# Patient Record
Sex: FEMALE | Race: WHITE | NOT HISPANIC OR LATINO | Employment: OTHER | URBAN - METROPOLITAN AREA
[De-identification: names, ages, dates, MRNs, and addresses within clinical notes are randomized per-mention and may not be internally consistent; named-entity substitution may affect disease eponyms.]

---

## 2017-12-07 PROBLEM — R53.1 GENERALIZED WEAKNESS: Status: ACTIVE | Noted: 2017-12-07

## 2017-12-08 PROBLEM — E11.9 DIABETES: Status: ACTIVE | Noted: 2017-12-08

## 2017-12-08 PROBLEM — R78.81 BACTEREMIA: Status: ACTIVE | Noted: 2017-12-08

## 2017-12-08 PROBLEM — I10 HTN (HYPERTENSION): Status: ACTIVE | Noted: 2017-12-08

## 2017-12-09 PROBLEM — N39.0 UTI (URINARY TRACT INFECTION): Status: ACTIVE | Noted: 2017-12-09

## 2017-12-10 PROBLEM — E87.6 HYPOKALEMIA: Status: ACTIVE | Noted: 2017-12-10

## 2017-12-11 PROBLEM — E87.6 HYPOKALEMIA: Status: RESOLVED | Noted: 2017-12-10 | Resolved: 2017-12-11

## 2018-01-02 PROBLEM — R52 PAIN: Status: ACTIVE | Noted: 2018-01-02

## 2018-01-02 PROBLEM — K80.00 CHOLELITHIASIS WITH ACUTE CHOLECYSTITIS: Status: ACTIVE | Noted: 2018-01-02

## 2018-01-04 ENCOUNTER — TELEPHONE (OUTPATIENT)
Dept: GASTROENTEROLOGY | Facility: CLINIC | Age: 78
End: 2018-01-04

## 2018-01-04 ENCOUNTER — TELEPHONE (OUTPATIENT)
Dept: ENDOSCOPY | Facility: HOSPITAL | Age: 78
End: 2018-01-04

## 2018-01-04 DIAGNOSIS — K86.89 PANCREATIC MASS: Primary | ICD-10-CM

## 2018-01-04 NOTE — TELEPHONE ENCOUNTER
Spoke with patient, daughter and  about instructions for UEUS scheduled 1/8/18 at 1100.  Instructions emailed.

## 2018-01-04 NOTE — TELEPHONE ENCOUNTER
Spoke with patient and daughter. EUS scheduled for 1/8 at 11a. Reviewed prep instructions. Ms Aiken verbalized understanding.

## 2018-01-06 ENCOUNTER — ANESTHESIA EVENT (OUTPATIENT)
Dept: ENDOSCOPY | Facility: HOSPITAL | Age: 78
End: 2018-01-06
Payer: MEDICARE

## 2018-01-08 ENCOUNTER — SURGERY (OUTPATIENT)
Age: 78
End: 2018-01-08

## 2018-01-08 ENCOUNTER — ANESTHESIA (OUTPATIENT)
Dept: ENDOSCOPY | Facility: HOSPITAL | Age: 78
End: 2018-01-08
Payer: MEDICARE

## 2018-01-08 PROCEDURE — D9220A PRA ANESTHESIA: Mod: CRNA,,, | Performed by: NURSE ANESTHETIST, CERTIFIED REGISTERED

## 2018-01-08 PROCEDURE — 25000003 PHARM REV CODE 250: Performed by: INTERNAL MEDICINE

## 2018-01-08 PROCEDURE — D9220A PRA ANESTHESIA: Mod: ANES,,, | Performed by: ANESTHESIOLOGY

## 2018-01-08 PROCEDURE — 63600175 PHARM REV CODE 636 W HCPCS: Performed by: NURSE ANESTHETIST, CERTIFIED REGISTERED

## 2018-01-08 RX ORDER — ONDANSETRON 2 MG/ML
INJECTION INTRAMUSCULAR; INTRAVENOUS
Status: DISCONTINUED | OUTPATIENT
Start: 2018-01-08 | End: 2018-01-08

## 2018-01-08 RX ORDER — PROPOFOL 10 MG/ML
VIAL (ML) INTRAVENOUS
Status: DISCONTINUED | OUTPATIENT
Start: 2018-01-08 | End: 2018-01-08

## 2018-01-08 RX ORDER — PROPOFOL 10 MG/ML
VIAL (ML) INTRAVENOUS CONTINUOUS PRN
Status: DISCONTINUED | OUTPATIENT
Start: 2018-01-08 | End: 2018-01-08

## 2018-01-08 RX ORDER — LIDOCAINE HCL/PF 100 MG/5ML
SYRINGE (ML) INTRAVENOUS
Status: DISCONTINUED | OUTPATIENT
Start: 2018-01-08 | End: 2018-01-08

## 2018-01-08 RX ORDER — FENTANYL CITRATE 50 UG/ML
INJECTION, SOLUTION INTRAMUSCULAR; INTRAVENOUS
Status: DISCONTINUED | OUTPATIENT
Start: 2018-01-08 | End: 2018-01-08

## 2018-01-08 RX ADMIN — FENTANYL CITRATE 25 MCG: 50 INJECTION, SOLUTION INTRAMUSCULAR; INTRAVENOUS at 11:01

## 2018-01-08 RX ADMIN — LIDOCAINE HYDROCHLORIDE 50 MG: 20 INJECTION, SOLUTION INTRAVENOUS at 11:01

## 2018-01-08 RX ADMIN — PROPOFOL 20 MG: 10 INJECTION, EMULSION INTRAVENOUS at 11:01

## 2018-01-08 RX ADMIN — PROPOFOL 30 MG: 10 INJECTION, EMULSION INTRAVENOUS at 11:01

## 2018-01-08 RX ADMIN — FENTANYL CITRATE 25 MCG: 50 INJECTION, SOLUTION INTRAMUSCULAR; INTRAVENOUS at 12:01

## 2018-01-08 RX ADMIN — SODIUM CHLORIDE: 0.9 INJECTION, SOLUTION INTRAVENOUS at 11:01

## 2018-01-08 RX ADMIN — PROPOFOL 100 MCG/KG/MIN: 10 INJECTION, EMULSION INTRAVENOUS at 11:01

## 2018-01-08 RX ADMIN — ONDANSETRON 4 MG: 2 INJECTION INTRAMUSCULAR; INTRAVENOUS at 11:01

## 2018-01-08 NOTE — ANESTHESIA PREPROCEDURE EVALUATION
01/08/2018  Rosamaria Aiken is a 77 y.o., female.  Past Medical History:   Diagnosis Date    Diabetes     HTN (hypertension)        Anesthesia Evaluation    I have reviewed the Patient Summary Reports.    I have reviewed the Nursing Notes.   I have reviewed the Medications.     Review of Systems  Anesthesia Hx:  No problems with previous Anesthesia  History of prior surgery of interest to airway management or planning: Previous anesthesia: General Denies Family Hx of Anesthesia complications.   Denies Personal Hx of Anesthesia complications.   Social:  Non-Smoker    Cardiovascular:   Hypertension Denies MI.  Denies CAD.           Pulmonary:  Pulmonary Normal    Renal/:  Renal/ Normal     Hepatic/GI:   GERD, well controlled Pancreatic mass   Neurological:  Neurology Normal    Endocrine:   Diabetes, type 2      Lab Results   Component Value Date    HGBA1C 6.9 (H) 01/02/2018       Physical Exam  General:  Well nourished    Airway/Jaw/Neck:  Airway Findings: Mouth Opening: Normal Tongue: Normal  General Airway Assessment: Adult  Mallampati: II  TM Distance: Normal, at least 6 cm  Jaw/Neck Findings:  Neck ROM: Extension Decreased, Mod.      Dental:  Dental Findings: In tact        Mental Status:  Mental Status Findings:  Cooperative, Alert and Oriented         Anesthesia Plan  Type of Anesthesia, risks & benefits discussed:  Anesthesia Type:  general  Patient's Preference:   Intra-op Monitoring Plan:   Intra-op Monitoring Plan Comments:   Post Op Pain Control Plan:   Post Op Pain Control Plan Comments:   Induction:   IV  Beta Blocker:  Patient is not currently on a Beta-Blocker (No further documentation required).       Informed Consent: Patient understands risks and agrees with Anesthesia plan.  Questions answered. Anesthesia consent signed with patient.  ASA Score: 2     Day of Surgery Review of History &  Physical:    H&P update referred to the surgeon.         Ready For Surgery From Anesthesia Perspective.

## 2018-01-08 NOTE — ANESTHESIA RELEASE NOTE
"Anesthesia Release from PACU Note    Patient: oRsamaria Aiken    Procedure(s) Performed: Procedure(s) (LRB):  ULTRASOUND-ENDOSCOPIC-UPPER (N/A)    Anesthesia type: general    Post pain: Adequate analgesia    Post assessment: no apparent anesthetic complications and tolerated procedure well    Last Vitals:   Visit Vitals  /65   Pulse 67   Temp 36.8 °C (98.2 °F) (Skin)   Resp 18   Ht 5' 3" (1.6 m)   Wt 58.1 kg (128 lb)   SpO2 98%   Breastfeeding? No   BMI 22.67 kg/m²       Post vital signs: stable    Level of consciousness: awake and alert     Nausea/Vomiting: no nausea/no vomiting    Complications: none    Airway Patency: patent    Respiratory: unassisted, spontaneous ventilation, room air    Cardiovascular: stable and blood pressure at baseline    Hydration: euvolemic  "

## 2018-01-08 NOTE — ANESTHESIA POSTPROCEDURE EVALUATION
"Anesthesia Post Evaluation    Patient: Rosamaria Aiken    Procedure(s) Performed: Procedure(s) (LRB):  ULTRASOUND-ENDOSCOPIC-UPPER (N/A)    Final Anesthesia Type: general  Patient location during evaluation: PACU  Patient participation: Yes- Able to Participate  Level of consciousness: awake and alert  Post-procedure vital signs: reviewed and stable  Pain management: adequate  Airway patency: patent  PONV status at discharge: No PONV  Anesthetic complications: no      Cardiovascular status: hemodynamically stable  Respiratory status: unassisted, spontaneous ventilation and room air  Hydration status: euvolemic  Follow-up not needed.        Visit Vitals  /65   Pulse 67   Temp 36.8 °C (98.2 °F) (Skin)   Resp 18   Ht 5' 3" (1.6 m)   Wt 58.1 kg (128 lb)   SpO2 98%   Breastfeeding? No   BMI 22.67 kg/m²       Pain/Rocco Score: Pain Assessment Performed: Yes (1/8/2018  1:44 PM)  Presence of Pain: denies (1/8/2018  1:44 PM)  Rocco Score: 10 (1/8/2018  1:00 PM)      "

## 2018-01-08 NOTE — TRANSFER OF CARE
"Anesthesia Transfer of Care Note    Patient: Rosamaria Aiken    Procedure(s) Performed: Procedure(s) (LRB):  ULTRASOUND-ENDOSCOPIC-UPPER (N/A)    Patient location: PACU    Anesthesia Type: general    Transport from OR: Transported from OR on 2-3 L/min O2 by NC with adequate spontaneous ventilation    Post pain: adequate analgesia    Post assessment: no apparent anesthetic complications and tolerated procedure well    Post vital signs: stable    Level of consciousness: awake    Nausea/Vomiting: no nausea/vomiting    Complications: none    Transfer of care protocol was followed      Last vitals:   Visit Vitals  /49   Pulse 66   Temp 36.6 °C (97.9 °F)   Resp 20   Ht 5' 3" (1.6 m)   Wt 58.1 kg (128 lb)   SpO2 100% on 2 L NC   Breastfeeding? No   BMI 22.67 kg/m²     "

## 2018-01-13 PROBLEM — K51.00 PANCOLITIS: Status: ACTIVE | Noted: 2018-01-13

## 2018-01-14 PROBLEM — D64.9 ANEMIA: Status: ACTIVE | Noted: 2018-01-14

## 2018-01-15 ENCOUNTER — PATIENT MESSAGE (OUTPATIENT)
Dept: GASTROENTEROLOGY | Facility: CLINIC | Age: 78
End: 2018-01-15

## 2018-01-16 ENCOUNTER — TELEPHONE (OUTPATIENT)
Dept: SURGERY | Facility: CLINIC | Age: 78
End: 2018-01-16

## 2018-01-16 NOTE — TELEPHONE ENCOUNTER
----- Message from Sakina Monzon MA sent at 1/15/2018  4:33 PM CST -----  Contact: Pt daughter Julieta:[phone]  Luana,   Patient wants to see Dr Arriaga.  Margarita  ----- Message -----  From: Mariposa Jacobs  Sent: 1/15/2018  12:17 PM  To: [facility]lo [facility]ONES Staff    Pt daughter called and states she would like to speak with the nurse in regards to the next step for her mother the pt. Pt daughter states she would like to go a head and make the appointment with the oncologist. Pt daughter Julieta states she would like to get the results for the pt from her biopsy.

## 2018-01-18 ENCOUNTER — TELEPHONE (OUTPATIENT)
Dept: SURGERY | Facility: CLINIC | Age: 78
End: 2018-01-18

## 2018-01-18 NOTE — TELEPHONE ENCOUNTER
----- Message from Dora Swann sent at 1/18/2018 10:22 AM CST -----  Contact: / Efren Schwartz called and wanted to reschedule a appointment with Joy schneider for 11am instead of 10am   And also had general questions. Please call Efren @ [phone] . Thanks :)

## 2018-01-22 ENCOUNTER — INITIAL CONSULT (OUTPATIENT)
Dept: SURGERY | Facility: CLINIC | Age: 78
End: 2018-01-22
Payer: MEDICARE

## 2018-01-22 ENCOUNTER — LAB VISIT (OUTPATIENT)
Dept: LAB | Facility: HOSPITAL | Age: 78
End: 2018-01-22
Attending: SURGERY
Payer: MEDICARE

## 2018-01-22 VITALS
DIASTOLIC BLOOD PRESSURE: 75 MMHG | BODY MASS INDEX: 22.7 KG/M2 | HEIGHT: 64 IN | SYSTOLIC BLOOD PRESSURE: 130 MMHG | WEIGHT: 132.94 LBS | HEART RATE: 77 BPM | TEMPERATURE: 97 F

## 2018-01-22 DIAGNOSIS — R53.1 WEAKNESS: ICD-10-CM

## 2018-01-22 DIAGNOSIS — C25.9 MALIGNANT NEOPLASM OF PANCREAS, UNSPECIFIED LOCATION OF MALIGNANCY: ICD-10-CM

## 2018-01-22 DIAGNOSIS — C25.9 MALIGNANT NEOPLASM OF PANCREAS, UNSPECIFIED LOCATION OF MALIGNANCY: Primary | ICD-10-CM

## 2018-01-22 DIAGNOSIS — E55.9 VITAMIN D DEFICIENCY: ICD-10-CM

## 2018-01-22 DIAGNOSIS — R53.1 GENERALIZED WEAKNESS: Primary | ICD-10-CM

## 2018-01-22 DIAGNOSIS — E56.9 VITAMIN DEFICIENCY: ICD-10-CM

## 2018-01-22 LAB
25(OH)D3+25(OH)D2 SERPL-MCNC: 22 NG/ML
ALBUMIN SERPL BCP-MCNC: 3 G/DL
ALP SERPL-CCNC: 57 U/L
ALT SERPL W/O P-5'-P-CCNC: 8 U/L
ANION GAP SERPL CALC-SCNC: 10 MMOL/L
AST SERPL-CCNC: 15 U/L
BASOPHILS # BLD AUTO: 0.03 K/UL
BASOPHILS NFR BLD: 0.3 %
BILIRUB SERPL-MCNC: 0.3 MG/DL
BUN SERPL-MCNC: 21 MG/DL
CALCIUM SERPL-MCNC: 9.4 MG/DL
CHLORIDE SERPL-SCNC: 109 MMOL/L
CO2 SERPL-SCNC: 23 MMOL/L
CREAT SERPL-MCNC: 1.2 MG/DL
DIFFERENTIAL METHOD: ABNORMAL
EOSINOPHIL # BLD AUTO: 0.9 K/UL
EOSINOPHIL NFR BLD: 9.4 %
ERYTHROCYTE [DISTWIDTH] IN BLOOD BY AUTOMATED COUNT: 14.1 %
EST. GFR  (AFRICAN AMERICAN): 50.4 ML/MIN/1.73 M^2
EST. GFR  (NON AFRICAN AMERICAN): 43.7 ML/MIN/1.73 M^2
GLUCOSE SERPL-MCNC: 233 MG/DL
HCT VFR BLD AUTO: 32.3 %
HGB BLD-MCNC: 10.9 G/DL
IMM GRANULOCYTES # BLD AUTO: 0.15 K/UL
IMM GRANULOCYTES NFR BLD AUTO: 1.6 %
LYMPHOCYTES # BLD AUTO: 1.7 K/UL
LYMPHOCYTES NFR BLD: 17.6 %
MCH RBC QN AUTO: 31 PG
MCHC RBC AUTO-ENTMCNC: 33.7 G/DL
MCV RBC AUTO: 92 FL
MONOCYTES # BLD AUTO: 0.7 K/UL
MONOCYTES NFR BLD: 7.8 %
NEUTROPHILS # BLD AUTO: 5.9 K/UL
NEUTROPHILS NFR BLD: 63.3 %
NRBC BLD-RTO: 0 /100 WBC
PLATELET # BLD AUTO: 368 K/UL
PMV BLD AUTO: 10 FL
POTASSIUM SERPL-SCNC: 3.3 MMOL/L
PREALB SERPL-MCNC: 21 MG/DL
PROT SERPL-MCNC: 6.5 G/DL
RBC # BLD AUTO: 3.52 M/UL
SODIUM SERPL-SCNC: 142 MMOL/L
WBC # BLD AUTO: 9.35 K/UL

## 2018-01-22 PROCEDURE — 99204 OFFICE O/P NEW MOD 45 MIN: CPT | Mod: S$GLB,,, | Performed by: SURGERY

## 2018-01-22 PROCEDURE — 99999 PR PBB SHADOW E&M-EST. PATIENT-LVL III: CPT | Mod: PBBFAC,,, | Performed by: SURGERY

## 2018-01-22 PROCEDURE — 85025 COMPLETE CBC W/AUTO DIFF WBC: CPT

## 2018-01-22 PROCEDURE — 84590 ASSAY OF VITAMIN A: CPT

## 2018-01-22 PROCEDURE — 84446 ASSAY OF VITAMIN E: CPT

## 2018-01-22 PROCEDURE — 84597 ASSAY OF VITAMIN K: CPT

## 2018-01-22 PROCEDURE — 80053 COMPREHEN METABOLIC PANEL: CPT

## 2018-01-22 PROCEDURE — 36415 COLL VENOUS BLD VENIPUNCTURE: CPT

## 2018-01-22 PROCEDURE — 82306 VITAMIN D 25 HYDROXY: CPT

## 2018-01-22 PROCEDURE — 84134 ASSAY OF PREALBUMIN: CPT

## 2018-01-22 RX ORDER — ONDANSETRON 8 MG/1
8 TABLET, ORALLY DISINTEGRATING ORAL EVERY 12 HOURS PRN
Refills: 0 | COMMUNITY
Start: 2018-01-05 | End: 2021-12-17

## 2018-01-22 RX ORDER — LEVOFLOXACIN 750 MG/1
TABLET ORAL
Refills: 0 | COMMUNITY
Start: 2017-12-21 | End: 2018-02-15

## 2018-01-22 RX ORDER — DRONABINOL 2.5 MG/1
CAPSULE ORAL
Refills: 0 | COMMUNITY
Start: 2018-01-11 | End: 2018-03-11

## 2018-01-22 RX ORDER — ONDANSETRON 4 MG/1
TABLET, ORALLY DISINTEGRATING ORAL
Refills: 0 | COMMUNITY
Start: 2017-12-05 | End: 2018-02-15

## 2018-01-22 RX ORDER — ESCITALOPRAM OXALATE 10 MG/1
10 TABLET ORAL DAILY
Refills: 5 | COMMUNITY
Start: 2017-12-02 | End: 2018-06-06

## 2018-01-22 NOTE — LETTER
January 22, 2018        Savana Luna MD  [address]  [facility] [address]             [facility]  [address]  [facility] [address]  Phone: [phone]   Patient: Rosamaria Aiken   MR Number: [id number]   YOB: 1940   Date of Visit: 1/22/2018       Dear Dr. Marlo Luna:    Thank you for referring Rosamaria Aiken to me for evaluation. Attached you will find relevant portions of my assessment and plan of care.    If you have questions, please do not hesitate to call me. I look forward to following Rosamaria Aiken along with you.    Sincerely,      Lamonte Arriaga MD            CC  No Recipients    Enclosure

## 2018-01-22 NOTE — LETTER
January 22, 2018      Savana Luna MD  [address]  [facility] [address]           [facility]  [address]  [facility] [address]  Phone: [phone]          Patient: Rosamaria Aiken   MR Number: [id number]   YOB: 1940   Date of Visit: 1/22/2018       Dear Dr. Savana Luna:    Thank you for referring Rosamaria Aiken to me for evaluation. Attached you will find relevant portions of my assessment and plan of care.    If you have questions, please do not hesitate to call me. I look forward to following Rosamaria Aiken along with you.    Sincerely,    Lamonte Arriaga MD    Enclosure  CC:  No Recipients    If you would like to receive this communication electronically, please contact [email or web address] or [phone] to request more information on [facility] access.    For providers and/or their staff who would like to refer a patient to [facility], please contact us through our one-stop-shop provider referral line, [facility], at [phone].    If you feel you have received this communication in error or would no longer like to receive these types of communications, please e-mail [email or web address]

## 2018-01-22 NOTE — PROGRESS NOTES
78 yo referred by Dr Lovett for newly diagnosed pancreatic tumor  She was admitted to the hospital in early December for urosepsis and not long after this, had the flu. Prior to this, however, she had been losing weight over the past several years and was diagnosed with diabetes.   While in the hospital in early December, an ultrasound was done to evaluate the pancreas (presumably because of the weight loss and diabetes) and this showed an abnormality which was further evaluated with CT and EUS.  The patient's functional status has greatly declined over the past two months and it is not clear whether this is because of the intercurrent UTI and flu or her underlying pancreatic process.   No pain or jaundice.   At present, she does nothing more than to get out of bed and walk for short distances in the house    Past Medical History:   Diagnosis Date    Diabetes     HTN (hypertension)     Pancreatic cancer      No history of cardiac or pulmonary disease    PSH: hysterectomy 1999    Medication List with Changes/Refills   Current Medications    AMLODIPINE (NORVASC) 10 MG TABLET    Take 10 mg by mouth once daily.    CIPROFLOXACIN HCL (CIPRO) 500 MG TABLET    Take 1 tablet (500 mg total) by mouth every 12 (twelve) hours.    DIAZEPAM (VALIUM) 5 MG TABLET    Take 5 mg by mouth 2 (two) times daily as needed.     DRONABINOL (MARINOL) 2.5 MG CAPSULE        ESCITALOPRAM OXALATE (LEXAPRO) 10 MG TABLET        LEVOFLOXACIN (LEVAQUIN) 750 MG TABLET        METRONIDAZOLE (FLAGYL) 500 MG TABLET    Take 1 tablet (500 mg total) by mouth every 12 (twelve) hours.    OMEPRAZOLE (PRILOSEC) 20 MG CAPSULE    Take 20 mg by mouth once daily.    ONDANSETRON (ZOFRAN-ODT) 4 MG TBDL        ONDANSETRON (ZOFRAN-ODT) 8 MG TBDL        OXYBUTYNIN (DITROPAN-XL) 10 MG 24 HR TABLET    Take 10 mg by mouth once daily.   Discontinued Medications    ALBUTEROL 90 MCG/ACTUATION INHALER    Inhale 2 puffs into the lungs every 6 (six) hours as needed for  Wheezing (COUGH).     Review of patient's allergies indicates:  No Known Allergies      Vitals:    01/22/18 1016   BP: 130/75   Pulse: 77   Temp: 97.4 °F (36.3 °C)     Frail elderly woman who requires moderate assistance to geto n exam table and is wearing a Depends  Anicteric sclera  Chest clear to ausc  Heart: RR&R with no m, r, g  Abd: soft, non-distended, with no mass. Well-healed lower midline incision  Ext: decreased muscle mass and tone generally with no focal deficits. No edema    EUS: 24 mm round, hypoechoic mass in body of pancreas with massive upstream PD dilation. FNAC is positive for neoplasm, with either PNET or acinic cell neoplasm being in the differential  CT 2.5 cm hypervascular mass in the body of the pancreas with massive upstream dilation of the PD. No vessel involvement    Imp:  Resectable neoplasm of the body of the pancreas, appears to be a more favorable histology and more indolent that the usual PAT  Marked debility and malnutrition-----she is not an acceptable candidate for surgery now    Rec:  Long talk with patient and her large family including her grandson, Jones, who is an internist in [place]  If she is motivated to proceed, she will need prehab prior to considering surgical resection.

## 2018-01-22 NOTE — LETTER
January 22, 2018        Savana Luna MD  [address]  [facility] [address]             [facility]  [address]  [facility] [address]  Phone: [phone]   Patient: Rosamaria Aiken   MR Number: [id number]   YOB: 1940   Date of Visit: 1/22/2018       Dear Dr. Marlo Luna:    Thank you for referring Rosamaria Aiken to me for evaluation. Attached you will find relevant portions of my assessment and plan of care.    If you have questions, please do not hesitate to call me. I look forward to following Rosamaria Aiken along with you.    Sincerely,      Lamonte Arriaga MD            CC  No Recipients    Enclosure

## 2018-01-24 ENCOUNTER — LAB VISIT (OUTPATIENT)
Dept: LAB | Facility: HOSPITAL | Age: 78
End: 2018-01-24
Payer: MEDICARE

## 2018-01-24 ENCOUNTER — OFFICE VISIT (OUTPATIENT)
Dept: SURGERY | Facility: CLINIC | Age: 78
End: 2018-01-24
Payer: MEDICARE

## 2018-01-24 VITALS
BODY MASS INDEX: 22.7 KG/M2 | DIASTOLIC BLOOD PRESSURE: 77 MMHG | WEIGHT: 132.94 LBS | HEART RATE: 79 BPM | SYSTOLIC BLOOD PRESSURE: 163 MMHG | TEMPERATURE: 97 F | HEIGHT: 64 IN

## 2018-01-24 DIAGNOSIS — R97.8 ELEVATED CA 19-9 LEVEL: ICD-10-CM

## 2018-01-24 DIAGNOSIS — R73.9 HYPERGLYCEMIA: ICD-10-CM

## 2018-01-24 DIAGNOSIS — R63.4 WEIGHT LOSS, UNINTENTIONAL: ICD-10-CM

## 2018-01-24 DIAGNOSIS — E55.9 VITAMIN D INSUFFICIENCY: ICD-10-CM

## 2018-01-24 DIAGNOSIS — K86.89 PANCREATIC MASS: Primary | ICD-10-CM

## 2018-01-24 DIAGNOSIS — Z74.09 OTHER REDUCED MOBILITY: ICD-10-CM

## 2018-01-24 DIAGNOSIS — C25.1 MALIGNANT NEOPLASM OF BODY OF PANCREAS: ICD-10-CM

## 2018-01-24 DIAGNOSIS — M79.89 INFLAMMATION OF SOFT TISSUE: ICD-10-CM

## 2018-01-24 LAB
25(OH)D3+25(OH)D2 SERPL-MCNC: 24 NG/ML
ALBUMIN SERPL BCP-MCNC: 2.9 G/DL
ALP SERPL-CCNC: 55 U/L
ALT SERPL W/O P-5'-P-CCNC: 8 U/L
ANION GAP SERPL CALC-SCNC: 7 MMOL/L
AST SERPL-CCNC: 13 U/L
BASOPHILS # BLD AUTO: 0.05 K/UL
BASOPHILS NFR BLD: 0.5 %
BILIRUB SERPL-MCNC: 0.5 MG/DL
BUN SERPL-MCNC: 26 MG/DL
CALCIUM SERPL-MCNC: 9.4 MG/DL
CANCER AG19-9 SERPL-ACNC: 41 U/ML
CHLORIDE SERPL-SCNC: 111 MMOL/L
CO2 SERPL-SCNC: 25 MMOL/L
CREAT SERPL-MCNC: 1.1 MG/DL
CRP SERPL-MCNC: 4.45 MG/L
DIFFERENTIAL METHOD: ABNORMAL
EOSINOPHIL # BLD AUTO: 1.4 K/UL
EOSINOPHIL NFR BLD: 13.1 %
ERYTHROCYTE [DISTWIDTH] IN BLOOD BY AUTOMATED COUNT: 14.3 %
EST. GFR  (AFRICAN AMERICAN): 56 ML/MIN/1.73 M^2
EST. GFR  (NON AFRICAN AMERICAN): 48.5 ML/MIN/1.73 M^2
ESTIMATED AVG GLUCOSE: 143 MG/DL
GLUCOSE SERPL-MCNC: 174 MG/DL
HBA1C MFR BLD HPLC: 6.6 %
HCT VFR BLD AUTO: 34 %
HGB BLD-MCNC: 11.1 G/DL
IMM GRANULOCYTES # BLD AUTO: 0.09 K/UL
IMM GRANULOCYTES NFR BLD AUTO: 0.9 %
LYMPHOCYTES # BLD AUTO: 1.9 K/UL
LYMPHOCYTES NFR BLD: 18.3 %
MCH RBC QN AUTO: 30.9 PG
MCHC RBC AUTO-ENTMCNC: 32.6 G/DL
MCV RBC AUTO: 95 FL
MONOCYTES # BLD AUTO: 0.8 K/UL
MONOCYTES NFR BLD: 7.1 %
NEUTROPHILS # BLD AUTO: 6.3 K/UL
NEUTROPHILS NFR BLD: 60.1 %
NRBC BLD-RTO: 0 /100 WBC
PLATELET # BLD AUTO: 368 K/UL
PMV BLD AUTO: 9.7 FL
POTASSIUM SERPL-SCNC: 3.3 MMOL/L
PREALB SERPL-MCNC: 23 MG/DL
PROT SERPL-MCNC: 6.5 G/DL
RBC # BLD AUTO: 3.59 M/UL
SODIUM SERPL-SCNC: 143 MMOL/L
WBC # BLD AUTO: 10.52 K/UL

## 2018-01-24 PROCEDURE — 84134 ASSAY OF PREALBUMIN: CPT

## 2018-01-24 PROCEDURE — 80053 COMPREHEN METABOLIC PANEL: CPT

## 2018-01-24 PROCEDURE — 86141 C-REACTIVE PROTEIN HS: CPT

## 2018-01-24 PROCEDURE — 85025 COMPLETE CBC W/AUTO DIFF WBC: CPT

## 2018-01-24 PROCEDURE — 84590 ASSAY OF VITAMIN A: CPT

## 2018-01-24 PROCEDURE — 99214 OFFICE O/P EST MOD 30 MIN: CPT | Mod: S$GLB,,, | Performed by: NURSE PRACTITIONER

## 2018-01-24 PROCEDURE — 83036 HEMOGLOBIN GLYCOSYLATED A1C: CPT

## 2018-01-24 PROCEDURE — 36415 COLL VENOUS BLD VENIPUNCTURE: CPT

## 2018-01-24 PROCEDURE — 86301 IMMUNOASSAY TUMOR CA 19-9: CPT

## 2018-01-24 PROCEDURE — 84446 ASSAY OF VITAMIN E: CPT

## 2018-01-24 PROCEDURE — 82306 VITAMIN D 25 HYDROXY: CPT

## 2018-01-24 PROCEDURE — 99999 PR PBB SHADOW E&M-EST. PATIENT-LVL III: CPT | Mod: PBBFAC,,, | Performed by: NURSE PRACTITIONER

## 2018-01-24 PROCEDURE — 84597 ASSAY OF VITAMIN K: CPT

## 2018-01-25 ENCOUNTER — TELEPHONE (OUTPATIENT)
Dept: SURGERY | Facility: CLINIC | Age: 78
End: 2018-01-25

## 2018-01-25 NOTE — TELEPHONE ENCOUNTER
----- Message from Capri Mandel sent at 1/25/2018 10:49 AM CST -----  Contact: Pt.'s     Caller states he Would like to speak to nurse  reference to Scheduling Appt. For his wife  ASAP Please call Pt. back @ [phone] Thanks :)

## 2018-01-27 LAB — PHYTONADIONE SERPL-MCNC: 1591 PG/ML (ref 80–1160)

## 2018-01-28 LAB
A-TOCOPHEROL VIT E SERPL-MCNC: 1030 UG/DL (ref 500–1800)
VIT A SERPL-MCNC: 47 UG/DL (ref 38–106)

## 2018-01-29 PROBLEM — E55.9 VITAMIN D INSUFFICIENCY: Status: ACTIVE | Noted: 2018-01-29

## 2018-01-29 PROBLEM — R63.4 WEIGHT LOSS, UNINTENTIONAL: Status: ACTIVE | Noted: 2018-01-29

## 2018-01-29 PROBLEM — Z74.09 OTHER REDUCED MOBILITY: Status: ACTIVE | Noted: 2018-01-29

## 2018-01-29 PROBLEM — C25.1 MALIGNANT NEOPLASM OF BODY OF PANCREAS: Status: ACTIVE | Noted: 2018-01-29

## 2018-01-29 PROBLEM — M79.89 INFLAMMATION OF SOFT TISSUE: Status: ACTIVE | Noted: 2018-01-29

## 2018-01-29 PROBLEM — R97.8 ELEVATED CA 19-9 LEVEL: Status: ACTIVE | Noted: 2018-01-29

## 2018-01-29 PROBLEM — R73.9 HYPERGLYCEMIA: Status: ACTIVE | Noted: 2018-01-29

## 2018-01-29 LAB
A-TOCOPHEROL VIT E SERPL-MCNC: 1036 UG/DL (ref 500–1800)
VIT A SERPL-MCNC: 48 UG/DL (ref 38–106)

## 2018-01-29 NOTE — PROGRESS NOTES
"History & Physical    SUBJECTIVE:     History of Present Illness:  Ms Aiken is a pleasant 78 yo who is accompanied by her , Efren, daughter, Jackie, and granddaughter, Jazmine. She has a very large family which includes her grandson, Jones, who is an internist in [place]. She is only accompanied by three family members. She is coming today as she has been referred to me by Dr. Arriaga for discussion of prehab and explanation of the goals/rationale of improving her physical function prior to resection. She had initially been referred to our service by Dr Lovett for newly diagnosed pancreatic tumor.      She says she had been losing weight unintentionally for about 2 years and had lost 58lbs total, eventually being diagnosed w diabetes. She says she has "lost her tastebuds" and has been in decline for a while. She was admitted to the hospital in early December for urosepsis and not long after this, had the flu.While in the hospital in early December, an ultrasound was done to evaluate the pancreas (presumably because of the weight loss and diabetes) and this showed an abnormality which was further evaluated with CT and EUS. She reports that she had been vomiting at night and often has nausea. She burps and farts quite a bit more than ever and has had episodes of constipation. She is very sedentary now- lays in bed, gets up from time to time, uses a walker for walking any longer distances. Prior to this episiode, she had been quite healthy and never really had any medical problems.     1/13/18 CT ABD: Wall thickening of the colon extending from the mid to distal transverse colon through the descending colon and involving the rectum. Findings are consistent with colitis/proctitis. Infectious or inflammatory etiology felt to be most likely. Stable appearance of soft tissue density mass at the pancreatic body with obstruction of the distal pancreatic duct which is markedly dilated. No normal residual " pancreatic parenchyma is present distal to the mass as previously discussed.    01/06/18 EUS w FNA: A round, hypoechoic 24 mm by 20 mm mass with well defined borders was identified in the pancreatic body.The endosonographic appearance of parenchyma and the upstream pancreatic duct indicated a maximum duct diameter of 20 mm. There was no sign of significant endosonographic abnormality  involving the celiac trunk. No lymphadenopathy seen.    01/06/18 PATHO: positive for neoplasia, differential includes neuroendocrine tumor or acinic cell neoplasm.      FUNCTIONAL STATUS:    Cardiac mets <4, able to get out of bed, uses walker.    :    19.6 (LH)  18.9 (RH)        Chief Complaint   Patient presents with    Follow-up     Prehab       Review of patient's allergies indicates:  No Known Allergies    Current Outpatient Prescriptions   Medication Sig Dispense Refill    amLODIPine (NORVASC) 10 MG tablet Take 10 mg by mouth once daily.      diazePAM (VALIUM) 5 MG tablet Take 5 mg by mouth 2 (two) times daily as needed.       dronabinol (MARINOL) 2.5 MG capsule   0    escitalopram oxalate (LEXAPRO) 10 MG tablet   5    levoFLOXacin (LEVAQUIN) 750 MG tablet   0    lipase-protease-amylase 24,000-76,000-120,000 units (CREON) 24,000-76,000 -120,000 unit capsule Take 2 capsules by mouth 3 (three) times daily with meals. Take 1 capsule w snacks 240 capsule 1    omeprazole (PRILOSEC) 20 MG capsule Take 20 mg by mouth once daily.      ondansetron (ZOFRAN-ODT) 4 MG TbDL   0    ondansetron (ZOFRAN-ODT) 8 MG TbDL   0    oxybutynin (DITROPAN-XL) 10 MG 24 hr tablet Take 10 mg by mouth once daily.       No current facility-administered medications for this visit.        Past Medical History:   Diagnosis Date    Diabetes     HTN (hypertension)     Pancreatic cancer      Past Surgical History:   Procedure Laterality Date    BLADDER SUSPENSION      HYSTERECTOMY       No family history on file.  Social History   Substance Use  "Topics    Smoking status: Never Smoker    Smokeless tobacco: Never Used    Alcohol use No        Review of Systems:  Review of Systems   Constitutional: Positive for activity change, appetite change, fatigue and unexpected weight change.   HENT: Negative.    Respiratory: Negative.    Cardiovascular: Negative.    Gastrointestinal: Positive for constipation, nausea and vomiting.        Gassy, flatulent, has had episodes of vomiting, anorexia, changes in taste   Genitourinary: Negative.    Musculoskeletal: Negative.    Skin: Positive for color change.   Neurological: Negative.    Psychiatric/Behavioral: Negative.        OBJECTIVE:     Vital Signs (Most Recent)  Temp: 97.4 °F (36.3 °C) (01/24/18 1200)  Pulse: 79 (01/24/18 1200)  BP: (!) 163/77 (01/24/18 1200)  5' 3.5" (1.613 m)  60.3 kg (132 lb 15 oz)     Physical Exam:  Physical Exam   Constitutional: She is oriented to person, place, and time. She is cooperative. She does not appear ill. No distress.   Mild pallor   HENT:   Head: Normocephalic and atraumatic.   Mouth/Throat: Uvula is midline and oropharynx is clear and moist.   Eyes: Conjunctivae and lids are normal. No scleral icterus.   Neck: Trachea normal, normal range of motion, full passive range of motion without pain and phonation normal. Neck supple. No thyroid mass and no thyromegaly present.   Cardiovascular: Normal rate, regular rhythm, S1 normal, S2 normal and normal heart sounds.    Pulses:       Radial pulses are 2+ on the right side, and 2+ on the left side.   no edema   Pulmonary/Chest: Effort normal. She has decreased breath sounds in the right lower field and the left lower field.   Abdominal: Soft. Bowel sounds are normal. There is no tenderness. No hernia.   Musculoskeletal:   Ambulates using walker, states has been "off balance" due to weakness   Lymphadenopathy:     She has no cervical adenopathy.        Right: No supraclavicular adenopathy present.        Left: No supraclavicular adenopathy " present.   Neurological: She is alert and oriented to person, place, and time. She has normal strength. Coordination and gait normal.   Skin: Skin is warm, dry and intact. Capillary refill takes less than 2 seconds.   Psychiatric: She has a normal mood and affect. Her speech is normal and behavior is normal. Judgment and thought content normal. Cognition and memory are normal.       Laboratory  ordered    Diagnostic Results:  reviewed    ASSESSMENT/PLAN:     IMPR: resectable pancreatic mass, physical deconditioning    PLAN:    1. Labs today, start prehab  2. RTC in 2 weeks for follow up

## 2018-01-31 LAB — PHYTONADIONE SERPL-MCNC: 499 PG/ML (ref 80–1160)

## 2018-02-07 ENCOUNTER — LAB VISIT (OUTPATIENT)
Dept: LAB | Facility: HOSPITAL | Age: 78
End: 2018-02-07
Payer: MEDICARE

## 2018-02-07 ENCOUNTER — OFFICE VISIT (OUTPATIENT)
Dept: SURGERY | Facility: CLINIC | Age: 78
End: 2018-02-07
Payer: MEDICARE

## 2018-02-07 VITALS
HEART RATE: 79 BPM | SYSTOLIC BLOOD PRESSURE: 139 MMHG | BODY MASS INDEX: 21.83 KG/M2 | DIASTOLIC BLOOD PRESSURE: 79 MMHG | HEIGHT: 64 IN | WEIGHT: 127.88 LBS | TEMPERATURE: 97 F

## 2018-02-07 DIAGNOSIS — Z74.09 OTHER REDUCED MOBILITY: ICD-10-CM

## 2018-02-07 DIAGNOSIS — C25.9 PANCREATIC CARCINOMA: ICD-10-CM

## 2018-02-07 DIAGNOSIS — E86.0 DEHYDRATION: ICD-10-CM

## 2018-02-07 DIAGNOSIS — E55.9 VITAMIN D DEFICIENCY: Primary | ICD-10-CM

## 2018-02-07 DIAGNOSIS — E55.9 VITAMIN D DEFICIENCY: ICD-10-CM

## 2018-02-07 DIAGNOSIS — R53.1 GENERALIZED WEAKNESS: ICD-10-CM

## 2018-02-07 DIAGNOSIS — K86.89 PANCREATIC MASS: Primary | ICD-10-CM

## 2018-02-07 LAB
25(OH)D3+25(OH)D2 SERPL-MCNC: 29 NG/ML
ALBUMIN SERPL BCP-MCNC: 3.2 G/DL
ALP SERPL-CCNC: 54 U/L
ALT SERPL W/O P-5'-P-CCNC: 11 U/L
ANION GAP SERPL CALC-SCNC: 8 MMOL/L
AST SERPL-CCNC: 15 U/L
BASOPHILS # BLD AUTO: 0.07 K/UL
BASOPHILS NFR BLD: 0.7 %
BILIRUB SERPL-MCNC: 0.5 MG/DL
BUN SERPL-MCNC: 36 MG/DL
CALCIUM SERPL-MCNC: 10 MG/DL
CANCER AG19-9 SERPL-ACNC: 26 U/ML
CHLORIDE SERPL-SCNC: 106 MMOL/L
CO2 SERPL-SCNC: 26 MMOL/L
CREAT SERPL-MCNC: 1.2 MG/DL
CRP SERPL-MCNC: 2.08 MG/L
DIFFERENTIAL METHOD: ABNORMAL
EOSINOPHIL # BLD AUTO: 0.5 K/UL
EOSINOPHIL NFR BLD: 5.3 %
ERYTHROCYTE [DISTWIDTH] IN BLOOD BY AUTOMATED COUNT: 14.5 %
EST. GFR  (AFRICAN AMERICAN): 50.4 ML/MIN/1.73 M^2
EST. GFR  (NON AFRICAN AMERICAN): 43.7 ML/MIN/1.73 M^2
GLUCOSE SERPL-MCNC: 198 MG/DL
HCT VFR BLD AUTO: 35.5 %
HGB BLD-MCNC: 11.5 G/DL
IMM GRANULOCYTES # BLD AUTO: 0.08 K/UL
IMM GRANULOCYTES NFR BLD AUTO: 0.8 %
LYMPHOCYTES # BLD AUTO: 1.4 K/UL
LYMPHOCYTES NFR BLD: 14.4 %
MCH RBC QN AUTO: 30.5 PG
MCHC RBC AUTO-ENTMCNC: 32.4 G/DL
MCV RBC AUTO: 94 FL
MONOCYTES # BLD AUTO: 0.7 K/UL
MONOCYTES NFR BLD: 6.5 %
NEUTROPHILS # BLD AUTO: 7.2 K/UL
NEUTROPHILS NFR BLD: 72.3 %
NRBC BLD-RTO: 0 /100 WBC
PLATELET # BLD AUTO: 391 K/UL
PMV BLD AUTO: 10.2 FL
POTASSIUM SERPL-SCNC: 4.3 MMOL/L
PROT SERPL-MCNC: 7 G/DL
RBC # BLD AUTO: 3.77 M/UL
SODIUM SERPL-SCNC: 140 MMOL/L
WBC # BLD AUTO: 10.01 K/UL

## 2018-02-07 PROCEDURE — 85025 COMPLETE CBC W/AUTO DIFF WBC: CPT

## 2018-02-07 PROCEDURE — 3008F BODY MASS INDEX DOCD: CPT | Mod: S$GLB,,, | Performed by: NURSE PRACTITIONER

## 2018-02-07 PROCEDURE — 99214 OFFICE O/P EST MOD 30 MIN: CPT | Mod: S$GLB,,, | Performed by: NURSE PRACTITIONER

## 2018-02-07 PROCEDURE — 1126F AMNT PAIN NOTED NONE PRSNT: CPT | Mod: S$GLB,,, | Performed by: NURSE PRACTITIONER

## 2018-02-07 PROCEDURE — 86141 C-REACTIVE PROTEIN HS: CPT

## 2018-02-07 PROCEDURE — 1159F MED LIST DOCD IN RCRD: CPT | Mod: S$GLB,,, | Performed by: NURSE PRACTITIONER

## 2018-02-07 PROCEDURE — 36415 COLL VENOUS BLD VENIPUNCTURE: CPT

## 2018-02-07 PROCEDURE — 99999 PR PBB SHADOW E&M-EST. PATIENT-LVL III: CPT | Mod: PBBFAC,,, | Performed by: NURSE PRACTITIONER

## 2018-02-07 PROCEDURE — 80053 COMPREHEN METABOLIC PANEL: CPT

## 2018-02-07 PROCEDURE — 82306 VITAMIN D 25 HYDROXY: CPT

## 2018-02-07 PROCEDURE — 86301 IMMUNOASSAY TUMOR CA 19-9: CPT

## 2018-02-08 NOTE — PROGRESS NOTES
"Ms Aiken is a pleasant 78 yo who is accompanied by her , Efren, daughter, Jackie, and granddaughter, Jazmine. We put her on the prehab regimen and she says she is following it, has reduced GI symptoms but still has fatigue, some nausea. She went back on her anti-depressants but is not feeling a mood lift yet.  She is eating regular food, although small amounts but is not taking in enough liquid daily. She states only drinks about 2 bottles of water/day. This may explain why she had a UTI a few months ago and has fatigue, nausea.  She had initially been referred to our service by Dr Lovett for newly diagnosed pancreatic tumor.       Her oncologic hx is as follows:    She says she had been losing weight unintentionally for about 2 years and had lost 58lbs total, eventually being diagnosed w diabetes. She says she has "lost her tastebuds" and has been in decline for a while. She was admitted to the hospital in early December for urosepsis and not long after this, had the flu. While in the hospital in early December, an ultrasound was done to evaluate the pancreas (presumably because of the weight loss and diabetes) and this showed an abnormality which was further evaluated with CT and EUS. She reports that she had been vomiting at night and often has nausea. She burps and farts quite a bit more than ever and has had episodes of constipation. She is very sedentary now- lays in bed, gets up from time to time, uses a walker for walking any longer distances. Prior to this episiode, she had been quite healthy and never really had any medical problems.      1/13/18 CT ABD: Wall thickening of the colon extending from the mid to distal transverse colon through the descending colon and involving the rectum. Findings are consistent with colitis/proctitis. Infectious or inflammatory etiology felt to be most likely. Stable appearance of soft tissue density mass at the pancreatic body with obstruction of the distal " pancreatic duct which is markedly dilated. No normal residual pancreatic parenchyma is present distal to the mass as previously discussed.     01/06/18 EUS w FNA: A round, hypoechoic 24 mm by 20 mm mass with well defined borders was identified in the pancreatic body.The endosonographic appearance of parenchyma and the upstream pancreatic duct indicated a maximum duct diameter of 20 mm. There was no sign of significant endosonographic abnormality  involving the celiac trunk. No lymphadenopathy seen.     01/06/18 PATHO: positive for neoplasia, differential includes neuroendocrine tumor or acinic cell neoplasm.        FUNCTIONAL STATUS:     Cardiac mets <4, able to get out of bed, uses walker.      (weaker today than last visit)     02/07/18  15.1 (LH)  14.9 (RH)     01/24/18  19.6 (LH)  18.9 (RH)    Review of Systems:  Review of Systems   Constitutional: More active, taking PT but gets tired, looking better, eating better  HENT: Negative.    Respiratory: Negative.    Cardiovascular: Negative.    Gastrointestinal: Improvement in GI function, taking PERT, symptoms mostly gone, except some nausea  Genitourinary: Negative.    Musculoskeletal: Negative.    Skin: Overall skin color improved, still appears dry.   Neurological: Negative.    Psychiatric/Behavioral: Negative    Physical Exam:  Physical Exam   Constitutional: She is oriented to person, place, and time. She is cooperative. She does not appear ill. No distress.   Color improved  HENT:   Head: Normocephalic and atraumatic.   Mouth/Throat: Uvula is midline and oropharynx is clear and moist.   Eyes: Conjunctivae and lids are normal. No scleral icterus.   Neck: Trachea normal, normal range of motion, full passive range of motion without pain and phonation normal. Neck supple. No thyroid mass and no thyromegaly present.   Cardiovascular: Normal rate, regular rhythm, S1 normal, S2 normal and normal heart sounds.    Pulses:       Radial pulses are 2+ on the right  "side, and 2+ on the left side.   no edema   Pulmonary/Chest: Effort normal. She has decreased breath sounds in the right lower field and the left lower field.   Abdominal: Soft. Bowel sounds are normal. There is no tenderness. No hernia.   Musculoskeletal:   Ambulates using walker, states has been "off balance" due to weakness   Lymphadenopathy:     She has no cervical adenopathy.        Right: No supraclavicular adenopathy present.        Left: No supraclavicular adenopathy present.   Neurological: She is alert and oriented to person, place, and time. She has normal strength. Coordination and gait normal.   Skin: Skin is warm, dry and intact. Capillary refill takes less than 2 seconds.   Psychiatric: She has a flat mood and affect. Her speech is normal and behavior is normal. Judgment and thought content normal. Cognition and memory are normal.     We discussed that although she looks better, seems to be eating better and is following the Prehab protocol, she is a little weaker today than our clinical visit a couple of weeks ago. She said PT came 2 days ago, she was worn out and then didn't eat yesterday. She doesn't seem to want to drink enough fluids and I suspect that her labs will reveal dehydration. She also has a long history of depression and was re-started on her antidepressant. It seems she is depressed and not pushing herself to get more hydrated and better. She is amenable to having some IV fluids weekly and I will call her PCP to see if he can arrange for her in [place].       ASSESSMENT: physical frailty, pancreatic mass, depression    PLAN:    1. Labs, then call with lab results  2. If dehydrated, weekly IVF in her home town, push fluids orally at home  3. Recheck physical function when hydrated properly      ADDENDUM: Pt contacted- message left regarding being dehydrated, called and spoke w PCp's office and asked for weekly normal saline IV bolus- pt not drinking enough.     "

## 2018-02-09 ENCOUNTER — TELEPHONE (OUTPATIENT)
Dept: SURGERY | Facility: CLINIC | Age: 78
End: 2018-02-09

## 2018-02-12 ENCOUNTER — TELEPHONE (OUTPATIENT)
Dept: SURGERY | Facility: CLINIC | Age: 78
End: 2018-02-12

## 2018-02-14 PROBLEM — E86.0 DEHYDRATION: Status: ACTIVE | Noted: 2018-02-14

## 2018-02-19 DIAGNOSIS — K86.89 PANCREATIC MASS: Primary | ICD-10-CM

## 2018-02-28 ENCOUNTER — CLINICAL SUPPORT (OUTPATIENT)
Dept: INFECTIOUS DISEASES | Facility: CLINIC | Age: 78
End: 2018-02-28
Payer: MEDICARE

## 2018-02-28 ENCOUNTER — LAB VISIT (OUTPATIENT)
Dept: LAB | Facility: HOSPITAL | Age: 78
End: 2018-02-28
Attending: NURSE PRACTITIONER
Payer: MEDICARE

## 2018-02-28 ENCOUNTER — OFFICE VISIT (OUTPATIENT)
Dept: SURGERY | Facility: CLINIC | Age: 78
End: 2018-02-28
Payer: MEDICARE

## 2018-02-28 VITALS
WEIGHT: 128.31 LBS | BODY MASS INDEX: 20.62 KG/M2 | HEART RATE: 66 BPM | DIASTOLIC BLOOD PRESSURE: 73 MMHG | TEMPERATURE: 99 F | SYSTOLIC BLOOD PRESSURE: 172 MMHG | HEIGHT: 66 IN

## 2018-02-28 DIAGNOSIS — C25.0 MALIGNANT NEOPLASM OF HEAD OF PANCREAS: ICD-10-CM

## 2018-02-28 DIAGNOSIS — R63.4 WEIGHT LOSS: Primary | ICD-10-CM

## 2018-02-28 DIAGNOSIS — Z74.09 OTHER REDUCED MOBILITY: ICD-10-CM

## 2018-02-28 DIAGNOSIS — R63.4 WEIGHT LOSS: ICD-10-CM

## 2018-02-28 DIAGNOSIS — R63.4 UNINTENTIONAL WEIGHT LOSS: ICD-10-CM

## 2018-02-28 DIAGNOSIS — E55.9 VITAMIN D INSUFFICIENCY: ICD-10-CM

## 2018-02-28 DIAGNOSIS — M79.89 INFLAMMATION OF SOFT TISSUE: ICD-10-CM

## 2018-02-28 DIAGNOSIS — K86.89 PANCREATIC MASS: ICD-10-CM

## 2018-02-28 DIAGNOSIS — C25.1 MALIGNANT NEOPLASM OF BODY OF PANCREAS: Primary | ICD-10-CM

## 2018-02-28 LAB
ALBUMIN SERPL BCP-MCNC: 3.4 G/DL
ALP SERPL-CCNC: 53 U/L
ALT SERPL W/O P-5'-P-CCNC: 10 U/L
ANION GAP SERPL CALC-SCNC: 7 MMOL/L
AST SERPL-CCNC: 10 U/L
BASOPHILS # BLD AUTO: 0.05 K/UL
BASOPHILS NFR BLD: 0.5 %
BILIRUB SERPL-MCNC: 0.7 MG/DL
BUN SERPL-MCNC: 34 MG/DL
CALCIUM SERPL-MCNC: 10.1 MG/DL
CANCER AG19-9 SERPL-ACNC: 27 U/ML
CHLORIDE SERPL-SCNC: 107 MMOL/L
CO2 SERPL-SCNC: 26 MMOL/L
CREAT SERPL-MCNC: 1.1 MG/DL
CRP SERPL-MCNC: 1.52 MG/L
DIFFERENTIAL METHOD: ABNORMAL
EOSINOPHIL # BLD AUTO: 0.5 K/UL
EOSINOPHIL NFR BLD: 5.1 %
ERYTHROCYTE [DISTWIDTH] IN BLOOD BY AUTOMATED COUNT: 13.6 %
EST. GFR  (AFRICAN AMERICAN): 56 ML/MIN/1.73 M^2
EST. GFR  (NON AFRICAN AMERICAN): 48.5 ML/MIN/1.73 M^2
GLUCOSE SERPL-MCNC: 190 MG/DL
HCT VFR BLD AUTO: 36 %
HGB BLD-MCNC: 12.1 G/DL
IMM GRANULOCYTES # BLD AUTO: 0.07 K/UL
IMM GRANULOCYTES NFR BLD AUTO: 0.8 %
LYMPHOCYTES # BLD AUTO: 2.3 K/UL
LYMPHOCYTES NFR BLD: 24.6 %
MCH RBC QN AUTO: 31.7 PG
MCHC RBC AUTO-ENTMCNC: 33.6 G/DL
MCV RBC AUTO: 94 FL
MONOCYTES # BLD AUTO: 0.7 K/UL
MONOCYTES NFR BLD: 7.4 %
NEUTROPHILS # BLD AUTO: 5.7 K/UL
NEUTROPHILS NFR BLD: 61.6 %
NRBC BLD-RTO: 0 /100 WBC
PLATELET # BLD AUTO: 296 K/UL
PMV BLD AUTO: 9.8 FL
POTASSIUM SERPL-SCNC: 3.9 MMOL/L
PREALB SERPL-MCNC: 30 MG/DL
PROT SERPL-MCNC: 6.9 G/DL
RBC # BLD AUTO: 3.82 M/UL
SODIUM SERPL-SCNC: 140 MMOL/L
WBC # BLD AUTO: 9.28 K/UL

## 2018-02-28 PROCEDURE — 90471 IMMUNIZATION ADMIN: CPT | Mod: S$GLB,,, | Performed by: INTERNAL MEDICINE

## 2018-02-28 PROCEDURE — 86301 IMMUNOASSAY TUMOR CA 19-9: CPT

## 2018-02-28 PROCEDURE — 90734 MENACWYD/MENACWYCRM VACC IM: CPT | Mod: S$GLB,,, | Performed by: INTERNAL MEDICINE

## 2018-02-28 PROCEDURE — 84134 ASSAY OF PREALBUMIN: CPT

## 2018-02-28 PROCEDURE — 99999 PR PBB SHADOW E&M-EST. PATIENT-LVL III: CPT | Mod: PBBFAC,,, | Performed by: NURSE PRACTITIONER

## 2018-02-28 PROCEDURE — 80053 COMPREHEN METABOLIC PANEL: CPT

## 2018-02-28 PROCEDURE — 90670 PCV13 VACCINE IM: CPT | Mod: S$GLB,,, | Performed by: INTERNAL MEDICINE

## 2018-02-28 PROCEDURE — G0009 ADMIN PNEUMOCOCCAL VACCINE: HCPCS | Mod: 59,S$GLB,, | Performed by: INTERNAL MEDICINE

## 2018-02-28 PROCEDURE — 86141 C-REACTIVE PROTEIN HS: CPT

## 2018-02-28 PROCEDURE — 85025 COMPLETE CBC W/AUTO DIFF WBC: CPT

## 2018-02-28 PROCEDURE — 99214 OFFICE O/P EST MOD 30 MIN: CPT | Mod: S$GLB,,, | Performed by: NURSE PRACTITIONER

## 2018-02-28 PROCEDURE — 90472 IMMUNIZATION ADMIN EACH ADD: CPT | Mod: S$GLB,,, | Performed by: INTERNAL MEDICINE

## 2018-02-28 PROCEDURE — 36415 COLL VENOUS BLD VENIPUNCTURE: CPT

## 2018-02-28 PROCEDURE — 90648 HIB PRP-T VACCINE 4 DOSE IM: CPT | Mod: S$GLB,,, | Performed by: INTERNAL MEDICINE

## 2018-02-28 NOTE — PROGRESS NOTES
"Ms Aiken is a pleasant 76 yo who is accompanied by her , Efren, and daughters. We put her on the prehab regimen and she says she is following it, is taking the protein, creon, Vit D and Omegas- finally is feeling much better! She is taking in more fluid, is now off the walker and walking unaided, is doing regular PT and is smiling and happy. She has had an infusion of IV fluids to catch her up and says that she is feeling better than she has in a very long time.      Her oncologic hx is as follows:     She says she had been losing weight unintentionally for about 2 years and had lost 58lbs total, eventually being diagnosed w diabetes. She says she has "lost her tastebuds" and has been in decline for a while. She was admitted to the hospital in early December for urosepsis and not long after this, had the flu. While in the hospital in early December, an ultrasound was done to evaluate the pancreas (presumably because of the weight loss and diabetes) and this showed an abnormality which was further evaluated with CT and EUS. She reports that she had been vomiting at night and often has nausea. She burps and farts quite a bit more than ever and has had episodes of constipation. She is very sedentary now- lays in bed, gets up from time to time, uses a walker for walking any longer distances. Prior to this episiode, she had been quite healthy and never really had any medical problems.      1/13/18 CT ABD: Wall thickening of the colon extending from the mid to distal transverse colon through the descending colon and involving the rectum. Findings are consistent with colitis/proctitis. Infectious or inflammatory etiology felt to be most likely. Stable appearance of soft tissue density mass at the pancreatic body with obstruction of the distal pancreatic duct which is markedly dilated. No normal residual pancreatic parenchyma is present distal to the mass as previously discussed.     01/06/18 EUS w FNA: A round, " hypoechoic 24 mm by 20 mm mass with well defined borders was identified in the pancreatic body.The endosonographic appearance of parenchyma and the upstream pancreatic duct indicated a maximum duct diameter of 20 mm. There was no sign of significant endosonographic abnormality  involving the celiac trunk. No lymphadenopathy seen.     01/06/18 PATHO: positive for neoplasia, differential includes neuroendocrine tumor or acinic cell neoplasm.    FUNCTIONAL STATUS:    CARDIAC METS= 4     (considerably improved and WNL for her age)    19.0 (LH)  25.8 (RH)      (not using walker- able to walk wo aid)      We discussed that she has made good progress and is now in better condition functionally to withstand the rigors of surgery. She is pleased as well and Dr. Arriaga had an opportunity to visit w her and note her progress. We will obtain a DSE and schedule resection, pending clearance.        ASSESSMENT: physical function improving, no indication of metastatic progression at this time.    PLAN:    1. DSE, proceed to resection if all well.

## 2018-02-28 NOTE — PROGRESS NOTES
Pt received her immunizations (Prevar 13, HIB, and Menactra Meningococcal). Pt did not want to make a return appt at this time. She would like to try to get the Pneumovax 23 vaccination at a facility closer to home (pt lives in [place]). Department phone number provided in case pt is not able to make that appt. Pt educated that the Pneumovax 23 vaccination is due in 2 months. Pt left the unit in NAD.

## 2018-02-28 NOTE — Clinical Note
Mrs. Aiken's DSE and recent NM cardiac stress indicated myocardial ischemia, in the anteroseptal region of the LV, with underlying injury and dysmotility.   Dr. MARCIAL, she will need to see cardiology and should we plan to get them to weigh in and possibly delay resection which is planned for Tuesday?  Lorene

## 2018-03-01 ENCOUNTER — TELEPHONE (OUTPATIENT)
Dept: SURGERY | Facility: CLINIC | Age: 78
End: 2018-03-01

## 2018-03-01 DIAGNOSIS — R94.31 ABNORMAL EKG: Primary | ICD-10-CM

## 2018-03-05 ENCOUNTER — TELEPHONE (OUTPATIENT)
Dept: SURGERY | Facility: CLINIC | Age: 78
End: 2018-03-05

## 2018-03-05 DIAGNOSIS — K86.89 PANCREATIC MASS: Primary | ICD-10-CM

## 2018-03-05 NOTE — TELEPHONE ENCOUNTER
----- Message from Dora Swann sent at 3/5/2018  1:40 PM CST -----  Contact: mei/ radiology  Mei States that she needs a call returned by a nurse in reference to Rosamaria, Rosamaria is there with them now ,   Please call Mei @ [phone]  . Thanks :)

## 2018-03-07 ENCOUNTER — HOSPITAL ENCOUNTER (OUTPATIENT)
Dept: CARDIOLOGY | Facility: CLINIC | Age: 78
Discharge: HOME | End: 2018-03-07
Attending: NURSE PRACTITIONER
Payer: MEDICARE

## 2018-03-07 DIAGNOSIS — R94.31 NONSPECIFIC ABNORMAL ELECTROCARDIOGRAM (ECG) (EKG): Primary | ICD-10-CM

## 2018-03-07 DIAGNOSIS — I44.7 BLOCK, BUNDLE BRANCH, LEFT: ICD-10-CM

## 2018-03-07 DIAGNOSIS — I44.7 BLOCK, BUNDLE BRANCH, LEFT: Primary | ICD-10-CM

## 2018-03-07 DIAGNOSIS — I44.7 COMPLETE LEFT BUNDLE BRANCH BLOCK: Primary | ICD-10-CM

## 2018-03-07 DIAGNOSIS — I44.7 LEFT BUNDLE BRANCH BLOCK: ICD-10-CM

## 2018-03-07 DIAGNOSIS — R94.31 ABNORMAL EKG: ICD-10-CM

## 2018-03-07 LAB
AORTIC VALVE REGURGITATION: ABNORMAL
DIASTOLIC DYSFUNCTION: YES
MITRAL VALVE MOBILITY: NORMAL
MITRAL VALVE REGURGITATION: ABNORMAL
RETIRED EF AND QEF - SEE NOTES: 45 (ref 55–65)

## 2018-03-07 PROCEDURE — 93000 ELECTROCARDIOGRAM COMPLETE: CPT | Mod: S$GLB,,, | Performed by: INTERNAL MEDICINE

## 2018-03-07 PROCEDURE — 93306 TTE W/DOPPLER COMPLETE: CPT | Mod: S$GLB,,, | Performed by: INTERNAL MEDICINE

## 2018-03-07 NOTE — PROGRESS NOTES
Patient here for DSE for surgery clearance.  Patient has a left bundle branch block.  Reviewed with Dr. Wilson who reviewed resting echo images and recommended stress test changed to nuclear regadenoson and do resting color flow echo/doppler today only.  12-lead EKG done as ordered as well.  DSE cancelled and patient rescheduled to Friday, 3/9 at 8:30am.  Explained to patient and family above and confirmed plan of care.  Call placed to Dr. Mercado who ordered the test to inform of above.

## 2018-03-08 ENCOUNTER — TELEPHONE (OUTPATIENT)
Dept: CARDIOLOGY | Facility: CLINIC | Age: 78
End: 2018-03-08

## 2018-03-09 ENCOUNTER — TELEPHONE (OUTPATIENT)
Dept: SURGERY | Facility: CLINIC | Age: 78
End: 2018-03-09

## 2018-03-09 ENCOUNTER — CLINICAL SUPPORT (OUTPATIENT)
Dept: CARDIOLOGY | Facility: CLINIC | Age: 78
End: 2018-03-09
Attending: INTERNAL MEDICINE
Payer: MEDICARE

## 2018-03-09 ENCOUNTER — PATIENT MESSAGE (OUTPATIENT)
Dept: SURGERY | Facility: CLINIC | Age: 78
End: 2018-03-09

## 2018-03-09 DIAGNOSIS — I44.7 LEFT BUNDLE BRANCH BLOCK: ICD-10-CM

## 2018-03-09 DIAGNOSIS — R94.31 NONSPECIFIC ABNORMAL ELECTROCARDIOGRAM (ECG) (EKG): ICD-10-CM

## 2018-03-09 LAB — DIASTOLIC DYSFUNCTION: NO

## 2018-03-09 PROCEDURE — A9502 TC99M TETROFOSMIN: HCPCS | Mod: S$GLB,,, | Performed by: INTERNAL MEDICINE

## 2018-03-09 PROCEDURE — 78452 HT MUSCLE IMAGE SPECT MULT: CPT | Mod: S$GLB,,, | Performed by: INTERNAL MEDICINE

## 2018-03-09 PROCEDURE — 93015 CV STRESS TEST SUPVJ I&R: CPT | Mod: S$GLB,,, | Performed by: INTERNAL MEDICINE

## 2018-03-09 NOTE — TELEPHONE ENCOUNTER
----- Message from Capri Mandel sent at 3/9/2018  4:30 PM CST -----  Contact: Pt.'s Daughter  [phone]  Caller states she would like to speak with nurse in reference to getting referral sent over to [facility] please call Pt.'s Daughter  back @  [phone] Thank You :)

## 2018-03-11 RX ORDER — LOSARTAN POTASSIUM 25 MG/1
25 TABLET ORAL DAILY
COMMUNITY
End: 2018-06-06

## 2018-03-11 NOTE — PRE-PROCEDURE INSTRUCTIONS
Preop instructions: NPO after midnight or 8 hours prior to procedure time, shower instructions, directions, leave all valuables at home, medication instructions for PM prior & am of procedure explained. Patient stated an understanding.  Patient denies any side effects or issues with anesthesia or sedation.  Verified patient received instructions to drink 12 ounces Gatorade 2 hours before surgery time.

## 2018-03-12 ENCOUNTER — TELEPHONE (OUTPATIENT)
Dept: SURGERY | Facility: CLINIC | Age: 78
End: 2018-03-12

## 2018-03-12 DIAGNOSIS — C25.9 PANCREATIC ADENOCARCINOMA: Primary | ICD-10-CM

## 2018-03-13 ENCOUNTER — OFFICE VISIT (OUTPATIENT)
Dept: CARDIOLOGY | Facility: CLINIC | Age: 78
End: 2018-03-13
Payer: MEDICARE

## 2018-03-13 VITALS
BODY MASS INDEX: 21.52 KG/M2 | WEIGHT: 129.19 LBS | DIASTOLIC BLOOD PRESSURE: 64 MMHG | HEIGHT: 65 IN | SYSTOLIC BLOOD PRESSURE: 144 MMHG | HEART RATE: 62 BPM

## 2018-03-13 DIAGNOSIS — R94.39 POSITIVE CARDIAC STRESS TEST: Primary | ICD-10-CM

## 2018-03-13 DIAGNOSIS — I44.7 LEFT BUNDLE BRANCH BLOCK: ICD-10-CM

## 2018-03-13 DIAGNOSIS — I11.9 HYPERTENSIVE HEART DISEASE WITHOUT HEART FAILURE: ICD-10-CM

## 2018-03-13 DIAGNOSIS — E11.9 TYPE 2 DIABETES MELLITUS WITHOUT COMPLICATION, WITHOUT LONG-TERM CURRENT USE OF INSULIN: ICD-10-CM

## 2018-03-13 DIAGNOSIS — I10 ESSENTIAL HYPERTENSION: ICD-10-CM

## 2018-03-13 PROCEDURE — 3078F DIAST BP <80 MM HG: CPT | Mod: CPTII,GC,S$GLB, | Performed by: STUDENT IN AN ORGANIZED HEALTH CARE EDUCATION/TRAINING PROGRAM

## 2018-03-13 PROCEDURE — 99204 OFFICE O/P NEW MOD 45 MIN: CPT | Mod: GC,S$GLB,, | Performed by: STUDENT IN AN ORGANIZED HEALTH CARE EDUCATION/TRAINING PROGRAM

## 2018-03-13 PROCEDURE — 99999 PR PBB SHADOW E&M-EST. PATIENT-LVL III: CPT | Mod: PBBFAC,GC,, | Performed by: STUDENT IN AN ORGANIZED HEALTH CARE EDUCATION/TRAINING PROGRAM

## 2018-03-13 PROCEDURE — 3077F SYST BP >= 140 MM HG: CPT | Mod: CPTII,GC,S$GLB, | Performed by: STUDENT IN AN ORGANIZED HEALTH CARE EDUCATION/TRAINING PROGRAM

## 2018-03-13 RX ORDER — ACETAMINOPHEN 160 MG/5ML
200 SUSPENSION, ORAL (FINAL DOSE FORM) ORAL 3 TIMES DAILY
COMMUNITY

## 2018-03-13 NOTE — ASSESSMENT & PLAN NOTE
Patient with a positive nuclear stress test although has no symptoms but is not very active. Additionally this may be false positive due to LBBB and very wide QRS but will need further evaluation with PET stress prior to surgery. If this is negative would proceed with surgery if positive will need to proceed with angiogram.

## 2018-03-13 NOTE — PROGRESS NOTES
"    Cardiology Clinic Note    HPI:   Pt is a 77 year old lady who we are consulted for a positive stress test. (moderate sized area of mild to moderate ischemia from mid to apical anteroseptal wall of of the Lv.     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 She has no symptoms but is not active at home. Does no walk up stairs and does not walk around the block.     Review of Systems   All other systems reviewed and are negative.      PMH:     Past Medical History:   Diagnosis Date    Diabetes     HTN (hypertension)     Pancreatic cancer      Past Surgical History:   Procedure Laterality Date    BLADDER SUSPENSION      HYSTERECTOMY       Allergies:   Review of patient's allergies indicates:  No Known Allergies  Medications:     Current Outpatient Prescriptions on File Prior to Visit   Medication Sig Dispense Refill    amLODIPine (NORVASC) 10 MG tablet Take 10 mg by mouth once daily.      diazePAM (VALIUM) 5 MG tablet Take 5 mg by mouth 2 (two) times daily as needed.       escitalopram oxalate (LEXAPRO) 10 MG tablet Take 10 mg by mouth once daily.   5    losartan (COZAAR) 25 MG tablet Take 25 mg by mouth once daily.      omeprazole (PRILOSEC) 20 MG capsule Take 20 mg by mouth once daily.      ondansetron (ZOFRAN-ODT) 8 MG TbDL 8 mg every 12 (twelve) hours as needed.   0    oxybutynin (DITROPAN-XL) 10 MG 24 hr tablet Take 10 mg by mouth once daily.       No current facility-administered medications on file prior to visit.      Social History:     Social History   Substance Use Topics    Smoking status: Never Smoker    Smokeless tobacco: Never Used    Alcohol use No     Family History:   History reviewed. No pertinent family history.    Physical Exam  BP (!) 144/64 (BP Location: Left arm, Patient Position: Sitting, BP Method: Medium (Automatic))   Pulse 62   Ht 5' 5" " (1.651 m)   Wt 58.6 kg (129 lb 3 oz)   BMI 21.50 kg/m²    GEN: Alert and oriented in NAD  NECK: JVD appreciated   CVS: RRR, s1/s2, no MRG  PULM: CTAB no rales  ABD: NT/ND BS +  Extremities: warm and dry, palpable pulses, no edema  NEURO: Alert and oriented x 3  PSYCH: appropriate affect.           Labs:     Lab Results   Component Value Date     02/28/2018    K 3.9 02/28/2018     02/28/2018    CO2 26 02/28/2018    BUN 34 (H) 02/28/2018    CREATININE 1.1 02/28/2018    ANIONGAP 7 (L) 02/28/2018     Lab Results   Component Value Date    HGBA1C 6.6 (H) 01/24/2018     No results found for: BNP, BNPTRIAGEBLO Lab Results   Component Value Date    WBC 9.28 02/28/2018    HGB 12.1 02/28/2018    HCT 36.0 (L) 02/28/2018     02/28/2018    GRAN 5.7 02/28/2018    GRAN 61.6 02/28/2018     No results found for: CHOL, HDL, LDLCALC, TRIG       EF   Date Value Ref Range Status   03/07/2018 45 55 - 65        EKG: NSR LBBB    Assessment and Plan  Rosamaria Aiken is a 77 y.o. lady here for positive stress test.     Positive cardiac stress test  Patient with a positive nuclear stress test although has no symptoms but is not very active. Additionally this may be false positive due to LBBB and very wide QRS but will need further evaluation with PET stress prior to surgery. If this is negative would proceed with surgery if positive will need to proceed with angiogram.     Diabetes  Management per PCP.     HTN (hypertension)  Continue home medications.       Signed:        Doug Myles MD  Cardiology Fellow  Pager [phone]

## 2018-03-14 ENCOUNTER — TELEPHONE (OUTPATIENT)
Dept: SURGERY | Facility: CLINIC | Age: 78
End: 2018-03-14

## 2018-03-14 DIAGNOSIS — E55.9 VITAMIN D DEFICIENCY: ICD-10-CM

## 2018-03-14 DIAGNOSIS — E56.9 VITAMIN DEFICIENCY: ICD-10-CM

## 2018-03-14 DIAGNOSIS — C25.1 MALIGNANT NEOPLASM OF BODY OF PANCREAS: Primary | ICD-10-CM

## 2018-03-14 NOTE — PROGRESS NOTES
I have seen and examined the patient, reviewed relevant diagnostic tests and agree with the assessment and plan below.     Patient with moderate ischemia in the anteroseptal apical region that may be due to underlying LBBB. Patient is an asymptomatic diabetic with very limited exercise. Before proceeding with an intermediate risk surgery would be advise to repeat another type of stress test to r/o CAD.

## 2018-03-15 ENCOUNTER — PATIENT MESSAGE (OUTPATIENT)
Dept: SURGERY | Facility: CLINIC | Age: 78
End: 2018-03-15

## 2018-03-19 ENCOUNTER — PATIENT MESSAGE (OUTPATIENT)
Dept: SURGERY | Facility: CLINIC | Age: 78
End: 2018-03-19

## 2018-03-21 ENCOUNTER — PATIENT MESSAGE (OUTPATIENT)
Dept: SURGERY | Facility: CLINIC | Age: 78
End: 2018-03-21

## 2018-03-29 ENCOUNTER — CLINICAL SUPPORT (OUTPATIENT)
Dept: CARDIOLOGY | Facility: CLINIC | Age: 78
End: 2018-03-29
Attending: STUDENT IN AN ORGANIZED HEALTH CARE EDUCATION/TRAINING PROGRAM
Payer: MEDICARE

## 2018-03-29 ENCOUNTER — LAB VISIT (OUTPATIENT)
Dept: LAB | Facility: HOSPITAL | Age: 78
End: 2018-03-29
Payer: MEDICARE

## 2018-03-29 DIAGNOSIS — C25.1 MALIGNANT NEOPLASM OF BODY OF PANCREAS: ICD-10-CM

## 2018-03-29 DIAGNOSIS — R94.39 POSITIVE CARDIAC STRESS TEST: ICD-10-CM

## 2018-03-29 DIAGNOSIS — I11.9 HYPERTENSIVE HEART DISEASE WITHOUT HEART FAILURE: ICD-10-CM

## 2018-03-29 LAB
ALBUMIN SERPL BCP-MCNC: 3.4 G/DL
ALP SERPL-CCNC: 64 U/L
ALT SERPL W/O P-5'-P-CCNC: 14 U/L
ANION GAP SERPL CALC-SCNC: 9 MMOL/L
AST SERPL-CCNC: 13 U/L
BASOPHILS # BLD AUTO: 0.08 K/UL
BASOPHILS NFR BLD: 1 %
BILIRUB SERPL-MCNC: 0.4 MG/DL
BUN SERPL-MCNC: 43 MG/DL
CALCIUM SERPL-MCNC: 9.8 MG/DL
CHLORIDE SERPL-SCNC: 106 MMOL/L
CO2 SERPL-SCNC: 26 MMOL/L
CREAT SERPL-MCNC: 1.1 MG/DL
DIFFERENTIAL METHOD: ABNORMAL
EOSINOPHIL # BLD AUTO: 0.8 K/UL
EOSINOPHIL NFR BLD: 9.4 %
ERYTHROCYTE [DISTWIDTH] IN BLOOD BY AUTOMATED COUNT: 12.9 %
EST. GFR  (AFRICAN AMERICAN): 56 ML/MIN/1.73 M^2
EST. GFR  (NON AFRICAN AMERICAN): 48.5 ML/MIN/1.73 M^2
GLUCOSE SERPL-MCNC: 169 MG/DL
HCT VFR BLD AUTO: 38.1 %
HGB BLD-MCNC: 12.5 G/DL
IMM GRANULOCYTES # BLD AUTO: 0.07 K/UL
IMM GRANULOCYTES NFR BLD AUTO: 0.9 %
LYMPHOCYTES # BLD AUTO: 2.7 K/UL
LYMPHOCYTES NFR BLD: 32.7 %
MCH RBC QN AUTO: 31.6 PG
MCHC RBC AUTO-ENTMCNC: 32.8 G/DL
MCV RBC AUTO: 97 FL
MONOCYTES # BLD AUTO: 0.6 K/UL
MONOCYTES NFR BLD: 7.2 %
NEUTROPHILS # BLD AUTO: 4 K/UL
NEUTROPHILS NFR BLD: 48.8 %
NRBC BLD-RTO: 0 /100 WBC
PLATELET # BLD AUTO: 319 K/UL
PMV BLD AUTO: 9.7 FL
POTASSIUM SERPL-SCNC: 4.3 MMOL/L
PREALB SERPL-MCNC: 31 MG/DL
PROT SERPL-MCNC: 6.7 G/DL
RBC # BLD AUTO: 3.95 M/UL
SODIUM SERPL-SCNC: 141 MMOL/L
WBC # BLD AUTO: 8.19 K/UL

## 2018-03-29 PROCEDURE — 36415 COLL VENOUS BLD VENIPUNCTURE: CPT

## 2018-03-29 PROCEDURE — 85025 COMPLETE CBC W/AUTO DIFF WBC: CPT

## 2018-03-29 PROCEDURE — 80053 COMPREHEN METABOLIC PANEL: CPT

## 2018-03-29 PROCEDURE — 84134 ASSAY OF PREALBUMIN: CPT

## 2018-03-29 PROCEDURE — A9555 RB82 RUBIDIUM: HCPCS | Mod: S$GLB,,, | Performed by: INTERNAL MEDICINE

## 2018-03-29 PROCEDURE — 78492 MYOCRD IMG PET MLT RST&STRS: CPT | Mod: S$GLB,,, | Performed by: INTERNAL MEDICINE

## 2018-03-29 PROCEDURE — 93015 CV STRESS TEST SUPVJ I&R: CPT | Mod: S$GLB,,, | Performed by: INTERNAL MEDICINE

## 2018-04-03 ENCOUNTER — PATIENT MESSAGE (OUTPATIENT)
Dept: SURGERY | Facility: CLINIC | Age: 78
End: 2018-04-03

## 2018-04-03 ENCOUNTER — TELEPHONE (OUTPATIENT)
Dept: CARDIOLOGY | Facility: CLINIC | Age: 78
End: 2018-04-03

## 2018-04-03 NOTE — TELEPHONE ENCOUNTER
plz let the patient know that her PET stress is negative and she is low risk and can proceed with non cardiac surgery

## 2018-04-05 ENCOUNTER — PATIENT MESSAGE (OUTPATIENT)
Dept: SURGERY | Facility: CLINIC | Age: 78
End: 2018-04-05

## 2018-04-05 ENCOUNTER — ANESTHESIA EVENT (OUTPATIENT)
Dept: SURGERY | Facility: HOSPITAL | Age: 78
DRG: 827 | End: 2018-04-05
Payer: MEDICARE

## 2018-04-05 ENCOUNTER — TELEPHONE (OUTPATIENT)
Dept: SURGERY | Facility: CLINIC | Age: 78
End: 2018-04-05

## 2018-04-05 DIAGNOSIS — C25.1 MALIGNANT NEOPLASM OF BODY OF PANCREAS: Primary | ICD-10-CM

## 2018-04-05 RX ORDER — METOPROLOL TARTRATE 25 MG/1
50 TABLET, FILM COATED ORAL ONCE
Status: CANCELLED | OUTPATIENT
Start: 2018-04-05

## 2018-04-05 RX ORDER — ZINC GLUCONATE 50 MG
1000 TABLET ORAL DAILY
COMMUNITY

## 2018-04-05 RX ORDER — DEXCHLORPHENIRAMINE MALEATE AND PSEUDOEPHEDRINE HYDROCHLORIDE 2; 60 MG/1; MG/1
TABLET, MULTILAYER ORAL
Refills: 0 | COMMUNITY
Start: 2018-03-26 | End: 2018-04-05

## 2018-04-05 RX ORDER — DOXYCYCLINE HYCLATE 100 MG
TABLET ORAL
Refills: 0 | COMMUNITY
Start: 2018-03-26 | End: 2018-04-05

## 2018-04-05 RX ORDER — ENOXAPARIN SODIUM 100 MG/ML
40 INJECTION SUBCUTANEOUS ONCE
Status: CANCELLED | OUTPATIENT
Start: 2018-04-05

## 2018-04-05 RX ORDER — PANCRELIPASE 24000; 76000; 120000 [USP'U]/1; [USP'U]/1; [USP'U]/1
2 CAPSULE, DELAYED RELEASE PELLETS ORAL
Refills: 1 | COMMUNITY
Start: 2018-03-14 | End: 2022-01-11

## 2018-04-05 RX ORDER — SODIUM CHLORIDE 9 MG/ML
INJECTION, SOLUTION INTRAVENOUS CONTINUOUS
Status: CANCELLED | OUTPATIENT
Start: 2018-04-05

## 2018-04-05 NOTE — TELEPHONE ENCOUNTER
Instructed to shower twice with hibiclens (pm and am), no food after midnight but okay to have clear liquids including 12 oz gatorade before 5am.

## 2018-04-05 NOTE — ANESTHESIA PREPROCEDURE EVALUATION
04/05/2018  Pre-operative evaluation for Procedure(s) (LRB):  PANCREATECTOMY-DISTAL (N/A)  SPLENECTOMY (N/A)    Rosamaria Aiken is a 77 y.o. female with PMH of HTN and DM with newly diagnosed pancreatic mass who is scheduled for above procedure. Patient has had loss of functional status and weight loss over the past few months. Patient went to prehab to improve physical function prior to surgery. Negative PET stress test in March.     Prev airway: None on file    Patient Active Problem List   Diagnosis    Generalized weakness    Bacteremia    Diabetes    HTN (hypertension)    UTI (urinary tract infection)    Pain    Pancreatic mass    Pancolitis    Anemia    Other reduced mobility     Elevated CA 19-9 level    Malignant neoplasm of body of pancreas    Hyperglycemia    Inflammation of soft tissue    Vitamin D insufficiency    Weight loss, unintentional    Dehydration    Positive cardiac stress test       Review of patient's allergies indicates:  No Known Allergies     No current facility-administered medications on file prior to encounter.      Current Outpatient Prescriptions on File Prior to Encounter   Medication Sig Dispense Refill    amLODIPine (NORVASC) 10 MG tablet Take 10 mg by mouth once daily.      diazePAM (VALIUM) 5 MG tablet Take 5 mg by mouth 2 (two) times daily as needed.       escitalopram oxalate (LEXAPRO) 10 MG tablet Take 10 mg by mouth once daily.   5    omeprazole (PRILOSEC) 20 MG capsule Take 20 mg by mouth once daily.      ondansetron (ZOFRAN-ODT) 8 MG TbDL 8 mg every 12 (twelve) hours as needed.   0    oxybutynin (DITROPAN-XL) 10 MG 24 hr tablet Take 10 mg by mouth once daily.         Past Surgical History:   Procedure Laterality Date    BLADDER SUSPENSION      HYSTERECTOMY         Social History     Social History    Marital status:      Spouse name:  N/A    Number of children: N/A    Years of education: N/A     Occupational History    Not on file.     Social History Main Topics    Smoking status: Never Smoker    Smokeless tobacco: Never Used    Alcohol use No    Drug use: No    Sexual activity: Not on file     Other Topics Concern    Not on file     Social History Narrative    No narrative on file         Vital Signs Range (Last 24H):         CBC: No results for input(s): WBC, RBC, HGB, HCT, PLT, MCV, MCH, MCHC in the last 72 hours.    CMP: No results for input(s): NA, K, CL, CO2, BUN, CREATININE, GLU, MG, PHOS, CALCIUM, ALBUMIN, PROT, ALKPHOS, ALT, AST, BILITOT in the last 72 hours.    INR  No results for input(s): PT, INR, PROTIME, APTT in the last 72 hours.      EKG:  Normal sinus rhythm  Left bundle branch block  Abnormal ECG  When compared with ECG of 14-DEC-2017 19:51,  T wave inversion more evident in Inferior leads  Confirmed by Omaira YBARRA, [facility] ([id number]) on 1/2/2018 5:38:31 PM    2D Echo:  CONCLUSIONS     1 - Mild left atrial enlargement.     2 - Eccentric hypertrophy.     3 - Mildly depressed left ventricular systolic function (EF 45-50%).     4 - Wall motion abnormalities.     5 - Impaired LV relaxation, elevated LAP (grade 2 diastolic dysfunction).     6 - Normal right ventricular systolic function .     7 - Mild mitral regurgitation.     8 - Recommend a myocardial perfusion stress test with a vasodilator.       This document has been electronically    SIGNED BY: Pk Wilson MD On: 03/07/2018 15:06    PET Stress 3/29/18:    EKG Conclusions:    1. The EKG portion of this study is uninterpretable for ischemia at a peak heart rate of 63 bpm (46% of predicted).   2. Blood pressure remained stable throughout the protocol  (Presenting BP: 158/74 Peak BP: 149/56).   3. No significant arrhythmias were present.   4. There were no symptoms of chest discomfort or significant dyspnea throughout the protocol.     CONCLUSIONS: NORMAL MYOCARDIAL  PERFUSION PET STRESS TEST  1. The perfusion scan is free of evidence for myocardial ischemia or injury.   2. There is a mild fixed defect in the septal wall consistent with patient's underlying LBBB.  This is a common finding in patients with LBBB and not indicative of obstructive CAD.   2. Resting wall motion is physiologic. Stress wall motion is physiologic. There was abnormal septal motion possibly due to a conduction delay or post surgical changes.   3. LV function is normal at rest and stress.  (normal is >= 51%)  4. The ventricular volumes are normal at rest and stress.   5. The extracardiac distribution of radioactivity is normal.   6. There was no previous study available to compare.      This document has been electronically    SIGNED BY: Jhony Cano MD On: 03/29/2018 17:23    Anesthesia Evaluation    I have reviewed the Patient Summary Reports.    I have reviewed the Nursing Notes.   I have reviewed the Medications.     Review of Systems  Anesthesia Hx:  No problems with previous Anesthesia  History of prior surgery of interest to airway management or planning: Previous anesthesia: General Denies Family Hx of Anesthesia complications.   Denies Personal Hx of Anesthesia complications.   Social:  Non-Smoker    Cardiovascular:   Hypertension Denies MI.  Denies CAD.           Pulmonary:  Pulmonary Normal    Renal/:  Renal/ Normal     Hepatic/GI:   GERD, well controlled Pancreatic mass   Neurological:  Neurology Normal    Endocrine:   Diabetes, type 2        Physical Exam  General:  Well nourished    Airway/Jaw/Neck:  Airway Findings: Mouth Opening: Normal Tongue: Normal  General Airway Assessment: Adult  Mallampati: II  TM Distance: Normal, at least 6 cm  Jaw/Neck Findings:  Neck ROM: Extension Decreased, Mod.      Dental:  Dental Findings: In tact        Mental Status:  Mental Status Findings:  Cooperative, Alert and Oriented         Anesthesia Plan  Type of Anesthesia, risks & benefits  discussed:  Anesthesia Type:  general  Patient's Preference:   Intra-op Monitoring Plan: standard ASA monitors and arterial line  Intra-op Monitoring Plan Comments:   Post Op Pain Control Plan:   Post Op Pain Control Plan Comments:   Induction:   IV  Beta Blocker:  Patient is not currently on a Beta-Blocker (No further documentation required).       Informed Consent: Patient understands risks and agrees with Anesthesia plan.  Questions answered. Anesthesia consent signed with patient.  ASA Score: 3     Day of Surgery Review of History & Physical:    H&P update referred to the surgeon.         Ready For Surgery From Anesthesia Perspective.

## 2018-04-06 ENCOUNTER — ANESTHESIA (OUTPATIENT)
Dept: SURGERY | Facility: HOSPITAL | Age: 78
DRG: 827 | End: 2018-04-06
Payer: MEDICARE

## 2018-04-06 ENCOUNTER — HOSPITAL ENCOUNTER (INPATIENT)
Facility: HOSPITAL | Age: 78
LOS: 5 days | Discharge: HOME HEALTH | DRG: 827 | End: 2018-04-11
Attending: SURGERY | Admitting: SURGERY
Payer: MEDICARE

## 2018-04-06 ENCOUNTER — SURGERY (OUTPATIENT)
Age: 78
End: 2018-04-06

## 2018-04-06 DIAGNOSIS — C25.1 MALIGNANT NEOPLASM OF BODY OF PANCREAS: Primary | ICD-10-CM

## 2018-04-06 LAB
ABO + RH BLD: NORMAL
BLD GP AB SCN CELLS X3 SERPL QL: NORMAL
GLUCOSE SERPL-MCNC: 137 MG/DL (ref 70–110)
HCO3 UR-SCNC: 19.3 MMOL/L (ref 24–28)
HCT VFR BLD CALC: 29 %PCV (ref 36–54)
PCO2 BLDA: 27.9 MMHG (ref 35–45)
PH SMN: 7.45 [PH] (ref 7.35–7.45)
PO2 BLDA: 168 MMHG (ref 80–100)
POC BE: -5 MMOL/L
POC IONIZED CALCIUM: 1.21 MMOL/L (ref 1.06–1.42)
POC SATURATED O2: 100 % (ref 95–100)
POC TCO2: 20 MMOL/L (ref 23–27)
POCT GLUCOSE: 173 MG/DL (ref 70–110)
POCT GLUCOSE: 173 MG/DL (ref 70–110)
POTASSIUM BLD-SCNC: 3.2 MMOL/L (ref 3.5–5.1)
SAMPLE: ABNORMAL
SODIUM BLD-SCNC: 142 MMOL/L (ref 136–145)

## 2018-04-06 PROCEDURE — 36000709 HC OR TIME LEV III EA ADD 15 MIN: Performed by: SURGERY

## 2018-04-06 PROCEDURE — 48140 PARTIAL REMOVAL OF PANCREAS: CPT | Mod: ,,, | Performed by: SURGERY

## 2018-04-06 PROCEDURE — 94761 N-INVAS EAR/PLS OXIMETRY MLT: CPT

## 2018-04-06 PROCEDURE — 37000008 HC ANESTHESIA 1ST 15 MINUTES: Performed by: SURGERY

## 2018-04-06 PROCEDURE — 88342 IMHCHEM/IMCYTCHM 1ST ANTB: CPT | Mod: 26,,, | Performed by: PATHOLOGY

## 2018-04-06 PROCEDURE — 63600175 PHARM REV CODE 636 W HCPCS: Performed by: ANESTHESIOLOGY

## 2018-04-06 PROCEDURE — 86850 RBC ANTIBODY SCREEN: CPT

## 2018-04-06 PROCEDURE — 20600001 HC STEP DOWN PRIVATE ROOM

## 2018-04-06 PROCEDURE — 27201423 OPTIME MED/SURG SUP & DEVICES STERILE SUPPLY: Performed by: SURGERY

## 2018-04-06 PROCEDURE — 71000039 HC RECOVERY, EACH ADD'L HOUR: Performed by: SURGERY

## 2018-04-06 PROCEDURE — 27201040 HC RC 50 FILTER

## 2018-04-06 PROCEDURE — 63600175 PHARM REV CODE 636 W HCPCS: Performed by: SURGERY

## 2018-04-06 PROCEDURE — C1729 CATH, DRAINAGE: HCPCS | Performed by: SURGERY

## 2018-04-06 PROCEDURE — 25000003 PHARM REV CODE 250: Performed by: ANESTHESIOLOGY

## 2018-04-06 PROCEDURE — 71000033 HC RECOVERY, INTIAL HOUR: Performed by: SURGERY

## 2018-04-06 PROCEDURE — 25000003 PHARM REV CODE 250: Performed by: SURGERY

## 2018-04-06 PROCEDURE — 88309 TISSUE EXAM BY PATHOLOGIST: CPT | Mod: 26,,, | Performed by: PATHOLOGY

## 2018-04-06 PROCEDURE — C1751 CATH, INF, PER/CENT/MIDLINE: HCPCS | Performed by: ANESTHESIOLOGY

## 2018-04-06 PROCEDURE — 37000009 HC ANESTHESIA EA ADD 15 MINS: Performed by: SURGERY

## 2018-04-06 PROCEDURE — 07TP0ZZ RESECTION OF SPLEEN, OPEN APPROACH: ICD-10-PCS | Performed by: SURGERY

## 2018-04-06 PROCEDURE — 36620 INSERTION CATHETER ARTERY: CPT | Mod: 59,,, | Performed by: ANESTHESIOLOGY

## 2018-04-06 PROCEDURE — 86920 COMPATIBILITY TEST SPIN: CPT

## 2018-04-06 PROCEDURE — 27000221 HC OXYGEN, UP TO 24 HOURS

## 2018-04-06 PROCEDURE — 27800903 OPTIME MED/SURG SUP & DEVICES OTHER IMPLANTS: Performed by: SURGERY

## 2018-04-06 PROCEDURE — 36000708 HC OR TIME LEV III 1ST 15 MIN: Performed by: SURGERY

## 2018-04-06 PROCEDURE — 88341 IMHCHEM/IMCYTCHM EA ADD ANTB: CPT | Performed by: PATHOLOGY

## 2018-04-06 PROCEDURE — 27201037 HC PRESSURE MONITORING SET UP

## 2018-04-06 PROCEDURE — 88313 SPECIAL STAINS GROUP 2: CPT | Mod: 26,,, | Performed by: PATHOLOGY

## 2018-04-06 PROCEDURE — 0FBG0ZZ EXCISION OF PANCREAS, OPEN APPROACH: ICD-10-PCS | Performed by: SURGERY

## 2018-04-06 PROCEDURE — 94799 UNLISTED PULMONARY SVC/PX: CPT

## 2018-04-06 PROCEDURE — 82962 GLUCOSE BLOOD TEST: CPT | Performed by: SURGERY

## 2018-04-06 PROCEDURE — C9290 INJ, BUPIVACAINE LIPOSOME: HCPCS | Performed by: SURGERY

## 2018-04-06 PROCEDURE — D9220A PRA ANESTHESIA: Mod: ,,, | Performed by: ANESTHESIOLOGY

## 2018-04-06 DEVICE — SEAMGUARD ESCHELON 60 MM.: Type: IMPLANTABLE DEVICE | Site: ABDOMEN | Status: FUNCTIONAL

## 2018-04-06 RX ORDER — SODIUM CHLORIDE 9 MG/ML
INJECTION, SOLUTION INTRAVENOUS CONTINUOUS
Status: DISCONTINUED | OUTPATIENT
Start: 2018-04-06 | End: 2018-04-06

## 2018-04-06 RX ORDER — ENOXAPARIN SODIUM 100 MG/ML
40 INJECTION SUBCUTANEOUS ONCE
Status: COMPLETED | OUTPATIENT
Start: 2018-04-06 | End: 2018-04-06

## 2018-04-06 RX ORDER — METOPROLOL TARTRATE 25 MG/1
50 TABLET, FILM COATED ORAL ONCE
Status: COMPLETED | OUTPATIENT
Start: 2018-04-06 | End: 2018-04-06

## 2018-04-06 RX ORDER — DIAZEPAM 5 MG/1
5 TABLET ORAL 2 TIMES DAILY PRN
Status: DISCONTINUED | OUTPATIENT
Start: 2018-04-06 | End: 2018-04-11 | Stop reason: HOSPADM

## 2018-04-06 RX ORDER — ONDANSETRON 2 MG/ML
INJECTION INTRAMUSCULAR; INTRAVENOUS
Status: DISCONTINUED | OUTPATIENT
Start: 2018-04-06 | End: 2018-04-06

## 2018-04-06 RX ORDER — LEVALBUTEROL INHALATION SOLUTION 0.63 MG/3ML
0.63 SOLUTION RESPIRATORY (INHALATION)
Status: DISPENSED | OUTPATIENT
Start: 2018-04-07 | End: 2018-04-09

## 2018-04-06 RX ORDER — CEFOXITIN 1 G/1
2 INJECTION, POWDER, FOR SOLUTION INTRAVENOUS ONCE
Status: COMPLETED | OUTPATIENT
Start: 2018-04-06 | End: 2018-04-06

## 2018-04-06 RX ORDER — BUPIVACAINE HYDROCHLORIDE 2.5 MG/ML
INJECTION, SOLUTION EPIDURAL; INFILTRATION; INTRACAUDAL
Status: DISCONTINUED | OUTPATIENT
Start: 2018-04-06 | End: 2018-04-06 | Stop reason: HOSPADM

## 2018-04-06 RX ORDER — ROCURONIUM BROMIDE 10 MG/ML
INJECTION, SOLUTION INTRAVENOUS
Status: DISCONTINUED | OUTPATIENT
Start: 2018-04-06 | End: 2018-04-06

## 2018-04-06 RX ORDER — KETAMINE HCL IN 0.9 % NACL 50 MG/5 ML
SYRINGE (ML) INTRAVENOUS
Status: DISCONTINUED | OUTPATIENT
Start: 2018-04-06 | End: 2018-04-06

## 2018-04-06 RX ORDER — ACETAMINOPHEN 10 MG/ML
INJECTION, SOLUTION INTRAVENOUS
Status: DISCONTINUED | OUTPATIENT
Start: 2018-04-06 | End: 2018-04-06

## 2018-04-06 RX ORDER — LIDOCAINE HCL/PF 100 MG/5ML
SYRINGE (ML) INTRAVENOUS
Status: DISCONTINUED | OUTPATIENT
Start: 2018-04-06 | End: 2018-04-06

## 2018-04-06 RX ORDER — NALOXONE HCL 0.4 MG/ML
0.02 VIAL (ML) INJECTION
Status: DISCONTINUED | OUTPATIENT
Start: 2018-04-06 | End: 2018-04-11 | Stop reason: HOSPADM

## 2018-04-06 RX ORDER — GLYCOPYRROLATE 0.2 MG/ML
INJECTION INTRAMUSCULAR; INTRAVENOUS
Status: DISCONTINUED | OUTPATIENT
Start: 2018-04-06 | End: 2018-04-06

## 2018-04-06 RX ORDER — SODIUM CHLORIDE 9 MG/ML
INJECTION, SOLUTION INTRAVENOUS CONTINUOUS
Status: DISCONTINUED | OUTPATIENT
Start: 2018-04-06 | End: 2018-04-10

## 2018-04-06 RX ORDER — PANTOPRAZOLE SODIUM 40 MG/1
40 TABLET, DELAYED RELEASE ORAL DAILY
Status: DISCONTINUED | OUTPATIENT
Start: 2018-04-07 | End: 2018-04-11 | Stop reason: HOSPADM

## 2018-04-06 RX ORDER — PROPOFOL 10 MG/ML
VIAL (ML) INTRAVENOUS
Status: DISCONTINUED | OUTPATIENT
Start: 2018-04-06 | End: 2018-04-06

## 2018-04-06 RX ORDER — ESCITALOPRAM OXALATE 10 MG/1
10 TABLET ORAL DAILY
Status: DISCONTINUED | OUTPATIENT
Start: 2018-04-07 | End: 2018-04-11 | Stop reason: HOSPADM

## 2018-04-06 RX ORDER — MIDAZOLAM HYDROCHLORIDE 1 MG/ML
INJECTION, SOLUTION INTRAMUSCULAR; INTRAVENOUS
Status: DISCONTINUED | OUTPATIENT
Start: 2018-04-06 | End: 2018-04-06

## 2018-04-06 RX ORDER — LIDOCAINE HYDROCHLORIDE 10 MG/ML
1 INJECTION, SOLUTION EPIDURAL; INFILTRATION; INTRACAUDAL; PERINEURAL ONCE
Status: COMPLETED | OUTPATIENT
Start: 2018-04-06 | End: 2018-04-06

## 2018-04-06 RX ORDER — NEOSTIGMINE METHYLSULFATE 1 MG/ML
INJECTION, SOLUTION INTRAVENOUS
Status: DISCONTINUED | OUTPATIENT
Start: 2018-04-06 | End: 2018-04-06

## 2018-04-06 RX ORDER — FENTANYL CITRATE 50 UG/ML
INJECTION, SOLUTION INTRAMUSCULAR; INTRAVENOUS
Status: DISCONTINUED | OUTPATIENT
Start: 2018-04-06 | End: 2018-04-06

## 2018-04-06 RX ORDER — ACETAMINOPHEN 10 MG/ML
1000 INJECTION, SOLUTION INTRAVENOUS EVERY 8 HOURS
Status: DISPENSED | OUTPATIENT
Start: 2018-04-06 | End: 2018-04-07

## 2018-04-06 RX ORDER — AMLODIPINE BESYLATE 10 MG/1
10 TABLET ORAL DAILY
Status: DISCONTINUED | OUTPATIENT
Start: 2018-04-07 | End: 2018-04-11 | Stop reason: HOSPADM

## 2018-04-06 RX ORDER — MORPHINE SULFATE 1 MG/ML
INJECTION INTRAVENOUS CONTINUOUS
Status: DISCONTINUED | OUTPATIENT
Start: 2018-04-06 | End: 2018-04-09

## 2018-04-06 RX ORDER — SODIUM CHLORIDE 0.9 % (FLUSH) 0.9 %
3 SYRINGE (ML) INJECTION
Status: DISCONTINUED | OUTPATIENT
Start: 2018-04-06 | End: 2018-04-06

## 2018-04-06 RX ORDER — HYDROMORPHONE HYDROCHLORIDE 1 MG/ML
0.2 INJECTION, SOLUTION INTRAMUSCULAR; INTRAVENOUS; SUBCUTANEOUS EVERY 5 MIN PRN
Status: DISCONTINUED | OUTPATIENT
Start: 2018-04-06 | End: 2018-04-06

## 2018-04-06 RX ADMIN — ROCURONIUM BROMIDE 10 MG: 10 INJECTION, SOLUTION INTRAVENOUS at 08:04

## 2018-04-06 RX ADMIN — ONDANSETRON 4 MG: 2 INJECTION INTRAMUSCULAR; INTRAVENOUS at 10:04

## 2018-04-06 RX ADMIN — CEFOXITIN 2 G: 1 INJECTION, POWDER, FOR SOLUTION INTRAVENOUS at 09:04

## 2018-04-06 RX ADMIN — NEOSTIGMINE METHYLSULFATE 5 MG: 1 INJECTION INTRAVENOUS at 10:04

## 2018-04-06 RX ADMIN — ROCURONIUM BROMIDE 20 MG: 10 INJECTION, SOLUTION INTRAVENOUS at 09:04

## 2018-04-06 RX ADMIN — Medication: at 11:04

## 2018-04-06 RX ADMIN — PROPOFOL 120 MG: 10 INJECTION, EMULSION INTRAVENOUS at 07:04

## 2018-04-06 RX ADMIN — LIDOCAINE HYDROCHLORIDE 50 MG: 20 INJECTION, SOLUTION INTRAVENOUS at 07:04

## 2018-04-06 RX ADMIN — GLYCOPYRROLATE 0.2 MG: 0.2 INJECTION, SOLUTION INTRAMUSCULAR; INTRAVENOUS at 07:04

## 2018-04-06 RX ADMIN — EPHEDRINE SULFATE 5 MG: 50 INJECTION, SOLUTION INTRAMUSCULAR; INTRAVENOUS; SUBCUTANEOUS at 09:04

## 2018-04-06 RX ADMIN — LIDOCAINE HYDROCHLORIDE 50 MG: 20 INJECTION, SOLUTION INTRAVENOUS at 09:04

## 2018-04-06 RX ADMIN — EPHEDRINE SULFATE 5 MG: 50 INJECTION, SOLUTION INTRAMUSCULAR; INTRAVENOUS; SUBCUTANEOUS at 10:04

## 2018-04-06 RX ADMIN — Medication 0.2 MG: at 11:04

## 2018-04-06 RX ADMIN — MIDAZOLAM HYDROCHLORIDE 1 MG: 1 INJECTION, SOLUTION INTRAMUSCULAR; INTRAVENOUS at 07:04

## 2018-04-06 RX ADMIN — SODIUM CHLORIDE: 0.9 INJECTION, SOLUTION INTRAVENOUS at 11:04

## 2018-04-06 RX ADMIN — BUPIVACAINE 20 ML: 13.3 INJECTION, SUSPENSION, LIPOSOMAL INFILTRATION at 10:04

## 2018-04-06 RX ADMIN — Medication 30 MG: at 07:04

## 2018-04-06 RX ADMIN — EPHEDRINE SULFATE 5 MG: 50 INJECTION, SOLUTION INTRAMUSCULAR; INTRAVENOUS; SUBCUTANEOUS at 07:04

## 2018-04-06 RX ADMIN — METOPROLOL TARTRATE 50 MG: 25 TABLET ORAL at 06:04

## 2018-04-06 RX ADMIN — SODIUM CHLORIDE, SODIUM GLUCONATE, SODIUM ACETATE, POTASSIUM CHLORIDE, MAGNESIUM CHLORIDE, SODIUM PHOSPHATE, DIBASIC, AND POTASSIUM PHOSPHATE: .53; .5; .37; .037; .03; .012; .00082 INJECTION, SOLUTION INTRAVENOUS at 08:04

## 2018-04-06 RX ADMIN — SUGAMMADEX 100 MG: 100 INJECTION, SOLUTION INTRAVENOUS at 10:04

## 2018-04-06 RX ADMIN — ROCURONIUM BROMIDE 35 MG: 10 INJECTION, SOLUTION INTRAVENOUS at 07:04

## 2018-04-06 RX ADMIN — BUPIVACAINE HYDROCHLORIDE 30 ML: 2.5 INJECTION, SOLUTION EPIDURAL; INFILTRATION; INTRACAUDAL; PERINEURAL at 10:04

## 2018-04-06 RX ADMIN — MIDAZOLAM HYDROCHLORIDE 1 MG: 1 INJECTION, SOLUTION INTRAMUSCULAR; INTRAVENOUS at 06:04

## 2018-04-06 RX ADMIN — CEFOXITIN 2 G: 1 INJECTION, POWDER, FOR SOLUTION INTRAVENOUS at 07:04

## 2018-04-06 RX ADMIN — LIDOCAINE HYDROCHLORIDE 0.2 MG: 10 INJECTION, SOLUTION EPIDURAL; INFILTRATION; INTRACAUDAL; PERINEURAL at 06:04

## 2018-04-06 RX ADMIN — FENTANYL CITRATE 100 MCG: 50 INJECTION, SOLUTION INTRAMUSCULAR; INTRAVENOUS at 07:04

## 2018-04-06 RX ADMIN — GLYCOPYRROLATE 0.6 MG: 0.2 INJECTION, SOLUTION INTRAMUSCULAR; INTRAVENOUS at 10:04

## 2018-04-06 RX ADMIN — ENOXAPARIN SODIUM 40 MG: 100 INJECTION SUBCUTANEOUS at 06:04

## 2018-04-06 RX ADMIN — SODIUM CHLORIDE, SODIUM GLUCONATE, SODIUM ACETATE, POTASSIUM CHLORIDE, MAGNESIUM CHLORIDE, SODIUM PHOSPHATE, DIBASIC, AND POTASSIUM PHOSPHATE: .53; .5; .37; .037; .03; .012; .00082 INJECTION, SOLUTION INTRAVENOUS at 07:04

## 2018-04-06 RX ADMIN — FENTANYL CITRATE 50 MCG: 50 INJECTION, SOLUTION INTRAMUSCULAR; INTRAVENOUS at 08:04

## 2018-04-06 RX ADMIN — SODIUM CHLORIDE, SODIUM LACTATE, POTASSIUM CHLORIDE, AND CALCIUM CHLORIDE 500 ML: .6; .31; .03; .02 INJECTION, SOLUTION INTRAVENOUS at 03:04

## 2018-04-06 RX ADMIN — ACETAMINOPHEN 1000 MG: 10 INJECTION, SOLUTION INTRAVENOUS at 08:04

## 2018-04-06 RX ADMIN — ACETAMINOPHEN 1000 MG: 10 INJECTION, SOLUTION INTRAVENOUS at 03:04

## 2018-04-06 RX ADMIN — ROCURONIUM BROMIDE 15 MG: 10 INJECTION, SOLUTION INTRAVENOUS at 07:04

## 2018-04-06 RX ADMIN — SODIUM CHLORIDE: 0.9 INJECTION, SOLUTION INTRAVENOUS at 06:04

## 2018-04-06 NOTE — NURSING TRANSFER
Nursing Transfer Note      4/6/2018     Transfer To: [address] From PACU    Transfer via stretcher    Transfer with cardiac monitoring, IV and PCA pump    Transported by PCT    Medicines sent: none    Chart send with patient: Yes    Notified: spouse, daughter    Patient reassessed at: 4/6/18 @ 1500     Upon arrival to floor:

## 2018-04-06 NOTE — BRIEF OP NOTE
[facility]  Brief Operative Note    SUMMARY     Surgery Date: 4/6/2018     Surgeon(s) and Role:     * Lamonte Arriaga MD - Primary     * Jonathan Schoen, MD    Assisting Surgeon: None    Pre-op Diagnosis:  Malignant neoplasm of body of pancreas [C25.1]    Post-op Diagnosis:  Post-Op Diagnosis Codes:     * Malignant neoplasm of body of pancreas [C25.1]    Procedure(s) (LRB):  PANCREATECTOMY-DISTAL (N/A)  SPLENECTOMY (N/A)    Anesthesia: General    Description of Procedure: open distal pancreatectomy and splenectomy performed uneventfully    Description of the findings of the procedure: palpable area of mass just distal to pancreatic neck    Estimated Blood Loss: 100 mL         Specimens:   Specimen (12h ago through future)    Start     Ordered    04/06/18 0959  Specimen to Pathology - Surgery  Once     Comments:  1) Distal pancreas and spleen, staple line is proximal margin - Permanent      04/06/18 1002

## 2018-04-06 NOTE — OP NOTE
DATE OF PROCEDURE:  04/06/2018     OPERATING SURGEON:  Lamonte Arriaga M.D.    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    OPERATIVE PROCEDURES:  Distal pancreatectomy and splenectomy.    PROCEDURE IN DETAIL:  The patient is placed in the supine position on the   operating table.  Adequate general endotracheal anesthesia is induced.  The   abdomen is prepped and draped in the sterile fashion and entered through a   limited upper midline incision just adequate to permit manual exploration of the   abdominal cavity.  There is no ascites.  There is no evidence of peritoneal or   omental metastasis.  The liver is normal without focal lesions.  The incision is   extended to a full upper midline and the abdomen is explored again confirming   the findings noted above.  The gastrocolic omentum is opened staying outside of   the right gastroepiploic arcade.  Proximally, the greater curvature of the   stomach is mobilized in its entirety taking down the short gastric vessels using   the LigaSure distally.  The right gastroepiploic pedicle is followed to the   base of the mesentery and the superior mesenteric vein is isolated.  The neck of   the pancreas is carefully elevated off of the superior mesenteric vein just to   the patient's left of the gastroduodenal artery.  The pancreas at this point is   soft and normal.  In the proximal body of the pancreas, there is a hard mass,   perhaps 3.5 cm in diameter, which appears in a limited fashion to be invading   peripancreatic soft tissue.  Upstream the pancreatic duct is massively dilated.    Using the Endo-NORBERTO green load with SeamGuard, the neck of the pancreas is   divided just to the left of the gastroduodenal artery.  This provides about a 2   cm margin of normal pancreas proximal to the tumor.  No dissection around the   distal celiac axis,  "proximal hepatic and left gastric vessels is done bringing   these nodes down to the splenic artery.  The splenic artery is isolated and is   taken at its takeoff between 2-0 silk ties and the proximal stump is oversewn   with 3-0 silk.  The splenic vein and inferior mesenteric vein are sequentially   isolated and divided between 2-0 silk ties and the stump of the splenic vein is   also oversewn with 3-0 silk.  The inferior border of the pancreas is then   mobilized without difficulty.  The lienocolic "ligament" is taken down using the   LigaSure.  The lateral peritoneal reflection of the spleen is opened and the   spleen is mobilized and the specimen is then freed from the filmy attachments in   the retroperitoneum and our resection is now complete.  The specimen is   submitted for permanent sections.  The operative field is irrigated with sterile   saline.  Hemostasis is excellent.  The pancreatic stump closure is examined and   is in very good shape.  No drains are used.  Exparel solution is infiltrated   into the deep muscular layers on either side of the incision.  The abdominal   incision is then closed in layers in the usual fashion and a sterile dressing is   applied.  The patient tolerates the procedure well and is brought to the   recovery room in stable condition.      DARWIN  dd: 04/06/2018 11:09:36 (CDT)  td: 04/06/2018 11:55:55 (CDT)  Doc ID   #[id number]  Job ID #[id number]    CC:   "

## 2018-04-06 NOTE — ANESTHESIA PROCEDURE NOTES
Arterial    Diagnosis: Pancreatic Mass    Patient location during procedure: done in OR  Procedure start time: 4/6/2018 7:20 AM  Timeout: 4/6/2018 7:20 AM  Procedure end time: 4/6/2018 7:25 AM  Staffing  Anesthesiologist: FINESSE EVERETT  Resident/CRNA: GARCIA KHOURY  Performed: resident/CRNA   Anesthesiologist was present at the time of the procedure.Arterial  Skin Prep: chlorhexidine gluconate  Local Infiltration: none  Orientation: left  Location: radial  Catheter Size: 20 G  Catheter placement by Anatomical landmarks. Heme positive aspiration all ports.Insertion Attempts: 1  Assessment  Dressing: secured with tape and tegaderm  Patient: Tolerated well

## 2018-04-06 NOTE — TRANSFER OF CARE
"Anesthesia Transfer of Care Note    Patient: Rosamaria Aiken    Procedure(s) Performed: Procedure(s) (LRB):  PANCREATECTOMY-DISTAL (N/A)  SPLENECTOMY (N/A)    Patient location: PACU    Anesthesia Type: general    Transport from OR: Transported from OR on 100% O2 by closed face mask with adequate spontaneous ventilation    Post pain: adequate analgesia    Post assessment: no apparent anesthetic complications and tolerated procedure well    Post vital signs: stable    Level of consciousness: awake, alert and oriented    Nausea/Vomiting: no nausea/vomiting    Complications: none          Last vitals:   Visit Vitals  /63   Pulse (!) 48   Temp 36.5 °C (97.7 °F) (Temporal)   Resp 17   Ht 5' 5.5" (1.664 m)   Wt 59.9 kg (132 lb)   SpO2 95%   Breastfeeding? No   BMI 21.63 kg/m²     "

## 2018-04-06 NOTE — H&P
78 yo female with neoplasm of the proximal body of the pancreas with marked upstream dilation of the pancreatic duct, who presented with weight loss and significant constitutional symptoms several months ago but no pain. She has participated in our Prehabilitation program and her functional status and debility have markedly improved, albeit not all the way to normal. She has undergone preop cardiac testing which reveals no evidence of reversible ischemia and normal EF.   She now feels well and is admitted for distal pancreatectomy and splenectomy. She has received preop splenectomy vaccinations..     Past Medical History:   Diagnosis Date    Diabetes     HTN (hypertension)     Pancreatic cancer        Past Surgical History:   Procedure Laterality Date    BLADDER SUSPENSION      HYSTERECTOMY       Current Discharge Medication List      CONTINUE these medications which have NOT CHANGED    Details   amLODIPine (NORVASC) 10 MG tablet Take 10 mg by mouth once daily.      co-enzyme Q-10 30 mg capsule Take 200 mg by mouth 3 (three) times daily.      CREON 24,000-76,000 -120,000 unit capsule Take 2 capsules by mouth 3 (three) times daily with meals.   Refills: 1      cyanocobalamin (VITAMIN B-12) 1000 MCG tablet Take 100 mcg by mouth once daily.      diazePAM (VALIUM) 5 MG tablet Take 5 mg by mouth 2 (two) times daily as needed.       escitalopram oxalate (LEXAPRO) 10 MG tablet Take 10 mg by mouth once daily.   Refills: 5      losartan (COZAAR) 25 MG tablet Take 25 mg by mouth once daily.      omeprazole (PRILOSEC) 20 MG capsule Take 20 mg by mouth once daily.      ondansetron (ZOFRAN-ODT) 8 MG TbDL 8 mg every 12 (twelve) hours as needed.   Refills: 0      oxybutynin (DITROPAN-XL) 10 MG 24 hr tablet Take 10 mg by mouth once daily.      VIT E ACET-MIN OIL-DIMETHICONE TOP            Review of patient's allergies indicates:  No Known Allergies    ROS: negative other than HPI. Thought she might have a sinus infection  last week and saw her PCP who prescribed antibiotics. 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 She understands and agrees

## 2018-04-06 NOTE — BRIEF OP NOTE
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 No vascular adherence or invasion. DPS performed. The neck of the pancreas was divided using the NORBERTO green stapler reinforced with Seamguard.  EBL: 150 ccs  CPT code:

## 2018-04-06 NOTE — PROGRESS NOTES
Dr Gagnon notified of low urinary output, new orders received, Blanca Mercer NP paged, awaiting call back.

## 2018-04-07 LAB
ALBUMIN SERPL BCP-MCNC: 2.9 G/DL
ALP SERPL-CCNC: 58 U/L
ALT SERPL W/O P-5'-P-CCNC: 72 U/L
ANION GAP SERPL CALC-SCNC: 9 MMOL/L
AST SERPL-CCNC: 49 U/L
BASOPHILS # BLD AUTO: 0.03 K/UL
BASOPHILS NFR BLD: 0.2 %
BILIRUB SERPL-MCNC: 0.5 MG/DL
BUN SERPL-MCNC: 27 MG/DL
CALCIUM SERPL-MCNC: 8.4 MG/DL
CHLORIDE SERPL-SCNC: 110 MMOL/L
CO2 SERPL-SCNC: 21 MMOL/L
CREAT SERPL-MCNC: 1.1 MG/DL
DIFFERENTIAL METHOD: ABNORMAL
EOSINOPHIL # BLD AUTO: 0 K/UL
EOSINOPHIL NFR BLD: 0 %
ERYTHROCYTE [DISTWIDTH] IN BLOOD BY AUTOMATED COUNT: 13 %
EST. GFR  (AFRICAN AMERICAN): 56 ML/MIN/1.73 M^2
EST. GFR  (NON AFRICAN AMERICAN): 48.5 ML/MIN/1.73 M^2
GLUCOSE SERPL-MCNC: 222 MG/DL
HCT VFR BLD AUTO: 38.3 %
HGB BLD-MCNC: 12.5 G/DL
IMM GRANULOCYTES # BLD AUTO: 0.1 K/UL
IMM GRANULOCYTES NFR BLD AUTO: 0.6 %
LYMPHOCYTES # BLD AUTO: 1.1 K/UL
LYMPHOCYTES NFR BLD: 6.4 %
MAGNESIUM SERPL-MCNC: 1.9 MG/DL
MCH RBC QN AUTO: 31.8 PG
MCHC RBC AUTO-ENTMCNC: 32.6 G/DL
MCV RBC AUTO: 98 FL
MONOCYTES # BLD AUTO: 1.2 K/UL
MONOCYTES NFR BLD: 6.9 %
NEUTROPHILS # BLD AUTO: 14.6 K/UL
NEUTROPHILS NFR BLD: 85.9 %
NRBC BLD-RTO: 0 /100 WBC
PHOSPHATE SERPL-MCNC: 3.7 MG/DL
PLATELET # BLD AUTO: 251 K/UL
PMV BLD AUTO: 10 FL
POTASSIUM SERPL-SCNC: 4.5 MMOL/L
PROT SERPL-MCNC: 5.9 G/DL
RBC # BLD AUTO: 3.93 M/UL
SODIUM SERPL-SCNC: 140 MMOL/L
WBC # BLD AUTO: 17.01 K/UL

## 2018-04-07 PROCEDURE — 20600001 HC STEP DOWN PRIVATE ROOM

## 2018-04-07 PROCEDURE — 25000003 PHARM REV CODE 250: Performed by: SURGERY

## 2018-04-07 PROCEDURE — 85025 COMPLETE CBC W/AUTO DIFF WBC: CPT

## 2018-04-07 PROCEDURE — 94640 AIRWAY INHALATION TREATMENT: CPT

## 2018-04-07 PROCEDURE — 63600175 PHARM REV CODE 636 W HCPCS: Performed by: SURGERY

## 2018-04-07 PROCEDURE — 94761 N-INVAS EAR/PLS OXIMETRY MLT: CPT

## 2018-04-07 PROCEDURE — 83735 ASSAY OF MAGNESIUM: CPT

## 2018-04-07 PROCEDURE — 80053 COMPREHEN METABOLIC PANEL: CPT

## 2018-04-07 PROCEDURE — 27000221 HC OXYGEN, UP TO 24 HOURS

## 2018-04-07 PROCEDURE — 25000242 PHARM REV CODE 250 ALT 637 W/ HCPCS: Performed by: NURSE PRACTITIONER

## 2018-04-07 PROCEDURE — 84100 ASSAY OF PHOSPHORUS: CPT

## 2018-04-07 PROCEDURE — 51798 US URINE CAPACITY MEASURE: CPT

## 2018-04-07 PROCEDURE — 36415 COLL VENOUS BLD VENIPUNCTURE: CPT

## 2018-04-07 PROCEDURE — 63600175 PHARM REV CODE 636 W HCPCS: Performed by: STUDENT IN AN ORGANIZED HEALTH CARE EDUCATION/TRAINING PROGRAM

## 2018-04-07 RX ORDER — HYDRALAZINE HYDROCHLORIDE 20 MG/ML
10 INJECTION INTRAMUSCULAR; INTRAVENOUS ONCE
Status: COMPLETED | OUTPATIENT
Start: 2018-04-07 | End: 2018-04-07

## 2018-04-07 RX ORDER — PROMETHAZINE HYDROCHLORIDE 12.5 MG/1
12.5 SUPPOSITORY RECTAL EVERY 6 HOURS PRN
Status: DISCONTINUED | OUTPATIENT
Start: 2018-04-07 | End: 2018-04-07

## 2018-04-07 RX ORDER — ENOXAPARIN SODIUM 100 MG/ML
40 INJECTION SUBCUTANEOUS EVERY 24 HOURS
Status: DISCONTINUED | OUTPATIENT
Start: 2018-04-07 | End: 2018-04-11 | Stop reason: HOSPADM

## 2018-04-07 RX ORDER — KETOROLAC TROMETHAMINE 30 MG/ML
30 INJECTION, SOLUTION INTRAMUSCULAR; INTRAVENOUS EVERY 8 HOURS
Status: COMPLETED | OUTPATIENT
Start: 2018-04-07 | End: 2018-04-08

## 2018-04-07 RX ADMIN — LEVALBUTEROL HYDROCHLORIDE 0.63 MG: 0.63 SOLUTION RESPIRATORY (INHALATION) at 04:04

## 2018-04-07 RX ADMIN — HYDRALAZINE HYDROCHLORIDE 10 MG: 20 INJECTION INTRAMUSCULAR; INTRAVENOUS at 01:04

## 2018-04-07 RX ADMIN — ESCITALOPRAM OXALATE 10 MG: 10 TABLET ORAL at 09:04

## 2018-04-07 RX ADMIN — KETOROLAC TROMETHAMINE 30 MG: 30 INJECTION, SOLUTION INTRAMUSCULAR; INTRAVENOUS at 10:04

## 2018-04-07 RX ADMIN — LEVALBUTEROL HYDROCHLORIDE 0.63 MG: 0.63 SOLUTION RESPIRATORY (INHALATION) at 07:04

## 2018-04-07 RX ADMIN — KETOROLAC TROMETHAMINE 30 MG: 30 INJECTION, SOLUTION INTRAMUSCULAR; INTRAVENOUS at 02:04

## 2018-04-07 RX ADMIN — SODIUM CHLORIDE: 0.9 INJECTION, SOLUTION INTRAVENOUS at 02:04

## 2018-04-07 RX ADMIN — ACETAMINOPHEN 1000 MG: 10 INJECTION, SOLUTION INTRAVENOUS at 01:04

## 2018-04-07 RX ADMIN — AMLODIPINE BESYLATE 10 MG: 10 TABLET ORAL at 09:04

## 2018-04-07 RX ADMIN — SODIUM CHLORIDE: 0.9 INJECTION, SOLUTION INTRAVENOUS at 10:04

## 2018-04-07 RX ADMIN — PANTOPRAZOLE SODIUM 40 MG: 40 TABLET, DELAYED RELEASE ORAL at 09:04

## 2018-04-07 RX ADMIN — Medication: at 06:04

## 2018-04-07 RX ADMIN — ENOXAPARIN SODIUM 40 MG: 100 INJECTION SUBCUTANEOUS at 04:04

## 2018-04-07 NOTE — PLAN OF CARE
Problem: Patient Care Overview  Goal: Plan of Care Review  Outcome: Ongoing (interventions implemented as appropriate)  Pt is AOx4 and was injury free during night shift.  Daughter spent the night.  Breathing is regular equal and unlabored bilaterally on 2 liters of oxygen.  The morphine PCA controls the pt's pain. Heard to gravity during night shift.  At end of night shift the heard was removed, the pt understands that she will need to pee by 1230 pm in the hat in the toilet.  Pt's midline is closed with durmabond and is intact with no leakage.

## 2018-04-07 NOTE — PROGRESS NOTES
Dr Louis was notified of pt's b/p being 160 at start of shift and at midnight the b/p was up to 166.  Dr Louis instructed the nurse to order 10mg IV Hydralazine for a one time dose now.

## 2018-04-07 NOTE — PROGRESS NOTES
[facility]  General Surgery  Progress Note    Subjective:         Post-Op Info:  Procedure(s) (LRB):  PANCREATECTOMY-DISTAL (N/A)  SPLENECTOMY (N/A)   1 Day Post-Op     Interval History: POD1 from PANCREATECTOMY-DISTAL and SPLENECTOMY. NAEON       Medications:  Continuous Infusions:   sodium chloride 0.9% 125 mL/hr at 04/07/18 0246    morphine       Scheduled Meds:   acetaminophen  1,000 mg Intravenous Q8H    amLODIPine  10 mg Oral Daily    escitalopram oxalate  10 mg Oral Daily    levalbuterol  0.63 mg Nebulization Q8H WA    pantoprazole  40 mg Oral Daily    promethazine (PHENERGAN) IVPB  12.5 mg Intravenous Once     PRN Meds:diazePAM, naloxone     Review of patient's allergies indicates:  No Known Allergies  Objective:     Vital Signs (Most Recent):  Temp: 97 °F (36.1 °C) (04/07/18 1124)  Pulse: 82 (04/07/18 1124)  Resp: 14 (04/07/18 1124)  BP: (!) 154/67 (04/07/18 1124)  SpO2: (!) 92 % (04/07/18 1124) Vital Signs (24h Range):  Temp:  [96.7 °F (35.9 °C)-98.2 °F (36.8 °C)] 97 °F (36.1 °C)  Pulse:  [41-83] 82  Resp:  [14-25] 14  SpO2:  [92 %-97 %] 92 %  BP: (120-173)/(56-74) 154/67     Weight: 66 kg (145 lb 8.1 oz)  Body mass index is 24.21 kg/m².    Intake/Output - Last 3 Shifts       04/05 0700 - 04/06 0659 04/06 0700 - 04/07 0659 04/07 0700 - 04/08 0659    P.O.   60    I.V. (mL/kg)  4580 (69.4) 500 (7.6)    IV Piggyback  100     Total Intake(mL/kg)  4680 (70.9) 560 (8.5)    Urine (mL/kg/hr)  1415 (0.9)     Stool  0 (0)     Blood  100 (0.1)     Total Output   1515      Net   +3165 +560           Stool Occurrence  0 x           Physical Exam   Constitutional: She is oriented to person, place, and time. She appears well-nourished. No distress.   HENT:   Head: Normocephalic and atraumatic.   Right Ear: External ear normal.   Left Ear: External ear normal.   Eyes: Conjunctivae and EOM are normal. Pupils are equal, round, and reactive to light.   Neck: Normal range of motion. Neck supple.    Cardiovascular: Normal rate and intact distal pulses.    Pulmonary/Chest: Effort normal. No stridor. No respiratory distress. She has no wheezes.   Abdominal: Soft. She exhibits no distension. There is no tenderness.   Neurological: She is alert and oriented to person, place, and time. No cranial nerve deficit.   Skin: Skin is warm and dry. Capillary refill takes less than 2 seconds. She is not diaphoretic. No erythema. No pallor.   Psychiatric: She has a normal mood and affect. Her behavior is normal. Judgment and thought content normal.   Incision c/d/i    Significant Labs:  CBC:   Recent Labs  Lab 04/07/18 0352   WBC 17.01*   RBC 3.93*   HGB 12.5   HCT 38.3      MCV 98   MCH 31.8*   MCHC 32.6     BMP:   Recent Labs  Lab 04/07/18 0352   *      K 4.5      CO2 21*   BUN 27*   CREATININE 1.1   CALCIUM 8.4*   MG 1.9     CMP:   Recent Labs  Lab 04/07/18  0352   *   CALCIUM 8.4*   ALBUMIN 2.9*   PROT 5.9*      K 4.5   CO2 21*      BUN 27*   CREATININE 1.1   ALKPHOS 58   ALT 72*   AST 49*   BILITOT 0.5     LFTs:   Recent Labs  Lab 04/07/18  0352   ALT 72*   AST 49*   ALKPHOS 58   BILITOT 0.5   PROT 5.9*   ALBUMIN 2.9*       Significant Diagnostics:  I have reviewed all pertinent imaging results/findings within the past 24 hours.    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

## 2018-04-07 NOTE — SUBJECTIVE & OBJECTIVE
Interval History: POD1 from PANCREATECTOMY-DISTAL and SPLENECTOMY. NAEON       Medications:  Continuous Infusions:   sodium chloride 0.9% 125 mL/hr at 04/07/18 0246    morphine       Scheduled Meds:   acetaminophen  1,000 mg Intravenous Q8H    amLODIPine  10 mg Oral Daily    escitalopram oxalate  10 mg Oral Daily    levalbuterol  0.63 mg Nebulization Q8H WA    pantoprazole  40 mg Oral Daily    promethazine (PHENERGAN) IVPB  12.5 mg Intravenous Once     PRN Meds:diazePAM, naloxone     Review of patient's allergies indicates:  No Known Allergies  Objective:     Vital Signs (Most Recent):  Temp: 97 °F (36.1 °C) (04/07/18 1124)  Pulse: 82 (04/07/18 1124)  Resp: 14 (04/07/18 1124)  BP: (!) 154/67 (04/07/18 1124)  SpO2: (!) 92 % (04/07/18 1124) Vital Signs (24h Range):  Temp:  [96.7 °F (35.9 °C)-98.2 °F (36.8 °C)] 97 °F (36.1 °C)  Pulse:  [41-83] 82  Resp:  [14-25] 14  SpO2:  [92 %-97 %] 92 %  BP: (120-173)/(56-74) 154/67     Weight: 66 kg (145 lb 8.1 oz)  Body mass index is 24.21 kg/m².    Intake/Output - Last 3 Shifts       04/05 0700 - 04/06 0659 04/06 0700 - 04/07 0659 04/07 0700 - 04/08 0659    P.O.   60    I.V. (mL/kg)  4580 (69.4) 500 (7.6)    IV Piggyback  100     Total Intake(mL/kg)  4680 (70.9) 560 (8.5)    Urine (mL/kg/hr)  1415 (0.9)     Stool  0 (0)     Blood  100 (0.1)     Total Output   1515      Net   +3165 +560           Stool Occurrence  0 x           Physical Exam   Constitutional: She is oriented to person, place, and time. She appears well-nourished. No distress.   HENT:   Head: Normocephalic and atraumatic.   Right Ear: External ear normal.   Left Ear: External ear normal.   Eyes: Conjunctivae and EOM are normal. Pupils are equal, round, and reactive to light.   Neck: Normal range of motion. Neck supple.   Cardiovascular: Normal rate and intact distal pulses.    Pulmonary/Chest: Effort normal. No stridor. No respiratory distress. She has no wheezes.   Abdominal: Soft. She exhibits no  distension. There is no tenderness.   Neurological: She is alert and oriented to person, place, and time. No cranial nerve deficit.   Skin: Skin is warm and dry. Capillary refill takes less than 2 seconds. She is not diaphoretic. No erythema. No pallor.   Psychiatric: She has a normal mood and affect. Her behavior is normal. Judgment and thought content normal.   Incision c/d/i    Significant Labs:  CBC:   Recent Labs  Lab 04/07/18 0352   WBC 17.01*   RBC 3.93*   HGB 12.5   HCT 38.3      MCV 98   MCH 31.8*   MCHC 32.6     BMP:   Recent Labs  Lab 04/07/18 0352   *      K 4.5      CO2 21*   BUN 27*   CREATININE 1.1   CALCIUM 8.4*   MG 1.9     CMP:   Recent Labs  Lab 04/07/18  0352   *   CALCIUM 8.4*   ALBUMIN 2.9*   PROT 5.9*      K 4.5   CO2 21*      BUN 27*   CREATININE 1.1   ALKPHOS 58   ALT 72*   AST 49*   BILITOT 0.5     LFTs:   Recent Labs  Lab 04/07/18  0352   ALT 72*   AST 49*   ALKPHOS 58   BILITOT 0.5   PROT 5.9*   ALBUMIN 2.9*       Significant Diagnostics:  I have reviewed all pertinent imaging results/findings within the past 24 hours.

## 2018-04-07 NOTE — PLAN OF CARE
Problem: Patient Care Overview  Goal: Plan of Care Review  Outcome: Ongoing (interventions implemented as appropriate)  POC reviewed with patient who verbalized understanding. AAOX4.. Pt O2 sats above 90% on 3L NC. Pt in and out of sleep. Pt has been drowsy but easily to arouse.  Pt NPO. Left FA Iv intact and patent. Right wrist Iv intact and patent. Pt on tele running NSR in 80s.  Pt walked in room. Keen removed this AM, pt did not void. Pt bladder scanned and MD notified and ordered a straight cath @ 1300 and drained 500cc of urine. WCTM.  Pain being controlled with PCA.  Pt remained free from falls throughout the shift. VSS. Will continue to monitor.

## 2018-04-07 NOTE — PT/OT/SLP PROGRESS
Physical Therapy      Patient Name:  Rosamaria Aiken   MRN:  [id number]    Patient not seen today secondary to patient stating she did not feel well and was nauseous. PT attempted to see patient at 0926, 1003, and 1106. Will follow-up at next available date.    Angela Arana, PT

## 2018-04-08 LAB
ALBUMIN SERPL BCP-MCNC: 2.2 G/DL
ALP SERPL-CCNC: 56 U/L
ALT SERPL W/O P-5'-P-CCNC: 51 U/L
ANION GAP SERPL CALC-SCNC: 7 MMOL/L
AST SERPL-CCNC: 33 U/L
BASOPHILS # BLD AUTO: 0.04 K/UL
BASOPHILS # BLD AUTO: 0.07 K/UL
BASOPHILS NFR BLD: 0.2 %
BASOPHILS NFR BLD: 0.4 %
BILIRUB SERPL-MCNC: 0.4 MG/DL
BUN SERPL-MCNC: 22 MG/DL
CALCIUM SERPL-MCNC: 6.7 MG/DL
CHLORIDE SERPL-SCNC: 117 MMOL/L
CO2 SERPL-SCNC: 19 MMOL/L
CREAT SERPL-MCNC: 0.7 MG/DL
DIFFERENTIAL METHOD: ABNORMAL
DIFFERENTIAL METHOD: ABNORMAL
EOSINOPHIL # BLD AUTO: 0.1 K/UL
EOSINOPHIL # BLD AUTO: 0.2 K/UL
EOSINOPHIL NFR BLD: 0.6 %
EOSINOPHIL NFR BLD: 1 %
ERYTHROCYTE [DISTWIDTH] IN BLOOD BY AUTOMATED COUNT: 13.4 %
ERYTHROCYTE [DISTWIDTH] IN BLOOD BY AUTOMATED COUNT: 13.5 %
EST. GFR  (AFRICAN AMERICAN): >60 ML/MIN/1.73 M^2
EST. GFR  (NON AFRICAN AMERICAN): >60 ML/MIN/1.73 M^2
GLUCOSE SERPL-MCNC: 144 MG/DL
HCT VFR BLD AUTO: 30.7 %
HCT VFR BLD AUTO: 32.9 %
HGB BLD-MCNC: 10.5 G/DL
HGB BLD-MCNC: 9.7 G/DL
IMM GRANULOCYTES # BLD AUTO: 0.1 K/UL
IMM GRANULOCYTES # BLD AUTO: 0.15 K/UL
IMM GRANULOCYTES NFR BLD AUTO: 0.6 %
IMM GRANULOCYTES NFR BLD AUTO: 0.9 %
LYMPHOCYTES # BLD AUTO: 0.8 K/UL
LYMPHOCYTES # BLD AUTO: 1.2 K/UL
LYMPHOCYTES NFR BLD: 4.4 %
LYMPHOCYTES NFR BLD: 6.7 %
MAGNESIUM SERPL-MCNC: 1.7 MG/DL
MCH RBC QN AUTO: 31.7 PG
MCH RBC QN AUTO: 31.9 PG
MCHC RBC AUTO-ENTMCNC: 31.6 G/DL
MCHC RBC AUTO-ENTMCNC: 31.9 G/DL
MCV RBC AUTO: 101 FL
MCV RBC AUTO: 99 FL
MONOCYTES # BLD AUTO: 1.1 K/UL
MONOCYTES # BLD AUTO: 1.2 K/UL
MONOCYTES NFR BLD: 5.9 %
MONOCYTES NFR BLD: 6.9 %
NEUTROPHILS # BLD AUTO: 14.9 K/UL
NEUTROPHILS # BLD AUTO: 15.5 K/UL
NEUTROPHILS NFR BLD: 85.4 %
NEUTROPHILS NFR BLD: 87 %
NRBC BLD-RTO: 0 /100 WBC
NRBC BLD-RTO: 0 /100 WBC
PHOSPHATE SERPL-MCNC: 1.4 MG/DL
PLATELET # BLD AUTO: 211 K/UL
PLATELET # BLD AUTO: 217 K/UL
PMV BLD AUTO: 10.1 FL
PMV BLD AUTO: 10.5 FL
POTASSIUM SERPL-SCNC: 3.6 MMOL/L
PROT SERPL-MCNC: 4.8 G/DL
RBC # BLD AUTO: 3.04 M/UL
RBC # BLD AUTO: 3.31 M/UL
SODIUM SERPL-SCNC: 143 MMOL/L
WBC # BLD AUTO: 17.13 K/UL
WBC # BLD AUTO: 18.11 K/UL

## 2018-04-08 PROCEDURE — 85025 COMPLETE CBC W/AUTO DIFF WBC: CPT

## 2018-04-08 PROCEDURE — 27000221 HC OXYGEN, UP TO 24 HOURS

## 2018-04-08 PROCEDURE — 97535 SELF CARE MNGMENT TRAINING: CPT

## 2018-04-08 PROCEDURE — 80053 COMPREHEN METABOLIC PANEL: CPT

## 2018-04-08 PROCEDURE — 20600001 HC STEP DOWN PRIVATE ROOM

## 2018-04-08 PROCEDURE — G8987 SELF CARE CURRENT STATUS: HCPCS | Mod: CJ

## 2018-04-08 PROCEDURE — 84100 ASSAY OF PHOSPHORUS: CPT

## 2018-04-08 PROCEDURE — 36415 COLL VENOUS BLD VENIPUNCTURE: CPT

## 2018-04-08 PROCEDURE — 63600175 PHARM REV CODE 636 W HCPCS: Performed by: SURGERY

## 2018-04-08 PROCEDURE — 25000242 PHARM REV CODE 250 ALT 637 W/ HCPCS: Performed by: NURSE PRACTITIONER

## 2018-04-08 PROCEDURE — 97166 OT EVAL MOD COMPLEX 45 MIN: CPT

## 2018-04-08 PROCEDURE — 83735 ASSAY OF MAGNESIUM: CPT

## 2018-04-08 PROCEDURE — G8988 SELF CARE GOAL STATUS: HCPCS | Mod: CI

## 2018-04-08 PROCEDURE — 94640 AIRWAY INHALATION TREATMENT: CPT

## 2018-04-08 PROCEDURE — 97161 PT EVAL LOW COMPLEX 20 MIN: CPT

## 2018-04-08 PROCEDURE — 94761 N-INVAS EAR/PLS OXIMETRY MLT: CPT

## 2018-04-08 PROCEDURE — 97116 GAIT TRAINING THERAPY: CPT

## 2018-04-08 PROCEDURE — 25000003 PHARM REV CODE 250: Performed by: SURGERY

## 2018-04-08 RX ADMIN — ENOXAPARIN SODIUM 40 MG: 100 INJECTION SUBCUTANEOUS at 10:04

## 2018-04-08 RX ADMIN — PANTOPRAZOLE SODIUM 40 MG: 40 TABLET, DELAYED RELEASE ORAL at 10:04

## 2018-04-08 RX ADMIN — AMLODIPINE BESYLATE 10 MG: 10 TABLET ORAL at 10:04

## 2018-04-08 RX ADMIN — Medication: at 03:04

## 2018-04-08 RX ADMIN — KETOROLAC TROMETHAMINE 30 MG: 30 INJECTION, SOLUTION INTRAMUSCULAR; INTRAVENOUS at 06:04

## 2018-04-08 RX ADMIN — LEVALBUTEROL HYDROCHLORIDE 0.63 MG: 0.63 SOLUTION RESPIRATORY (INHALATION) at 07:04

## 2018-04-08 RX ADMIN — SODIUM CHLORIDE: 0.9 INJECTION, SOLUTION INTRAVENOUS at 06:04

## 2018-04-08 RX ADMIN — ESCITALOPRAM OXALATE 10 MG: 10 TABLET ORAL at 10:04

## 2018-04-08 NOTE — PLAN OF CARE
Problem: Patient Care Overview  Goal: Plan of Care Review  Outcome: Ongoing (interventions implemented as appropriate)  POC reviewed with patient who verbalized understanding. AAOX4. Pt O2 sats above 90% on 4L NC. Pt educated on the IS. Left FA Iv intact and patent. Left upper arm IV intact and patent. Keen intact and patent. Pt tolerating diet well. Pt worked well with therapy today. Pt sat in chair most of the day.  Pain being controlled with PCA. Pt remained free from falls throughout the shift. VSS. Will continue to monitor.

## 2018-04-08 NOTE — PT/OT/SLP EVAL
Physical Therapy Evaluation    Patient Name:  Rosamaria Aiken   MRN:  [id number]    Recommendations:     Discharge Recommendations:  nursing facility, skilled   Discharge Equipment Recommendations: none   Barriers to discharge: None    Assessment:     Rosamaria Aiken is a 77 y.o. female admitted with a medical diagnosis of Malignant neoplasm of body of pancreas.  She presents with the following impairments/functional limitations:  weakness, impaired endurance, impaired self care skills, impaired functional mobilty, pain, impaired balance, decreased lower extremity function, gait instability, decreased upper extremity function, impaired cardiopulmonary response to activity. Patient demonstrated good participation despite weakness and shaking. Level of assistance required min A - CGA. Able to tolerate gait training. Recommending [facility]. Skilled physical therapy is medically necessary to address the above impairments in order to return the patient back to their previous level of function.     Rehab Prognosis:  good; patient would benefit from acute skilled PT services to address these deficits and reach maximum level of function.      Recent Surgery: Procedure(s) (LRB):  PANCREATECTOMY-DISTAL (N/A)  SPLENECTOMY (N/A) 2 Days Post-Op    Plan:     During this hospitalization, patient to be seen 4 x/week to address the above listed problems via gait training, therapeutic activities, therapeutic exercises, neuromuscular re-education  · Plan of Care Expires:  05/08/18   Plan of Care Reviewed with: patient, family    Subjective     Communicated with nurse Fischer prior to session.  Patient found with HOB elevated, family present, and in NAD upon PT entry to room, agreeable to evaluation.      Chief Complaint: pain and shaking   Patient comments/goals: to return to PLOF  Pain/Comfort:  · Pain Addressed 2: Reposition, Distraction, Cessation of Activity    Patients cultural, spiritual, Jew conflicts given the current  situation: none stated    Living Environment:  Patient lives in a 2SH home 0STE to enter with her . She reported that she does not go to the 2nd floor and they use it for storage.   Prior to admission, patients level of function was independent and a community ambulator, but does not drive.  Patient has the following equipment: walker, rolling, rollator.  DME owned (not currently used): none.  Upon discharge, patient will have assistance from family.    Objective:     Patient found with: telemetry, peripheral IV     General Precautions: Standard, diabetic, fall   Orthopedic Precautions:N/A   Braces: N/A     Exams:  · Cognitive Exam:  Patient is oriented to Person, Place, Time and Situation and follows 100% of multistep commands   · Postural Exam:  Patient presented with the following abnormalities:    · -       Rounded shoulders  · -       Forward head  · Sensation:    · -       Intact  · Skin Integrity/Edema:      · -       Skin integrity: Visible skin intact  · -       Edema: None noted BLE/BUE  · RUE ROM: refer to OT note  · RUE Strength: refer to OT note  · LUE ROM: refer to OT note  · LUE Strength: refer to OT note  · RLE ROM: WFL  · RLE Strength: 4/5  · LLE ROM: wFL  · LLE Strength: 4/5   · Balance: good in sitting, poor in standing  · Rhomberg: positive    Functional Mobility:  · Bed Mobility:   Rolling to the left: Minimal Assistance   Supine to Sit: Minimal Assistance   Scooting at EOB: Minimal Assistance    · Sitting Balance at Edge of Bed:   Assistance Level Required: Stand-by Assistance   Time: 10 minutes   Postural deviations noted:    -       Rounded shoulders  -       Forward head  -       Kyphosis    · Transfers:   Sit to Stand: Contact Guard Assistance with No Assistive Device x 2 trials   Stand to Sit: Contact Guard Assistance with No Assistive Device x 2 trials   Bed to Chair: Stand Pivot with Contact Guard Assistance with No Assistive Device x 1 trial    · Gait:   Distance Ambulated: Pt  ambulated x10 feet in room. Pt demo significantly impaired balance with a posterior lean, and req VC/TCs for upright posture, forward trunk flexion and balance support of HHA.   Assistance level: Minimal Assistance   Assistive Device used:  No Assistive Device, HHA of therapist   Gait Pattern: 2-point gait   Gait Deviation(s): decreased germán, increased time in double stance, increased stride width and decreased weight-shifting ability   Impairments due to: balance dysfunction, weakness, and tremors of BLE and BUE    AM-PAC 6 CLICK MOBILITY  Total Score:17       Therapeutic Activities and Exercises:  PT arrived to patient's room to find patient resting quietly; agreeable to PT session. Patient performed mobility as above with non-skid socks in place.    Patient's blood glucose level was checked directly after therapy and was reported to be at 144.   · Patient Education:   · PT POC  · Gait training   · Importance of OOB activity and sitting upright in chair  · Balance training  · EOB activity:  · functional mobility testing   Bedside table in front of patient and area set up for function, convenience, and safety. RN aware of patient's mobility needs and status. Questions/concerns addressed within PT scope of practice; patient and family with no further questions.       Patient left up in chair with all lines intact, call button in reach, nurse Amado notified and family present.    GOALS:    Physical Therapy Goals        Problem: Physical Therapy Goal    Goal Priority Disciplines Outcome Goal Variances Interventions   Physical Therapy Goal     PT/OT, PT Ongoing (interventions implemented as appropriate)     Description:  Goals to be met by: 18    Patient will increase functional independence with mobility by performin. Supine to sit with Contact Guard Assistance  2. Sit to supine with Contact Guard Assistance  3. Gait  x >100 feet with Contact Guard Assistance using LRAD.   4. Lower extremity exercise  program x20 reps per handout, with supervision                      History:     Past Medical History:   Diagnosis Date    Diabetes     HTN (hypertension)     Pancreatic cancer        Past Surgical History:   Procedure Laterality Date    BLADDER SUSPENSION      HYSTERECTOMY         Clinical Decision Making:     History  Co-morbidities and personal factors that may impact the plan of care Examination  Body Structures and Functions, activity limitations and participation restrictions that may impact the plan of care Clinical Presentation   Decision Making/ Complexity Score   Co-morbidities:   [x] Time since onset of injury / illness / exacerbation  [x] Status of current condition  [x]Patient's cognitive status and safety concerns    [x] Multiple Medical Problems (see med hx)  Personal Factors:   [x] Patient's age  [x] Prior Level of function   [] Patient's home situation (environment and family support)  [] Patient's level of motivation  [] Expected progression of patient      HISTORY:(criteria)    [] 95091 - no personal factors/history    [] 22323 - has 1-2 personal factor/comorbidity     [x] 44002 - has >3 personal factor/comorbidity     Body Regions:  [x] Objective examination findings  [] Head     []  Neck  [x] Trunk   [x] Upper Extremity  [x] Lower Extremity    Body Systems:  [] For communication ability, affect, cognition, language, and learning style: the assessment of the ability to make needs known, consciousness, orientation (person, place, and time), expected emotional /behavioral responses, and learning preferences (eg, learning barriers, education  needs)  [x] For the neuromuscular system: a general assessment of gross coordinated movement (eg, balance, gait, locomotion, transfers, and transitions) and motor function  (motor control and motor learning)  [x] For the musculoskeletal system: the assessment of gross symmetry, gross range of motion, gross strength, height, and weight  [] For the  integumentary system: the assessment of pliability(texture), presence of scar formation, skin color, and skin integrity  [] For cardiovascular/pulmonary system: the assessment of heart rate, respiratory rate, blood pressure, and edema     Activity limitations:    [] Patient's cognitive status and saf ety concerns          [] Status of current condition      [] Weight bearing restriction  [] Cardiopulmunary Restriction    Participation Restrictions:   [] Goals and goal agreement with the patient     [] Rehab potential (prognosis) and probable outcome      Examination of Body System: (criteria)    [] 66792 - addressing 1-2 elements    [] 44338 - addressing a total of 3 or more elements     [] 55575 -  Addressing a total of 4 or more elements         Clinical Presentation: (criteria)  Choose one     On examination of body system using standardized tests and measures patient presents with (CHOOSE ONE) elements from any of the following: body structures and functions, activity limitations, and/or participation restrictions.  Leading to a clinical presentation that is considered (CHOOSE ONE)                              Clinical Decision Making  (Eval Complexity):  Choose One     Time Tracking:     PT Received On: 04/08/18  PT Start Time: 0821     PT Stop Time: 0851  PT Total Time (min): 30 min     Billable Minutes: Evaluation 15 and Gait Training 15    Angela Arana PT, DPT  4/8/2018  [phone]

## 2018-04-08 NOTE — PROGRESS NOTES
[facility]  General Surgery  Progress Note    Subjective:       Post-Op Info:  Procedure(s) (LRB):  PANCREATECTOMY-DISTAL (N/A)  SPLENECTOMY (N/A)   2 Days Post-Op     Interval History: POD2 from PANCREATECTOMY-DISTAL and SPLENECTOMY. NAEON.        Medications:  Continuous Infusions:   sodium chloride 0.9% 125 mL/hr at 04/08/18 0633    morphine       Scheduled Meds:   amLODIPine  10 mg Oral Daily    enoxaparin  40 mg Subcutaneous Daily    escitalopram oxalate  10 mg Oral Daily    levalbuterol  0.63 mg Nebulization Q8H WA    pantoprazole  40 mg Oral Daily    promethazine (PHENERGAN) IVPB  12.5 mg Intravenous Once     PRN Meds:diazePAM, naloxone     Review of patient's allergies indicates:  No Known Allergies  Objective:     Vital Signs (Most Recent):  Temp: 97.6 °F (36.4 °C) (04/08/18 0726)  Pulse: 78 (04/08/18 0741)  Resp: 18 (04/08/18 0741)  BP: (!) 148/65 (04/08/18 0726)  SpO2: (!) 86 % (04/08/18 0741) Vital Signs (24h Range):  Temp:  [97 °F (36.1 °C)-99.2 °F (37.3 °C)] 97.6 °F (36.4 °C)  Pulse:  [78-92] 78  Resp:  [14-22] 18  SpO2:  [86 %-93 %] 86 %  BP: (130-162)/(60-67) 148/65     Weight: 66 kg (145 lb 8.1 oz)  Body mass index is 24.21 kg/m².    Intake/Output - Last 3 Shifts       04/06 0700 - 04/07 0659 04/07 0700 - 04/08 0659 04/08 0700 - 04/09 0659    P.O.  120     I.V. (mL/kg) 4580 (69.4) 3202.4 (48.5)     IV Piggyback 100      Total Intake(mL/kg) 4680 (70.9) 3322.4 (50.3)     Urine (mL/kg/hr) 1415 (0.9) 1125 (0.7)     Stool 0 (0)      Blood 100 (0.1)      Total Output 1515 1125      Net +3165 +2197.4             Stool Occurrence 0 x            Physical Exam   Constitutional: She is oriented to person, place, and time. She appears well-nourished. No distress.   HENT:   Head: Normocephalic and atraumatic.   Right Ear: External ear normal.   Left Ear: External ear normal.   Eyes: Conjunctivae and EOM are normal. Pupils are equal, round, and reactive to light.   Neck: Normal range  of motion. Neck supple.   Cardiovascular: Normal rate and intact distal pulses.    Pulmonary/Chest: Effort normal. No stridor. No respiratory distress. She has no wheezes.   Abdominal: Soft. She exhibits no distension. There is no tenderness.   Neurological: She is alert and oriented to person, place, and time. No cranial nerve deficit.   Skin: Skin is warm and dry. Capillary refill takes less than 2 seconds. She is not diaphoretic. No erythema. No pallor.   Psychiatric: She has a normal mood and affect. Her behavior is normal. Judgment and thought content normal.   Incision c/d/i    Significant Labs:  CBC:     Recent Labs  Lab 04/08/18 0429   WBC 17.13*   RBC 3.04*   HGB 9.7*   HCT 30.7*      *   MCH 31.9*   MCHC 31.6*     BMP:     Recent Labs  Lab 04/08/18 0429   *      K 3.6   *   CO2 19*   BUN 22   CREATININE 0.7   CALCIUM 6.7*   MG 1.7     CMP:     Recent Labs  Lab 04/08/18 0429   *   CALCIUM 6.7*   ALBUMIN 2.2*   PROT 4.8*      K 3.6   CO2 19*   *   BUN 22   CREATININE 0.7   ALKPHOS 56   ALT 51*   AST 33   BILITOT 0.4     LFTs:     Recent Labs  Lab 04/08/18 0429   ALT 51*   AST 33   ALKPHOS 56   BILITOT 0.4   PROT 4.8*   ALBUMIN 2.2*       Significant Diagnostics:  I have reviewed all pertinent imaging results/findings within the past 24 hours.    Assessment/Plan:     * Malignant neoplasm of body of pancreas    77F with Carcinoma of the body of the pancreas now s/p Distal pancreatectomy and splenectomy (4/6/18)    - Continue home meds  - Nebs q8h while awake  - Continue PCA  - Diet: Sips with medications, Ice chips  - Needs to be oob and walking  - Please encourage IS            Nate barrera MD  General Surgery  [facility]

## 2018-04-08 NOTE — PLAN OF CARE
Problem: Occupational Therapy Goal  Goal: Occupational Therapy Goal  Goals to be met by: 4/22/18     Patient will increase functional independence with ADLs by performing:    UE Dressing with [place].  LE Dressing with Stand-by Assistance.  Grooming while standing at sink with Stand-by Assistance.  Toileting from toilet with Stand-by Assistance for hygiene and clothing management.   Sitting at edge of bed x10 minutes with Modified [place].  Supine to sit with Modified [place].  Toilet transfer to toilet with Stand-by Assistance.    Outcome: Ongoing (interventions implemented as appropriate)  Eval completed; goals established    Comments: Initiate OT CAROLANN Davalos OT  4/8/2018

## 2018-04-08 NOTE — ANESTHESIA POSTPROCEDURE EVALUATION
"Anesthesia Post Evaluation    Patient: Rosamaria Aiken    Procedure(s) Performed: Procedure(s) (LRB):  PANCREATECTOMY-DISTAL (N/A)  SPLENECTOMY (N/A)    Final Anesthesia Type: general  Patient location during evaluation: PACU  Patient participation: Yes- Able to Participate  Level of consciousness: awake and alert  Post-procedure vital signs: reviewed and stable  Pain management: adequate  Airway patency: patent  PONV status at discharge: No PONV  Anesthetic complications: no      Cardiovascular status: blood pressure returned to baseline  Respiratory status: unassisted  Hydration status: euvolemic  Follow-up not needed.        Visit Vitals  BP (!) 162/60 (BP Location: Left arm, Patient Position: Sitting)   Pulse 87   Temp 37.3 °C (99.2 °F) (Oral)   Resp 20   Ht 5' 5" (1.651 m)   Wt 66 kg (145 lb 8.1 oz)   SpO2 (!) 93%   Breastfeeding? No   BMI 24.21 kg/m²       Pain/Rocco Score: Pain Assessment Performed: Yes (4/8/2018  6:27 AM)  Presence of Pain: denies (4/8/2018  6:27 AM)  Pain Rating Prior to Med Admin: 0 (4/8/2018  6:29 AM)  Pain Rating Post Med Admin: 0 (4/7/2018 10:49 PM)      "

## 2018-04-08 NOTE — PT/OT/SLP EVAL
Occupational Therapy   Evaluation    Name: Rosamaria Aiken  MRN: [id number]  Admitting Diagnosis:  Malignant neoplasm of body of pancreas 2 Days Post-Op    Recommendations:     Discharge Recommendations: nursing facility, skilled (short stay)  Discharge Equipment Recommendations:  none  Barriers to discharge:  None    History:     Occupational Profile:  Living Environment: Pt lives with  in 2SH with 0STE. Bed/bath on 1st floor, pt reports they do not use 2nd floor. Home has walk-in tub with built-in seat, grab bars, and handheld shower head   Previous level of function: PTA, pt reports independence with all ADL and IADL, including driving. Daughter states pt has declined in functioning since illness began in Dec/Jan. Pt owns RW and rollator, but does not usually use them; use of RW began during functional decline. Pt reports recently attending OP PT for frozen shoulder and debility  Roles and Routines: wife, mother, grandmother  Equipment Owned:  walker, rolling, rollator  Assistance upon Discharge:  and daughter to assist as needed following d/c from hospital    Past Medical History:   Diagnosis Date    Diabetes     HTN (hypertension)     Pancreatic cancer        Past Surgical History:   Procedure Laterality Date    BLADDER SUSPENSION      HYSTERECTOMY         Subjective     Chief Complaint: shakiness  Patient/Family stated goals: improve mobiliy  Communicated with: RN prior to session.  Pain/Comfort:  · Pain Rating 1: 0/10  · Pain Addressed 1: Reposition, Distraction, Pre-medicate for activity  · Pain Rating Post-Intervention 1: 0/10    Patients cultural, spiritual, Jehovah's witness conflicts given the current situation: none reported    Objective:     Patient found with: telemetry, peripheral IV, oxygen, PCA, heard catheter    General Precautions: Standard, fall, diabetic   Orthopedic Precautions:N/A   Braces: N/A     Occupational Performance:    Bed Mobility:    · Patient completed Scooting/Bridging  with stand by assistance  · Patient completed Supine to Sit with minimum assistance and with side rail    Functional Mobility/Transfers:  · Patient completed Sit <> Stand Transfer with contact guard assistance with HHA; CGA/min A for standing balance to prevent posterior lean  · Patient completed Bed <> Chair Transfer using Step Transfer technique with contact guard assistance with HHA  · Functional Mobility: Pt ambulate 10 ft with min A for balance with HHA    Activities of Daily Living:  · UB Dressing: minimum assistance to don backward gown while seated EOB 2/2 lines  · LB Dressing: stand by assistance to manage B socks while seated EOB    Cognitive/Visual Perceptual:  Cognitive/Psychosocial Skills:     -       Oriented to: Person, Place, Time and Situation   -       Follows Commands/attention:Follows multistep  commands  -       Communication: clear/fluent  -       Memory: No Deficits noted  -       Safety awareness/insight to disability: intact   -       Mood/Affect/Coping skills/emotional control: Appropriate to situation  Visual/Perceptual:      -Intact    Physical Exam:  Balance:    -       good sitting balance; poor static/dynamic standing balance requring min A to prevent posterior lean  Postural examination/scapula alignment:    -       No postural abnormalities identified  Skin integrity: Visible skin intact  Edema:  Mild BLE  Sensation:    -       Intact  Dominant hand:    -       R hand  Upper Extremity Range of Motion:     -       Right Upper Extremity: shoulder AROM to 100 and IR to 30 2/2 pt report of frozen shoulder; elbow ROM is WFL  -       Left Upper Extremity: WFL  Upper Extremity Strength:    -       Right Upper Extremity: WFL  -       Left Upper Extremity: WFL   Strength:    -       Right Upper Extremity: WFL  -       Left Upper Extremity: WFL  Fine Motor Coordination:    -       Intact  Gross motor coordination:   WFL    Patient left up in chair with all lines intact, call button in  "sherri, RN notified and , daughter present    [facility] 6 Click:  [facility] Total Score: 20    Treatment & Education:  Pt educated on role of OT/POC  White board/communication board updated  Education:    Assessment:     Rosamaria Aiken is a 77 y.o. female with a medical diagnosis of Malignant neoplasm of body of pancreas.  She presents with balance deficits and weakness as limiting factors in safe functional mobility and self care .  Performance deficits affecting function are weakness, impaired endurance, impaired functional mobilty, impaired self care skills, gait instability, impaired balance, decreased coordination, decreased safety awareness, decreased ROM, pain, decreased upper extremity function.      Rehab Prognosis:  Good; patient would benefit from acute skilled OT services to address these deficits and reach maximum level of function.         Clinical Decision Makin.  OT Mod:  "Pt evaluation falls under moderate complexity for evaluation coding due to identification of 3-5 performance deficits noted as stated above. Eval required Min/Mod assistance to complete on this date and detailed assessment(s) were utilized. Moreover, an expanded review of history and occupational profile obtained with additional review of cognitive, physical and psychosocial hx."     Plan:     Patient to be seen 4 x/week to address the above listed problems via self-care/home management, therapeutic activities, therapeutic exercises  · Plan of Care Expires: 18  · Plan of Care Reviewed with: patient, spouse, daughter    This Plan of care has been discussed with the patient who was involved in its development and understands and is in agreement with the identified goals and treatment plan    GOALS:    Occupational Therapy Goals        Problem: Occupational Therapy Goal    Goal Priority Disciplines Outcome Interventions   Occupational Therapy Goal     OT, PT/OT Ongoing (interventions implemented as appropriate)  "   Description:  Goals to be met by: 4/22/18     Patient will increase functional independence with ADLs by performing:    UE Dressing with [place].  LE Dressing with Stand-by Assistance.  Grooming while standing at sink with Stand-by Assistance.  Toileting from toilet with Stand-by Assistance for hygiene and clothing management.   Sitting at edge of bed x10 minutes with Modified [place].  Supine to sit with Modified [place].  Toilet transfer to toilet with Stand-by Assistance.                      Time Tracking:     OT Date of Treatment: 04/08/18  OT Start Time: 0823  OT Stop Time: 0851  OT Total Time (min): 28 min    Billable Minutes:Evaluation 20  Self Care/Home Management 8    Mia Davalos OT  4/8/2018

## 2018-04-08 NOTE — SUBJECTIVE & OBJECTIVE
Interval History: POD3 from PANCREATECTOMY-DISTAL and SPLENECTOMY. NAEON.        Medications:  Continuous Infusions:   sodium chloride 0.9% 125 mL/hr at 04/08/18 0633    morphine       Scheduled Meds:   amLODIPine  10 mg Oral Daily    enoxaparin  40 mg Subcutaneous Daily    escitalopram oxalate  10 mg Oral Daily    levalbuterol  0.63 mg Nebulization Q8H WA    pantoprazole  40 mg Oral Daily    promethazine (PHENERGAN) IVPB  12.5 mg Intravenous Once     PRN Meds:diazePAM, naloxone     Review of patient's allergies indicates:  No Known Allergies  Objective:     Vital Signs (Most Recent):  Temp: 97.6 °F (36.4 °C) (04/08/18 0726)  Pulse: 78 (04/08/18 0741)  Resp: 18 (04/08/18 0741)  BP: (!) 148/65 (04/08/18 0726)  SpO2: (!) 86 % (04/08/18 0741) Vital Signs (24h Range):  Temp:  [97 °F (36.1 °C)-99.2 °F (37.3 °C)] 97.6 °F (36.4 °C)  Pulse:  [78-92] 78  Resp:  [14-22] 18  SpO2:  [86 %-93 %] 86 %  BP: (130-162)/(60-67) 148/65     Weight: 66 kg (145 lb 8.1 oz)  Body mass index is 24.21 kg/m².    Intake/Output - Last 3 Shifts       04/06 0700 - 04/07 0659 04/07 0700 - 04/08 0659 04/08 0700 - 04/09 0659    P.O.  120     I.V. (mL/kg) 4580 (69.4) 3202.4 (48.5)     IV Piggyback 100      Total Intake(mL/kg) 4680 (70.9) 3322.4 (50.3)     Urine (mL/kg/hr) 1415 (0.9) 1125 (0.7)     Stool 0 (0)      Blood 100 (0.1)      Total Output 1515 1125      Net +3165 +2197.4             Stool Occurrence 0 x            Physical Exam   Constitutional: She is oriented to person, place, and time. She appears well-nourished. No distress.   HENT:   Head: Normocephalic and atraumatic.   Right Ear: External ear normal.   Left Ear: External ear normal.   Eyes: Conjunctivae and EOM are normal. Pupils are equal, round, and reactive to light.   Neck: Normal range of motion. Neck supple.   Cardiovascular: Normal rate and intact distal pulses.    Pulmonary/Chest: Effort normal. No stridor. No respiratory distress. She has no wheezes.   Abdominal:  Soft. She exhibits no distension. There is no tenderness.   Neurological: She is alert and oriented to person, place, and time. No cranial nerve deficit.   Skin: Skin is warm and dry. Capillary refill takes less than 2 seconds. She is not diaphoretic. No erythema. No pallor.   Psychiatric: She has a normal mood and affect. Her behavior is normal. Judgment and thought content normal.   Incision c/d/i    Significant Labs:  CBC:     Recent Labs  Lab 04/08/18 0429   WBC 17.13*   RBC 3.04*   HGB 9.7*   HCT 30.7*      *   MCH 31.9*   MCHC 31.6*     BMP:     Recent Labs  Lab 04/08/18 0429   *      K 3.6   *   CO2 19*   BUN 22   CREATININE 0.7   CALCIUM 6.7*   MG 1.7     CMP:     Recent Labs  Lab 04/08/18 0429   *   CALCIUM 6.7*   ALBUMIN 2.2*   PROT 4.8*      K 3.6   CO2 19*   *   BUN 22   CREATININE 0.7   ALKPHOS 56   ALT 51*   AST 33   BILITOT 0.4     LFTs:     Recent Labs  Lab 04/08/18 0429   ALT 51*   AST 33   ALKPHOS 56   BILITOT 0.4   PROT 4.8*   ALBUMIN 2.2*       Significant Diagnostics:  I have reviewed all pertinent imaging results/findings within the past 24 hours.

## 2018-04-08 NOTE — PLAN OF CARE
Problem: Physical Therapy Goal  Goal: Physical Therapy Goal  Goals to be met by: 18    Patient will increase functional independence with mobility by performin. Supine to sit with Contact Guard Assistance  2. Sit to supine with Contact Guard Assistance  3. Gait  x >100 feet with Contact Guard Assistance using LRAD.   4. Lower extremity exercise program x20 reps per handout, with supervision    Outcome: Ongoing (interventions implemented as appropriate)  PT evaluation complete. Recommending [facility] SNF. Please see full PT evaluation note for details.     Angela Arana PT, DPT  2018  [phone]

## 2018-04-08 NOTE — PLAN OF CARE
Problem: Patient Care Overview  Goal: Plan of Care Review  Outcome: Ongoing (interventions implemented as appropriate)  VS stable. SpO2=90-93% on 3L NC, no c/o of SOB. Telemetry= NSR 80s. Pain controlled with PCA pump. Keen catheter was place at 2200 last night, clear, yellow output. MD Mark was notified this morning for Ca=6.7. Patient is calm. Sleeping, no distress noted. Will continue to monitor.

## 2018-04-09 LAB
ALBUMIN SERPL BCP-MCNC: 2.4 G/DL
ALP SERPL-CCNC: 76 U/L
ALT SERPL W/O P-5'-P-CCNC: 43 U/L
ANION GAP SERPL CALC-SCNC: 10 MMOL/L
AST SERPL-CCNC: 24 U/L
BACTERIA #/AREA URNS AUTO: ABNORMAL /HPF
BASOPHILS # BLD AUTO: 0.03 K/UL
BASOPHILS NFR BLD: 0.2 %
BILIRUB SERPL-MCNC: 0.6 MG/DL
BILIRUB UR QL STRIP: NEGATIVE
BUN SERPL-MCNC: 18 MG/DL
CALCIUM SERPL-MCNC: 8.7 MG/DL
CHLORIDE SERPL-SCNC: 108 MMOL/L
CLARITY UR REFRACT.AUTO: ABNORMAL
CO2 SERPL-SCNC: 19 MMOL/L
COLOR UR AUTO: YELLOW
CREAT SERPL-MCNC: 0.7 MG/DL
DIFFERENTIAL METHOD: ABNORMAL
EOSINOPHIL # BLD AUTO: 0.5 K/UL
EOSINOPHIL NFR BLD: 3.3 %
ERYTHROCYTE [DISTWIDTH] IN BLOOD BY AUTOMATED COUNT: 13.2 %
EST. GFR  (AFRICAN AMERICAN): >60 ML/MIN/1.73 M^2
EST. GFR  (NON AFRICAN AMERICAN): >60 ML/MIN/1.73 M^2
GLUCOSE SERPL-MCNC: 120 MG/DL
GLUCOSE UR QL STRIP: NEGATIVE
HCT VFR BLD AUTO: 33.9 %
HGB BLD-MCNC: 11.1 G/DL
HGB UR QL STRIP: NEGATIVE
HYALINE CASTS UR QL AUTO: 1 /LPF
IMM GRANULOCYTES # BLD AUTO: 0.09 K/UL
IMM GRANULOCYTES NFR BLD AUTO: 0.6 %
KETONES UR QL STRIP: NEGATIVE
LEUKOCYTE ESTERASE UR QL STRIP: ABNORMAL
LYMPHOCYTES # BLD AUTO: 1.1 K/UL
LYMPHOCYTES NFR BLD: 6.8 %
MAGNESIUM SERPL-MCNC: 2 MG/DL
MCH RBC QN AUTO: 31.7 PG
MCHC RBC AUTO-ENTMCNC: 32.7 G/DL
MCV RBC AUTO: 97 FL
MICROSCOPIC COMMENT: ABNORMAL
MONOCYTES # BLD AUTO: 1 K/UL
MONOCYTES NFR BLD: 6.4 %
NEUTROPHILS # BLD AUTO: 12.8 K/UL
NEUTROPHILS NFR BLD: 82.7 %
NITRITE UR QL STRIP: NEGATIVE
NRBC BLD-RTO: 0 /100 WBC
PH UR STRIP: 8 [PH] (ref 5–8)
PHOSPHATE SERPL-MCNC: 1.6 MG/DL
PLATELET # BLD AUTO: 259 K/UL
PMV BLD AUTO: 10.3 FL
POTASSIUM SERPL-SCNC: 3.6 MMOL/L
PROT SERPL-MCNC: 5.6 G/DL
PROT UR QL STRIP: ABNORMAL
RBC # BLD AUTO: 3.5 M/UL
RBC #/AREA URNS AUTO: 5 /HPF (ref 0–4)
SODIUM SERPL-SCNC: 137 MMOL/L
SP GR UR STRIP: 1.01 (ref 1–1.03)
URN SPEC COLLECT METH UR: ABNORMAL
UROBILINOGEN UR STRIP-ACNC: NEGATIVE EU/DL
WBC # BLD AUTO: 15.51 K/UL
WBC #/AREA URNS AUTO: 39 /HPF (ref 0–5)

## 2018-04-09 PROCEDURE — 36415 COLL VENOUS BLD VENIPUNCTURE: CPT

## 2018-04-09 PROCEDURE — 97116 GAIT TRAINING THERAPY: CPT

## 2018-04-09 PROCEDURE — 25000003 PHARM REV CODE 250: Performed by: SURGERY

## 2018-04-09 PROCEDURE — 97110 THERAPEUTIC EXERCISES: CPT

## 2018-04-09 PROCEDURE — 80053 COMPREHEN METABOLIC PANEL: CPT

## 2018-04-09 PROCEDURE — 86580 TB INTRADERMAL TEST: CPT | Performed by: NURSE PRACTITIONER

## 2018-04-09 PROCEDURE — 63600175 PHARM REV CODE 636 W HCPCS: Performed by: SURGERY

## 2018-04-09 PROCEDURE — 87040 BLOOD CULTURE FOR BACTERIA: CPT | Mod: 59

## 2018-04-09 PROCEDURE — 81001 URINALYSIS AUTO W/SCOPE: CPT

## 2018-04-09 PROCEDURE — 85025 COMPLETE CBC W/AUTO DIFF WBC: CPT

## 2018-04-09 PROCEDURE — 94761 N-INVAS EAR/PLS OXIMETRY MLT: CPT

## 2018-04-09 PROCEDURE — 97530 THERAPEUTIC ACTIVITIES: CPT

## 2018-04-09 PROCEDURE — 25000003 PHARM REV CODE 250: Performed by: STUDENT IN AN ORGANIZED HEALTH CARE EDUCATION/TRAINING PROGRAM

## 2018-04-09 PROCEDURE — 83735 ASSAY OF MAGNESIUM: CPT

## 2018-04-09 PROCEDURE — 25000003 PHARM REV CODE 250: Performed by: NURSE PRACTITIONER

## 2018-04-09 PROCEDURE — 84100 ASSAY OF PHOSPHORUS: CPT

## 2018-04-09 PROCEDURE — 63600175 PHARM REV CODE 636 W HCPCS: Performed by: NURSE PRACTITIONER

## 2018-04-09 PROCEDURE — 20600001 HC STEP DOWN PRIVATE ROOM

## 2018-04-09 RX ORDER — LEVALBUTEROL INHALATION SOLUTION 0.63 MG/3ML
SOLUTION RESPIRATORY (INHALATION)
Status: DISCONTINUED
Start: 2018-04-09 | End: 2018-04-09 | Stop reason: WASHOUT

## 2018-04-09 RX ORDER — KETOROLAC TROMETHAMINE 30 MG/ML
30 INJECTION, SOLUTION INTRAMUSCULAR; INTRAVENOUS EVERY 8 HOURS
Status: COMPLETED | OUTPATIENT
Start: 2018-04-09 | End: 2018-04-09

## 2018-04-09 RX ORDER — OXYCODONE HYDROCHLORIDE 5 MG/1
5 TABLET ORAL ONCE
Status: COMPLETED | OUTPATIENT
Start: 2018-04-09 | End: 2018-04-09

## 2018-04-09 RX ORDER — OXYCODONE AND ACETAMINOPHEN 5; 325 MG/1; MG/1
1 TABLET ORAL EVERY 4 HOURS PRN
Status: DISCONTINUED | OUTPATIENT
Start: 2018-04-09 | End: 2018-04-11 | Stop reason: HOSPADM

## 2018-04-09 RX ORDER — HYDRALAZINE HYDROCHLORIDE 20 MG/ML
10 INJECTION INTRAMUSCULAR; INTRAVENOUS EVERY 8 HOURS PRN
Status: DISCONTINUED | OUTPATIENT
Start: 2018-04-09 | End: 2018-04-11 | Stop reason: HOSPADM

## 2018-04-09 RX ORDER — OXYCODONE AND ACETAMINOPHEN 10; 325 MG/1; MG/1
1 TABLET ORAL EVERY 4 HOURS PRN
Status: DISCONTINUED | OUTPATIENT
Start: 2018-04-09 | End: 2018-04-11 | Stop reason: HOSPADM

## 2018-04-09 RX ORDER — OXYBUTYNIN CHLORIDE 10 MG/1
10 TABLET, EXTENDED RELEASE ORAL DAILY
Status: DISCONTINUED | OUTPATIENT
Start: 2018-04-09 | End: 2018-04-11 | Stop reason: HOSPADM

## 2018-04-09 RX ORDER — POTASSIUM CHLORIDE 20 MEQ/1
20 TABLET, EXTENDED RELEASE ORAL ONCE
Status: COMPLETED | OUTPATIENT
Start: 2018-04-09 | End: 2018-04-09

## 2018-04-09 RX ORDER — HYDROMORPHONE HYDROCHLORIDE 1 MG/ML
1 INJECTION, SOLUTION INTRAMUSCULAR; INTRAVENOUS; SUBCUTANEOUS EVERY 4 HOURS PRN
Status: DISCONTINUED | OUTPATIENT
Start: 2018-04-09 | End: 2018-04-11 | Stop reason: HOSPADM

## 2018-04-09 RX ADMIN — KETOROLAC TROMETHAMINE 30 MG: 30 INJECTION, SOLUTION INTRAMUSCULAR; INTRAVENOUS at 10:04

## 2018-04-09 RX ADMIN — OXYCODONE HYDROCHLORIDE AND ACETAMINOPHEN 1 TABLET: 10; 325 TABLET ORAL at 10:04

## 2018-04-09 RX ADMIN — OXYBUTYNIN CHLORIDE 10 MG: 10 TABLET, FILM COATED, EXTENDED RELEASE ORAL at 01:04

## 2018-04-09 RX ADMIN — DIAZEPAM 5 MG: 5 TABLET ORAL at 02:04

## 2018-04-09 RX ADMIN — ESCITALOPRAM OXALATE 10 MG: 10 TABLET ORAL at 10:04

## 2018-04-09 RX ADMIN — AMLODIPINE BESYLATE 10 MG: 10 TABLET ORAL at 10:04

## 2018-04-09 RX ADMIN — OXYCODONE HYDROCHLORIDE AND ACETAMINOPHEN 1 TABLET: 10; 325 TABLET ORAL at 02:04

## 2018-04-09 RX ADMIN — ENOXAPARIN SODIUM 40 MG: 100 INJECTION SUBCUTANEOUS at 10:04

## 2018-04-09 RX ADMIN — POTASSIUM PHOSPHATE, MONOBASIC AND POTASSIUM PHOSPHATE, DIBASIC 30 MMOL: 224; 236 INJECTION, SOLUTION, CONCENTRATE INTRAVENOUS at 10:04

## 2018-04-09 RX ADMIN — KETOROLAC TROMETHAMINE 30 MG: 30 INJECTION, SOLUTION INTRAMUSCULAR; INTRAVENOUS at 04:04

## 2018-04-09 RX ADMIN — HYDRALAZINE HYDROCHLORIDE 10 MG: 20 INJECTION INTRAMUSCULAR; INTRAVENOUS at 01:04

## 2018-04-09 RX ADMIN — HYDROMORPHONE HYDROCHLORIDE 1 MG: 1 INJECTION, SOLUTION INTRAMUSCULAR; INTRAVENOUS; SUBCUTANEOUS at 04:04

## 2018-04-09 RX ADMIN — PANTOPRAZOLE SODIUM 40 MG: 40 TABLET, DELAYED RELEASE ORAL at 10:04

## 2018-04-09 RX ADMIN — Medication 5 UNITS: at 10:04

## 2018-04-09 RX ADMIN — SODIUM CHLORIDE: 0.9 INJECTION, SOLUTION INTRAVENOUS at 10:04

## 2018-04-09 RX ADMIN — POTASSIUM CHLORIDE 20 MEQ: 1500 TABLET, EXTENDED RELEASE ORAL at 10:04

## 2018-04-09 RX ADMIN — OXYCODONE HYDROCHLORIDE 5 MG: 5 TABLET ORAL at 01:04

## 2018-04-09 NOTE — PLAN OF CARE
Problem: Patient Care Overview  Goal: Plan of Care Review  Outcome: Ongoing (interventions implemented as appropriate)  VS stable. BP elevated this morning, MD aware. Possible due to pain. Pt. Will receive BP meds this morning. SpO2=92% on 4L NC. Encouraged IS. Keen catheter with good urine output. Keen was d/c'd at approx. 0650 this AM. Telemetry= NSR 80s . Will continue to monitor.

## 2018-04-09 NOTE — PLAN OF CARE
SW learned in morning huddle that Pt is anticipated to discharge either Wednesday or Thursday of this week and will require SNF, per therapy recommendations. SW went to meet with Pt to discuss this recommendation. Pt is in agreement with going to SNF. SW provided SNF list from the [facility] website. Pt would like to be referred to the following SNFs: 1) [facility] 2) [facility] 3) [facility]. SW asked SSC to please initiate referrals to these facilities in [facility]. SW to continue to follow.     Racheal Osei, ARNALDO

## 2018-04-09 NOTE — PLAN OF CARE
Problem: Physical Therapy Goal  Goal: Physical Therapy Goal  Goals to be met by: 18    Patient will increase functional independence with mobility by performin. Supine to sit with Contact Guard Assistance  2. Sit to supine with Contact Guard Assistance  3. Gait  x >100 feet with Contact Guard Assistance using LRAD.   4. Lower extremity exercise program x20 reps per handout, with supervision     Pt progressing towards goals. continue with PT POC.Goals remain appropriate.  Lamonte Najera PTA  2018

## 2018-04-09 NOTE — SUBJECTIVE & OBJECTIVE
Interval History: No acute events overnight. Pain well controlled on PCA. Tolerating full liquid diet, no nausea/vomiting- belching, not passing gas. Keen in place, UOP adequate.    Medications:  Continuous Infusions:   sodium chloride 0.9% 50 mL/hr at 04/08/18 1047    morphine       Scheduled Meds:   amLODIPine  10 mg Oral Daily    enoxaparin  40 mg Subcutaneous Daily    escitalopram oxalate  10 mg Oral Daily    levalbuterol  0.63 mg Nebulization Q8H WA    pantoprazole  40 mg Oral Daily    promethazine (PHENERGAN) IVPB  12.5 mg Intravenous Once     PRN Meds:diazePAM, naloxone     Review of patient's allergies indicates:  No Known Allergies  Objective:     Vital Signs (Most Recent):  Temp: 98.7 °F (37.1 °C) (04/09/18 0418)  Pulse: 88 (04/09/18 0418)  Resp: 18 (04/09/18 0418)  BP: (!) 161/70 (notified nurse) (04/09/18 0418)  SpO2: (!) 92 % (04/09/18 0418) Vital Signs (24h Range):  Temp:  [97.6 °F (36.4 °C)-99.2 °F (37.3 °C)] 98.7 °F (37.1 °C)  Pulse:  [73-98] 88  Resp:  [15-20] 18  SpO2:  [86 %-93 %] 92 %  BP: (140-162)/(65-70) 161/70     Weight: 66 kg (145 lb 8.1 oz)  Body mass index is 24.21 kg/m².    Intake/Output - Last 3 Shifts       04/07 0700 - 04/08 0659 04/08 0700 - 04/09 0659    P.O. 120 600    I.V. (mL/kg) 3202.4 (48.5) 1134.3 (17.2)    Total Intake(mL/kg) 3322.4 (50.3) 1734.3 (26.3)    Urine (mL/kg/hr) 1125 (0.7) 475 (0.3)    Total Output 1125 475    Net +2197.4 +1259.3                Physical Exam   Constitutional: She is oriented to person, place, and time. She appears well-developed and well-nourished.   HENT:   Head: Normocephalic and atraumatic.   Cardiovascular: Normal rate.    Pulmonary/Chest: Effort normal.   Abdominal: Soft. She exhibits no distension. There is tenderness (Incisional tenderness, appropriate).   Midline incision, clean, dry and intact with dermabond in place.   Neurological: She is alert and oriented to person, place, and time.   Skin: Skin is warm and dry.   Nursing note  and vitals reviewed.      Significant Labs:  CBC:   Recent Labs  Lab 04/08/18  1241   WBC 18.11*   RBC 3.31*   HGB 10.5*   HCT 32.9*      MCV 99*   MCH 31.7*   MCHC 31.9*     CMP:   Recent Labs  Lab 04/08/18  0429   *   CALCIUM 6.7*   ALBUMIN 2.2*   PROT 4.8*      K 3.6   CO2 19*   *   BUN 22   CREATININE 0.7   ALKPHOS 56   ALT 51*   AST 33   BILITOT 0.4       Significant Diagnostics:  I have reviewed all pertinent imaging results/findings within the past 24 hours.

## 2018-04-09 NOTE — PLAN OF CARE
Referral sent to     1) [facility] [phone]  [facility]) [facility] [phone]  [facility]

## 2018-04-09 NOTE — PLAN OF CARE
Problem: Occupational Therapy Goal  Goal: Occupational Therapy Goal  Goals to be met by: 4/22/18     Patient will increase functional independence with ADLs by performing:    UE Dressing with [place].  LE Dressing with Stand-by Assistance.  Grooming while standing at sink with Stand-by Assistance.  Toileting from toilet with Stand-by Assistance for hygiene and clothing management.   Sitting at edge of bed x10 minutes with Modified [place].  Supine to sit with Modified [place].  Toilet transfer to toilet with Stand-by Assistance.     Outcome: Ongoing (interventions implemented as appropriate)  Increased SOB. AYLEEN Mata  4/9/2018

## 2018-04-09 NOTE — PLAN OF CARE
Called [facility] & spoke to Amanda. Received some pages. Pooja to send updated noted and will F/U with Racheal.    Called [facility] & spoke to Mackenzie. Received referral & will F/U with Racheal

## 2018-04-09 NOTE — PLAN OF CARE
Patient is a 77 year old female admitted from home 4/6/2018 and underwent Distal Pancreatectomy and Splenectomy.  Patient now expected to discharge to SNF +/- 4/11/2018.    Patient lives in a two story home with her bed and bathroom on the first floor with her , Efren, who will drive her home, care for her and obtain anything she needs after discharge along with daughters.  Patient with PT/Ot recs for SNF due to debility postop.  Patient given [facility] with understanding verbalized.  Will continue to follow for needs.    PCP  Tyrone Lovett MD  [address] [place] [id number] [phone]      [facility] - [address]  [address]  [place] [address]  Phone: [phone] Fax: [phone]      Extended Emergency Contact Information  Primary Emergency Contact: AttilaEfren  Address: [facility] [place] [address] [address] [facility]  Home Phone: [phone]  Relation: Spouse  Secondary Emergency Contact: Noelle Aiken   [place]  Mobile Phone: [phone]  Relation: Daughter       04/09/18 1249   Discharge Assessment   Assessment Type Discharge Planning Assessment   Confirmed/corrected address and phone number on facesheet? Yes   Assessment information obtained from? Medical Record   Expected Length of Stay (days) 5   Communicated expected length of stay with patient/caregiver yes   Prior to hospitilization cognitive status: Alert/Oriented   Prior to hospitalization functional status: Independent   Current cognitive status: Alert/Oriented   Current Functional Status: Independent;Assistive Equipment;Needs Assistance   Lives With spouse   Able to Return to Prior Arrangements yes   Is patient able to care for self after discharge? Yes   Who are your caregiver(s) and their phone number(s)? family   Patient's perception of discharge disposition home or selfcare   Equipment Currently Used at Home xiomara;walker,  rolling   Do you have any problems affording any of your prescribed medications? No   Is the patient taking medications as prescribed? yes   Does the patient have transportation home? Yes   Transportation Available family or friend will provide   Discharge Plan A Skilled Nursing Facility   Discharge Plan B Home with family;Home Health   Patient/Family In Agreement With Plan yes

## 2018-04-09 NOTE — PT/OT/SLP PROGRESS
Physical Therapy Treatment    Patient Name:  Rosamaria Aiken   MRN:  [id number]    Recommendations:     Discharge Recommendations:  nursing facility, skilled   Discharge Equipment Recommendations:  (TBD)   Barriers to discharge: None    Assessment:     Rosamaria Aiken is a 77 y.o. female admitted with a medical diagnosis of Malignant neoplasm of body of pancreas.  She presents with the following impairments/functional limitations:  weakness, impaired endurance, impaired self care skills, gait instability, impaired functional mobilty, impaired balance, decreased lower extremity function, decreased upper extremity function, impaired skin, edema . Pt Progressing with PT Intervention. Pt Progressing with improving gait distance. Pt would continue to benefit from skilled PT to address overall functional mobility and goals. Goals remain appropriate      Rehab Prognosis:  good; patient would benefit from acute skilled PT services to address these deficits and reach maximum level of function.      Recent Surgery: Procedure(s) (LRB):  PANCREATECTOMY-DISTAL (N/A)  SPLENECTOMY (N/A) 3 Days Post-Op    Plan:     During this hospitalization, patient to be seen 4 x/week to address the above listed problems via gait training, therapeutic activities, therapeutic exercises, neuromuscular re-education  · Plan of Care Expires:  05/08/18   Plan of Care Reviewed with: patient    Subjective     Communicated with RN prior to session.  Patient found Supine upon PT entry to room, agreeable to treatment.      Chief Complaint: I don't know if I can do much  Pain/Comfort:  · Pain Rating 1: 0/10  · Pain Rating Post-Intervention 1: 0/10    Patients cultural, spiritual, Gnosticist conflicts given the current situation: none noted    Objective:     Patient found with: peripheral IV, telemetry, oxygen     General Precautions: Standard, fall, diabetic   Orthopedic Precautions:N/A   Braces: N/A     Functional Mobility:  · Bed Mobility:     · Rolling  Left:  minimum assistance  · Scooting: contact guard assistance  · Supine to Sit: minimum assistance  · Transfers:     · Sit to Stand:  contact guard assistance with rolling walker  · Gait: pt ambulated 60 ft with RW with CGA for safety. vcs for upright posture and safety      AM-PAC 6 CLICK MOBILITY  Turning over in bed (including adjusting bedclothes, sheets and blankets)?: 3  Sitting down on and standing up from a chair with arms (e.g., wheelchair, bedside commode, etc.): 3  Moving from lying on back to sitting on the side of the bed?: 3  Moving to and from a bed to a chair (including a wheelchair)?: 3  Need to walk in hospital room?: 3  Climbing 3-5 steps with a railing?: 2  Total Score: 17       Therapeutic Activities and Exercises:   Discussed/educated patient on progress, safety,  d/c, PT POC   Patient performed therex X 10-15 reps seated in bedside chair B LE AROM AP, LAQ, Hip Flexion, Hip Abd/Add   White board updated   Donned an extra gown   Bedside table in front of patient and area set up for function, convenience, and safety. RN aware of patient's mobility needs and status. Questions/concerns addressed within PT scope of practice; patient and family with no further questions.       Patient left up in chair with all lines intact, call button in reach and nsg notified..    GOALS:    Physical Therapy Goals        Problem: Physical Therapy Goal    Goal Priority Disciplines Outcome Goal Variances Interventions   Physical Therapy Goal     PT/OT, PT Ongoing (interventions implemented as appropriate)     Description:  Goals to be met by: 18    Patient will increase functional independence with mobility by performin. Supine to sit with Contact Guard Assistance  2. Sit to supine with Contact Guard Assistance  3. Gait  x >100 feet with Contact Guard Assistance using LRAD.   4. Lower extremity exercise program x20 reps per handout, with supervision                      Time Tracking:     PT Received On:  04/09/18  PT Total Time (min):   38 mins    Billable Minutes: Gait Training 15, Therapeutic Activity 13 and Therapeutic Exercise 10    Treatment Type: Treatment  PT/PTA: PTA     PTA Visit Number: 1     Lamonte Najera PTA  04/09/2018

## 2018-04-09 NOTE — PT/OT/SLP PROGRESS
Occupational Therapy   Treatment    Name: Rosamaria Aiken  MRN: [id number]  Admitting Diagnosis:  Malignant neoplasm of body of pancreas  3 Days Post-Op    Recommendations:     Discharge Recommendations: nursing facility, skilled  Discharge Equipment Recommendations:   (TBD)  Barriers to discharge:  None    Subjective     Communicated with: nurse prior to session.  Pain/Comfort:  · Pain Rating 1: 0/10  · Pain Rating Post-Intervention 1: 0/10    Patients cultural, spiritual, Worship conflicts given the current situation: none reported    Objective:     Patient found with: peripheral IV, telemetry, oxygen (supine in bed with spouse present)    General Precautions: Standard, fall, diabetic   Orthopedic Precautions:N/A   Braces: N/A     Occupational Performance:  Pt performing spirometer upon entry    Bed Mobility:    · Pt refused OOB in AM and PM attempts as pt just returned back to bed. Pt agreed to LBD supine and UE there ex     Activities of Daily Living:  · LB Dressing: supervision to doff and raz socks in supine--removed SCD and adjusted EDWARDO hose to decrease skin and edema issues    Patient left HOB elevated with all lines intact and call button in reach    [facility] 6 Click:  [facility] Total Score: 20    Treatment & Education:  Pt performed BUE AROM exercises supine x 10 reps through all available planes  Educated pt re: importance of OOB to increase endurance, strength and mobility  Pt with increased SOB after returning from restroom which she reported she toileted independently  Education:    Assessment:     Rosamaria Aiken is a 77 y.o. female with a medical diagnosis of Malignant neoplasm of body of pancreas.  Pt with decreased tolerance this date;however, demonstrated good flexibiltiy with LE to perform self care.   Performance deficits affecting function are weakness, impaired endurance, impaired self care skills, impaired functional mobilty, impaired balance, gait instability, decreased upper extremity function,  decreased lower extremity function, edema, impaired skin.      Rehab Prognosis:  Good ; patient would benefit from acute skilled OT services to address these deficits and reach maximum level of function.       Plan:     Patient to be seen 4 x/week to address the above listed problems via self-care/home management, therapeutic activities, therapeutic exercises  · Plan of Care Expires: 05/08/18  · Plan of Care Reviewed with: patient    This Plan of care has been discussed with the patient who was involved in its development and understands and is in agreement with the identified goals and treatment plan    GOALS:    Occupational Therapy Goals        Problem: Occupational Therapy Goal    Goal Priority Disciplines Outcome Interventions   Occupational Therapy Goal     OT, PT/OT Ongoing (interventions implemented as appropriate)    Description:  Goals to be met by: 4/22/18     Patient will increase functional independence with ADLs by performing:    UE Dressing with [place].  LE Dressing with Stand-by Assistance.  Grooming while standing at sink with Stand-by Assistance.  Toileting from toilet with Stand-by Assistance for hygiene and clothing management.   Sitting at edge of bed x10 minutes with Modified [place].  Supine to sit with Modified [place].  Toilet transfer to toilet with Stand-by Assistance.                      Time Tracking:     OT Date of Treatment: 04/09/18  OT Start Time: 1358  OT Stop Time: 1408  OT Total Time (min): 10 min    Billable Minutes:Therapeutic Activity 10    AYLEEN Mata  4/9/2018

## 2018-04-09 NOTE — PLAN OF CARE
PAZ faxed ([phone]) signed PASRR to the state and requested that 142 be sent to her email.     Racheal Osei LCSW

## 2018-04-09 NOTE — PROGRESS NOTES
[facility]  General Surgery  Progress Note    Subjective:     History of Present Illness:  No notes on file    Post-Op Info:  Procedure(s) (LRB):  PANCREATECTOMY-DISTAL (N/A)  SPLENECTOMY (N/A)   3 Days Post-Op     Interval History: No acute events overnight. Pain well controlled on PCA. Tolerating full liquid diet, no nausea/vomiting- belching, not passing gas. Keen in place, UOP adequate.    Medications:  Continuous Infusions:   sodium chloride 0.9% 50 mL/hr at 04/08/18 1047    morphine       Scheduled Meds:   amLODIPine  10 mg Oral Daily    enoxaparin  40 mg Subcutaneous Daily    escitalopram oxalate  10 mg Oral Daily    levalbuterol  0.63 mg Nebulization Q8H WA    pantoprazole  40 mg Oral Daily    promethazine (PHENERGAN) IVPB  12.5 mg Intravenous Once     PRN Meds:diazePAM, naloxone     Review of patient's allergies indicates:  No Known Allergies  Objective:     Vital Signs (Most Recent):  Temp: 98.7 °F (37.1 °C) (04/09/18 0418)  Pulse: 88 (04/09/18 0418)  Resp: 18 (04/09/18 0418)  BP: (!) 161/70 (notified nurse) (04/09/18 0418)  SpO2: (!) 92 % (04/09/18 0418) Vital Signs (24h Range):  Temp:  [97.6 °F (36.4 °C)-99.2 °F (37.3 °C)] 98.7 °F (37.1 °C)  Pulse:  [73-98] 88  Resp:  [15-20] 18  SpO2:  [86 %-93 %] 92 %  BP: (140-162)/(65-70) 161/70     Weight: 66 kg (145 lb 8.1 oz)  Body mass index is 24.21 kg/m².    Intake/Output - Last 3 Shifts       04/07 0700 - 04/08 0659 04/08 0700 - 04/09 0659    P.O. 120 600    I.V. (mL/kg) 3202.4 (48.5) 1134.3 (17.2)    Total Intake(mL/kg) 3322.4 (50.3) 1734.3 (26.3)    Urine (mL/kg/hr) 1125 (0.7) 475 (0.3)    Total Output 1125 475    Net +2197.4 +1259.3                Physical Exam   Constitutional: She is oriented to person, place, and time. She appears well-developed and well-nourished.   HENT:   Head: Normocephalic and atraumatic.   Cardiovascular: Normal rate.    Pulmonary/Chest: Effort normal.   Abdominal: Soft. She exhibits no distension.  There is tenderness (Incisional tenderness, appropriate).   Midline incision, clean, dry and intact with dermabond in place.   Neurological: She is alert and oriented to person, place, and time.   Skin: Skin is warm and dry.   Nursing note and vitals reviewed.      Significant Labs:  CBC:   Recent Labs  Lab 04/08/18  1241   WBC 18.11*   RBC 3.31*   HGB 10.5*   HCT 32.9*      MCV 99*   MCH 31.7*   MCHC 31.9*     CMP:   Recent Labs  Lab 04/08/18  0429   *   CALCIUM 6.7*   ALBUMIN 2.2*   PROT 4.8*      K 3.6   CO2 19*   *   BUN 22   CREATININE 0.7   ALKPHOS 56   ALT 51*   AST 33   BILITOT 0.4       Significant Diagnostics:  I have reviewed all pertinent imaging results/findings within the past 24 hours.    Assessment/Plan:     * Malignant neoplasm of body of pancreas    77F with Carcinoma of the body of the pancreas now s/p Distal pancreatectomy and splenectomy (4/6/18)    - Continue home meds  - Nebs q8h while awake  - d/c PCA, start PO PRN pain meds  - Diet: full liquid with boost  - labs pending this morning, H/H was stable yesterday on recheck, continue to trend  - d/c heard  - Needs to be oob and walking  - Please encourage IS            Sherri Lopez MD  General Surgery  [facility]

## 2018-04-10 LAB
ALBUMIN SERPL BCP-MCNC: 2.1 G/DL
ALP SERPL-CCNC: 72 U/L
ALT SERPL W/O P-5'-P-CCNC: 28 U/L
ANION GAP SERPL CALC-SCNC: 9 MMOL/L
AST SERPL-CCNC: 18 U/L
BASOPHILS # BLD AUTO: 0.02 K/UL
BASOPHILS NFR BLD: 0.2 %
BILIRUB SERPL-MCNC: 0.5 MG/DL
BLD PROD TYP BPU: NORMAL
BLOOD UNIT EXPIRATION DATE: NORMAL
BLOOD UNIT TYPE CODE: 5100
BLOOD UNIT TYPE CODE: 9500
BLOOD UNIT TYPE: NORMAL
BUN SERPL-MCNC: 21 MG/DL
CALCIUM SERPL-MCNC: 8.5 MG/DL
CHLORIDE SERPL-SCNC: 109 MMOL/L
CO2 SERPL-SCNC: 20 MMOL/L
CODING SYSTEM: NORMAL
CREAT SERPL-MCNC: 0.9 MG/DL
DIFFERENTIAL METHOD: ABNORMAL
DISPENSE STATUS: NORMAL
EOSINOPHIL # BLD AUTO: 0.9 K/UL
EOSINOPHIL NFR BLD: 7.5 %
ERYTHROCYTE [DISTWIDTH] IN BLOOD BY AUTOMATED COUNT: 13 %
EST. GFR  (AFRICAN AMERICAN): >60 ML/MIN/1.73 M^2
EST. GFR  (NON AFRICAN AMERICAN): >60 ML/MIN/1.73 M^2
GLUCOSE SERPL-MCNC: 108 MG/DL
HCT VFR BLD AUTO: 32.7 %
HGB BLD-MCNC: 10.8 G/DL
IMM GRANULOCYTES # BLD AUTO: 0.07 K/UL
IMM GRANULOCYTES NFR BLD AUTO: 0.6 %
LYMPHOCYTES # BLD AUTO: 0.9 K/UL
LYMPHOCYTES NFR BLD: 7.2 %
MAGNESIUM SERPL-MCNC: 2.1 MG/DL
MCH RBC QN AUTO: 32.3 PG
MCHC RBC AUTO-ENTMCNC: 33 G/DL
MCV RBC AUTO: 98 FL
MONOCYTES # BLD AUTO: 1.1 K/UL
MONOCYTES NFR BLD: 8.8 %
NEUTROPHILS # BLD AUTO: 9.5 K/UL
NEUTROPHILS NFR BLD: 75.7 %
NRBC BLD-RTO: 0 /100 WBC
NUM UNITS TRANS PACKED RBC: NORMAL
PHOSPHATE SERPL-MCNC: 3.1 MG/DL
PLATELET # BLD AUTO: 246 K/UL
PMV BLD AUTO: 10.3 FL
POTASSIUM SERPL-SCNC: 4.2 MMOL/L
PROT SERPL-MCNC: 5.2 G/DL
RBC # BLD AUTO: 3.34 M/UL
SODIUM SERPL-SCNC: 138 MMOL/L
WBC # BLD AUTO: 12.49 K/UL

## 2018-04-10 PROCEDURE — 87086 URINE CULTURE/COLONY COUNT: CPT

## 2018-04-10 PROCEDURE — 63600175 PHARM REV CODE 636 W HCPCS: Performed by: STUDENT IN AN ORGANIZED HEALTH CARE EDUCATION/TRAINING PROGRAM

## 2018-04-10 PROCEDURE — 94761 N-INVAS EAR/PLS OXIMETRY MLT: CPT

## 2018-04-10 PROCEDURE — 84100 ASSAY OF PHOSPHORUS: CPT

## 2018-04-10 PROCEDURE — 20600001 HC STEP DOWN PRIVATE ROOM

## 2018-04-10 PROCEDURE — 97116 GAIT TRAINING THERAPY: CPT

## 2018-04-10 PROCEDURE — 87186 SC STD MICRODIL/AGAR DIL: CPT

## 2018-04-10 PROCEDURE — 97535 SELF CARE MNGMENT TRAINING: CPT

## 2018-04-10 PROCEDURE — 97530 THERAPEUTIC ACTIVITIES: CPT

## 2018-04-10 PROCEDURE — 25000003 PHARM REV CODE 250: Performed by: SURGERY

## 2018-04-10 PROCEDURE — 80053 COMPREHEN METABOLIC PANEL: CPT

## 2018-04-10 PROCEDURE — 25000003 PHARM REV CODE 250: Performed by: STUDENT IN AN ORGANIZED HEALTH CARE EDUCATION/TRAINING PROGRAM

## 2018-04-10 PROCEDURE — 83735 ASSAY OF MAGNESIUM: CPT

## 2018-04-10 PROCEDURE — 27000221 HC OXYGEN, UP TO 24 HOURS

## 2018-04-10 PROCEDURE — 63600175 PHARM REV CODE 636 W HCPCS: Performed by: SURGERY

## 2018-04-10 PROCEDURE — 36415 COLL VENOUS BLD VENIPUNCTURE: CPT

## 2018-04-10 PROCEDURE — 25000003 PHARM REV CODE 250: Performed by: NURSE PRACTITIONER

## 2018-04-10 PROCEDURE — 87088 URINE BACTERIA CULTURE: CPT

## 2018-04-10 PROCEDURE — 85025 COMPLETE CBC W/AUTO DIFF WBC: CPT

## 2018-04-10 PROCEDURE — 87077 CULTURE AEROBIC IDENTIFY: CPT

## 2018-04-10 RX ORDER — FUROSEMIDE 10 MG/ML
20 INJECTION INTRAMUSCULAR; INTRAVENOUS ONCE
Status: COMPLETED | OUTPATIENT
Start: 2018-04-10 | End: 2018-04-10

## 2018-04-10 RX ORDER — FUROSEMIDE 10 MG/ML
20 INJECTION INTRAMUSCULAR; INTRAVENOUS 2 TIMES DAILY
Status: DISCONTINUED | OUTPATIENT
Start: 2018-04-10 | End: 2018-04-10

## 2018-04-10 RX ORDER — CIPROFLOXACIN 500 MG/1
500 TABLET ORAL EVERY 12 HOURS
Status: DISCONTINUED | OUTPATIENT
Start: 2018-04-10 | End: 2018-04-11 | Stop reason: HOSPADM

## 2018-04-10 RX ORDER — ENOXAPARIN SODIUM 100 MG/ML
40 INJECTION SUBCUTANEOUS DAILY
Qty: 5.6 ML | Refills: 0 | Status: SHIPPED | OUTPATIENT
Start: 2018-04-10 | End: 2018-04-24

## 2018-04-10 RX ORDER — CALCIUM CARBONATE 500(1250)
1500 TABLET ORAL ONCE
Status: COMPLETED | OUTPATIENT
Start: 2018-04-10 | End: 2018-04-10

## 2018-04-10 RX ORDER — CIPROFLOXACIN 500 MG/1
500 TABLET ORAL EVERY 12 HOURS
Qty: 12 TABLET | Refills: 0 | Status: SHIPPED | OUTPATIENT
Start: 2018-04-10 | End: 2018-04-16

## 2018-04-10 RX ADMIN — FUROSEMIDE 20 MG: 10 INJECTION, SOLUTION INTRAMUSCULAR; INTRAVENOUS at 09:04

## 2018-04-10 RX ADMIN — OXYCODONE HYDROCHLORIDE AND ACETAMINOPHEN 1 TABLET: 10; 325 TABLET ORAL at 12:04

## 2018-04-10 RX ADMIN — OXYBUTYNIN CHLORIDE 10 MG: 10 TABLET, FILM COATED, EXTENDED RELEASE ORAL at 09:04

## 2018-04-10 RX ADMIN — PANTOPRAZOLE SODIUM 40 MG: 40 TABLET, DELAYED RELEASE ORAL at 09:04

## 2018-04-10 RX ADMIN — ESCITALOPRAM OXALATE 10 MG: 10 TABLET ORAL at 09:04

## 2018-04-10 RX ADMIN — CIPROFLOXACIN HYDROCHLORIDE 500 MG: 500 TABLET, FILM COATED ORAL at 09:04

## 2018-04-10 RX ADMIN — DIAZEPAM 5 MG: 5 TABLET ORAL at 09:04

## 2018-04-10 RX ADMIN — FUROSEMIDE 20 MG: 10 INJECTION, SOLUTION INTRAMUSCULAR; INTRAVENOUS at 03:04

## 2018-04-10 RX ADMIN — OXYCODONE HYDROCHLORIDE AND ACETAMINOPHEN 1 TABLET: 10; 325 TABLET ORAL at 09:04

## 2018-04-10 RX ADMIN — CALCIUM 1500 MG: 500 TABLET ORAL at 09:04

## 2018-04-10 RX ADMIN — ENOXAPARIN SODIUM 40 MG: 100 INJECTION SUBCUTANEOUS at 09:04

## 2018-04-10 RX ADMIN — AMLODIPINE BESYLATE 10 MG: 10 TABLET ORAL at 09:04

## 2018-04-10 NOTE — PLAN OF CARE
SW sent orders to [facility] via [facility]. Pt anticipated to be discharge ready by tomorrow.     Racheal Osei LCSW

## 2018-04-10 NOTE — PLAN OF CARE
Problem: Occupational Therapy Goal  Goal: Occupational Therapy Goal  Goals to be met by: 4/22/18     Patient will increase functional independence with ADLs by performing:    UE Dressing with [place]. -- Met (4/10)  LE Dressing with Stand-by Assistance. -- Met (4/10)  Grooming while standing at sink with Stand-by Assistance.  -- Met (4/10)  Toileting from toilet with Stand-by Assistance for hygiene and clothing management.  -- Met (4/10)  Sitting at edge of bed x10 minutes with Modified [place].  Supine to sit with Modified [place].  Toilet transfer to toilet with Stand-by Assistance.  -- Met (4/10)     Outcome: Outcome(s) achieved Date Met: 04/10/18  Pt has achieved satisfactory goal achievement this date and is safe to d/c from acute OT to home from hospital w/ HH services.     Comments: D/c from acute OT services

## 2018-04-10 NOTE — PLAN OF CARE
Problem: Physical Therapy Goal  Goal: Physical Therapy Goal  Goals to be met by: 18    Patient will increase functional independence with mobility by performin. Supine to sit with Contact Guard Assistance-met supervision 4/10/18  2. Sit to supine with Contact Guard Assistance  3. Gait  x >100 feet with Contact Guard Assistance using LRAD. -met; SBA 4/10/18  Revised goal: gait 150ft with RW with mod independent-not met  4. Lower extremity exercise program x20 reps per handout, with supervision     Outcome: Ongoing (interventions implemented as appropriate)  Pt's goals revised as needed and pt will continue to benefit from skilled PT services to work towards improved functional mobility including: bed mobility, transfers, and gait.   Ratna Duran, PT  4/10/2018

## 2018-04-10 NOTE — PLAN OF CARE
[facility]       HOME  HEALTH ORDERS                                    FACE TO FACE ENCOUNTER      Patient Name: Rosamaria Aiken  YOB: 1940    PCP: Tyrone Lovett MD   PCP Address: [address] / [place] [address]  PCP Phone Number: [phone]  PCP Fax: [phone]    Encounter Date: 04/11/2018    Admit to Home Health    Diagnoses:  Active Hospital Problems    Diagnosis  POA    *Malignant neoplasm of body of pancreas [C25.1]  Yes    Diabetes [E11.9]  Yes     SSI insulin  Hold metformin due to elevated creat on presentation( baseline is less than 1..)      HTN (hypertension) [I10]  Yes     Continue norvasc but hold losartan hct        Resolved Hospital Problems    Diagnosis Date Resolved POA   No resolved problems to display.       Future Appointments  Date Time Provider Department Center   4/25/2018 10:00 AM Aga Grady NP [facility]   4/25/2018 11:00 AM INJECTION, INFECTIOUS DISEASES [facility] ID INJ Avinash Kurtz       I have seen and examined this patient face to face today. My clinical findings that support the need for the home health skilled services and home bound status are the following:  Weakness/numbness causing balance and gait disturbance due to Surgery making it taxing to leave home.    Allergies:Review of patient's allergies indicates:  No Known Allergies    Diet: regular diet    Activities: activity as tolerated    Nursing:   SN to complete comprehensive assessment including routine vital signs. Instruct on disease process and s/s of complications to report to MD. Review/verify medication list sent home with the patient at time of discharge  and instruct patient/caregiver as needed. Frequency may be adjusted depending on start of care date.    Notify MD if SBP > 160 or < 90; DBP > 90 or < 50; HR > 120 or < 50; Temp > 101          CONSULTS:    Physical Therapy to evaluate and treat. Evaluate for home safety and  equipment needs; Establish/upgrade home exercise program. Perform / instruct on therapeutic exercises, gait training, transfer training, and Range of Motion.  Occupational Therapy to evaluate and treat. Evaluate home environment for safety and equipment needs. Perform/Instruct on transfers, ADL training, ROM, and therapeutic exercises.   to evaluate for community resources/long-range planning.  Aide to provide assistance with personal care, ADLs, and vital signs.      MISCELLANEOUS CARE:  Wound Care Orders:  yes:  Surgical Wound:  Location: midline abdomen open to air    Medications: Review discharge medications with patient and family and provide education.      Current Discharge Medication List      START taking these medications    Details   ciprofloxacin HCl (CIPRO) 500 MG tablet Take 1 tablet (500 mg total) by mouth every 12 (twelve) hours.  Qty: 12 tablet, Refills: 0    Comments: Please deliver to room 641a tomorrow      enoxaparin (LOVENOX) 40 mg/0.4 mL Syrg Inject 0.4 mLs (40 mg total) into the skin once daily.  Qty: 5.6 mL, Refills: 0    Comments: Please deliver to room 641a tomorrow in the am.      oxyCODONE-acetaminophen (PERCOCET) 5-325 mg per tablet Take 1-2 tablets by mouth every 4 to 6 hours as needed.  Qty: 51 tablet, Refills: 0         CONTINUE these medications which have NOT CHANGED    Details   amLODIPine (NORVASC) 10 MG tablet Take 10 mg by mouth once daily.      co-enzyme Q-10 30 mg capsule Take 200 mg by mouth 3 (three) times daily.      CREON 24,000-76,000 -120,000 unit capsule Take 2 capsules by mouth 3 (three) times daily with meals.   Refills: 1      cyanocobalamin (VITAMIN B-12) 1000 MCG tablet Take 100 mcg by mouth once daily.      diazePAM (VALIUM) 5 MG tablet Take 5 mg by mouth 2 (two) times daily as needed.       escitalopram oxalate (LEXAPRO) 10 MG tablet Take 10 mg by mouth once daily.   Refills: 5      losartan (COZAAR) 25 MG tablet Take 25 mg by mouth once daily.       omeprazole (PRILOSEC) 20 MG capsule Take 20 mg by mouth once daily.      ondansetron (ZOFRAN-ODT) 8 MG TbDL 8 mg every 12 (twelve) hours as needed.   Refills: 0      oxybutynin (DITROPAN-XL) 10 MG 24 hr tablet Take 10 mg by mouth once daily.      VIT E ACET-MIN OIL-DIMETHICONE TOP              I certify that this patient is confined to her home and needs intermittent skilled nursing care, physical therapy and occupational therapy.      Electronically Signed  _________________________________  Blanca Escoto, DAVY  04/11/2018

## 2018-04-10 NOTE — NURSING
Oxygen walk test completed, pt. spo2 at 98% on room air upon getting out of bed, standing at bedside. Spo2 remained at 96% walking in [place].

## 2018-04-10 NOTE — PLAN OF CARE
SW received call from OT stating that both she and PT had changed their recommendation from SNF to Home Health. SW went to go meet with Pt. She was dressed in street clothes and was coming out of the restroom on her own. Pt states she was in agreement with this change. Pt's spouse was also in the room and was in agreement. Pt states she has used [facility] in the past and would like to be referred back to them.    SW placed call to NP, Blanca Mercer, to inform her of above and to request home health orders, which she agreed to place.     SW cancelled all three SNF referrals and initiated referral to [facility] via [facility].     SW to continue to follow-up.    BETSEY RomanoW

## 2018-04-10 NOTE — SUBJECTIVE & OBJECTIVE
Interval History: No acute events overnight. Afebrile, UA positive for infection, started cipro- urine culture pending. Pain well controlled on PO PRN pain meds. Tolerating full liquid diet, no nausea/vomiting-  passing gas, some belching. Voiding.     Medications:  Continuous Infusions:   sodium chloride 0.9% 50 mL/hr at 04/09/18 2225     Scheduled Meds:   amLODIPine  10 mg Oral Daily    ciprofloxacin HCl  500 mg Oral Q12H    enoxaparin  40 mg Subcutaneous Daily    escitalopram oxalate  10 mg Oral Daily    oxybutynin  10 mg Oral Daily    pantoprazole  40 mg Oral Daily    promethazine (PHENERGAN) IVPB  12.5 mg Intravenous Once     PRN Meds:diazePAM, hydrALAZINE, HYDROmorphone, naloxone, oxyCODONE-acetaminophen, oxyCODONE-acetaminophen     Review of patient's allergies indicates:  No Known Allergies  Objective:     Vital Signs (Most Recent):  Temp: 96.8 °F (36 °C) (04/10/18 0433)  Pulse: 71 (04/10/18 0433)  Resp: 16 (04/10/18 0433)  BP: (!) 162/74 (04/10/18 0433)  SpO2: (!) 92 % (04/10/18 0433) Vital Signs (24h Range):  Temp:  [96.8 °F (36 °C)-101.5 °F (38.6 °C)] 96.8 °F (36 °C)  Pulse:  [65-93] 71  Resp:  [15-20] 16  SpO2:  [91 %-95 %] 92 %  BP: (138-178)/(65-88) 162/74     Weight: 66 kg (145 lb 8.1 oz)  Body mass index is 24.21 kg/m².    Intake/Output - Last 3 Shifts       04/08 0700 - 04/09 0659 04/09 0700 - 04/10 0659    P.O. 600 360    I.V. (mL/kg) 1545.2 (23.4) 800 (12.1)    IV Piggyback  500    Total Intake(mL/kg) 2145.2 (32.5) 1660 (25.2)    Urine (mL/kg/hr) 975 (0.6) 900 (0.6)    Stool  0 (0)    Total Output 975 900    Net +1170.2 +760          Stool Occurrence  0 x          Physical Exam   Constitutional: She is oriented to person, place, and time. She appears well-developed and well-nourished.   HENT:   Head: Normocephalic and atraumatic.   Cardiovascular: Normal rate.    Pulmonary/Chest: Effort normal.   Abdominal: Soft. She exhibits no distension. There is tenderness (Incisional tenderness,  appropriate).   Midline incision, clean, dry and intact with dermabond in place.   Neurological: She is alert and oriented to person, place, and time.   Skin: Skin is warm and dry.   Nursing note and vitals reviewed.      Significant Labs:  CBC:     Recent Labs  Lab 04/09/18  0626   WBC 15.51*   RBC 3.50*   HGB 11.1*   HCT 33.9*      MCV 97   MCH 31.7*   MCHC 32.7     CMP:     Recent Labs  Lab 04/09/18  0626   *   CALCIUM 8.7   ALBUMIN 2.4*   PROT 5.6*      K 3.6   CO2 19*      BUN 18   CREATININE 0.7   ALKPHOS 76   ALT 43   AST 24   BILITOT 0.6       Significant Diagnostics:  I have reviewed all pertinent imaging results/findings within the past 24 hours.

## 2018-04-10 NOTE — PLAN OF CARE
SW learned in morning huddle that Pt would be medically stable to transfer to SNF tomorrow. SW placed call to Pt's first choice, [facility] ([phone]) SW was told Pt's referral was being reviewed. SW asked for a return call regarding whether they could accept Pt. [facility] is also interested in accepting Pt and [facility] has received referral. SW to continue to follow.     Racheal Osei, BETSEYW

## 2018-04-10 NOTE — PT/OT/SLP PROGRESS
"Physical Therapy Treatment    Patient Name:  Rosamaria Aiken   MRN:  [id number]    Recommendations:     Discharge Recommendations:  home health PT   Discharge Equipment Recommendations: none   Barriers to discharge: None    Assessment:     Rosamaria Aiken is a 77 y.o. female admitted with a medical diagnosis of Malignant neoplasm of body of pancreas.  She presents with the following impairments/functional limitations:  weakness, impaired endurance, impaired functional mobilty, impaired cardiopulmonary response to activity .    Rehab Prognosis:  good; patient would benefit from acute skilled PT services to address these deficits and reach maximum level of function.      Recent Surgery: Procedure(s) (LRB):  PANCREATECTOMY-DISTAL (N/A)  SPLENECTOMY (N/A) 4 Days Post-Op    Plan:     During this hospitalization, patient to be seen 4 x/week to address the above listed problems via gait training, therapeutic activities, therapeutic exercises, neuromuscular re-education  · Plan of Care Expires:  05/08/18   Plan of Care Reviewed with: patient, spouse    Subjective     Communicated with nurse prior to session.  Patient found supine upon PT entry to room, agreeable to treatment.    "I need to use the bathroom first"  Pain/Comfort:  · Pain Rating 1: 5/10 ("I think it may be gas")  · Location - Side 1: Left  · Location 1: abdomen  · Pain Addressed 1: Reposition, Distraction  · Pain Rating Post-Intervention 1: 5/10    Patients cultural, spiritual, Holiness conflicts given the current situation: none noted    Objective:     Patient found with: peripheral IV, telemetry     General Precautions: Standard, fall, diabetic   Orthopedic Precautions:N/A   Braces: N/A     Functional Mobility:  · Bed Mobility:     · Rolling Left:  Supervision with bed rail  · Supine to Sit: supervision with bed rail  · Transfers:     · Sit to Stand:  stand by assistance with rolling walker  · Gait: 70ft then 125ft with RW with supervision. Pt's oxygen sats " on room air in sitting 97% and after gait , 94%. Nurse notified.    AM-PAC 6 CLICK MOBILITY  Turning over in bed (including adjusting bedclothes, sheets and blankets)?: 4  Sitting down on and standing up from a chair with arms (e.g., wheelchair, bedside commode, etc.): 3  Moving from lying on back to sitting on the side of the bed?: 4  Moving to and from a bed to a chair (including a wheelchair)?: 3  Need to walk in hospital room?: 3  Climbing 3-5 steps with a railing?: 2  Total Score: 19       Therapeutic Activities and Exercises:   pt ambulated to the bathroom and urinated, pt requires set up assist to don underwear in sitting on the commode    Patient left up in chair with all lines intact, call button in reach, nurse notified and  present..    GOALS:    Physical Therapy Goals        Problem: Physical Therapy Goal    Goal Priority Disciplines Outcome Goal Variances Interventions   Physical Therapy Goal     PT/OT, PT Ongoing (interventions implemented as appropriate)     Description:  Goals to be met by: 18    Patient will increase functional independence with mobility by performin. Supine to sit with Contact Guard Assistance-met supervision 4/10/18  2. Sit to supine with Contact Guard Assistance  3. Gait  x >100 feet with Contact Guard Assistance using LRAD. -met; SBA 4/10/18  Revised goal: gait 150ft with RW with mod independent-not met  4. Lower extremity exercise program x20 reps per handout, with supervision                       Time Tracking:     PT Received On: 04/10/18  PT Start Time: 1107     PT Stop Time: 1130  PT Total Time (min): 23 min     Billable Minutes: Gait Training 13 and Therapeutic Activity 10    Treatment Type: Treatment  PT/PTA: PT     PTA Visit Number: 1     Ratna Duran, PT  04/10/2018

## 2018-04-10 NOTE — PROGRESS NOTES
[facility]  General Surgery  Progress Note    Subjective:     History of Present Illness:  No notes on file    Post-Op Info:  Procedure(s) (LRB):  PANCREATECTOMY-DISTAL (N/A)  SPLENECTOMY (N/A)   4 Days Post-Op     Interval History: No acute events overnight. Afebrile, UA positive for infection, started cipro- urine culture pending. Pain well controlled on PO PRN pain meds. Tolerating full liquid diet, no nausea/vomiting-  passing gas, some belching. Voiding.     Medications:  Continuous Infusions:   sodium chloride 0.9% 50 mL/hr at 04/09/18 2225     Scheduled Meds:   amLODIPine  10 mg Oral Daily    ciprofloxacin HCl  500 mg Oral Q12H    enoxaparin  40 mg Subcutaneous Daily    escitalopram oxalate  10 mg Oral Daily    oxybutynin  10 mg Oral Daily    pantoprazole  40 mg Oral Daily    promethazine (PHENERGAN) IVPB  12.5 mg Intravenous Once     PRN Meds:diazePAM, hydrALAZINE, HYDROmorphone, naloxone, oxyCODONE-acetaminophen, oxyCODONE-acetaminophen     Review of patient's allergies indicates:  No Known Allergies  Objective:     Vital Signs (Most Recent):  Temp: 96.8 °F (36 °C) (04/10/18 0433)  Pulse: 71 (04/10/18 0433)  Resp: 16 (04/10/18 0433)  BP: (!) 162/74 (04/10/18 0433)  SpO2: (!) 92 % (04/10/18 0433) Vital Signs (24h Range):  Temp:  [96.8 °F (36 °C)-101.5 °F (38.6 °C)] 96.8 °F (36 °C)  Pulse:  [65-93] 71  Resp:  [15-20] 16  SpO2:  [91 %-95 %] 92 %  BP: (138-178)/(65-88) 162/74     Weight: 66 kg (145 lb 8.1 oz)  Body mass index is 24.21 kg/m².    Intake/Output - Last 3 Shifts       04/08 0700 - 04/09 0659 04/09 0700 - 04/10 0659    P.O. 600 360    I.V. (mL/kg) 1545.2 (23.4) 800 (12.1)    IV Piggyback  500    Total Intake(mL/kg) 2145.2 (32.5) 1660 (25.2)    Urine (mL/kg/hr) 975 (0.6) 900 (0.6)    Stool  0 (0)    Total Output 975 900    Net +1170.2 +760          Stool Occurrence  0 x          Physical Exam   Constitutional: She is oriented to person, place, and time. She appears  well-developed and well-nourished.   HENT:   Head: Normocephalic and atraumatic.   Cardiovascular: Normal rate.    Pulmonary/Chest: Effort normal.   Abdominal: Soft. She exhibits no distension. There is tenderness (Incisional tenderness, appropriate).   Midline incision, clean, dry and intact with dermabond in place.   Neurological: She is alert and oriented to person, place, and time.   Skin: Skin is warm and dry.   Nursing note and vitals reviewed.      Significant Labs:  CBC:     Recent Labs  Lab 04/09/18  0626   WBC 15.51*   RBC 3.50*   HGB 11.1*   HCT 33.9*      MCV 97   MCH 31.7*   MCHC 32.7     CMP:     Recent Labs  Lab 04/09/18  0626   *   CALCIUM 8.7   ALBUMIN 2.4*   PROT 5.6*      K 3.6   CO2 19*      BUN 18   CREATININE 0.7   ALKPHOS 76   ALT 43   AST 24   BILITOT 0.6       Significant Diagnostics:  I have reviewed all pertinent imaging results/findings within the past 24 hours.    Assessment/Plan:     * Malignant neoplasm of body of pancreas    77F with Carcinoma of the body of the pancreas now s/p Distal pancreatectomy and splenectomy (4/6/18)    - Continue home meds  - Nebs q8h while awake  - continue PO PRN pain meds  - Diet: full liquid with boost, advance to regular diet  - labs pending this morning, continue to trend  - febrile yesterday with positive Ua, cipro started today, no fevers overnight  - Needs to be oob and walking  - Please encourage IS  - voiding  - will discuss with staff            Sherri Lopez MD  General Surgery  [facility]

## 2018-04-10 NOTE — PLAN OF CARE
Problem: Patient Care Overview  Goal: Plan of Care Review  Plan of care reviewed with patient and pt. Spouse. AAOX4, 4L NC with spo2 at 93%. ML incision to abdomen intact. PCA pump discontinued per order. Pain not managed with prn percocet, notified Blanca Hamilton, pain managed with breakthrough pain meds. B/P increased to 160's-170's systolic, prn hydralazine given. Pt. Voided adequate urine output post catheter removal, pt. Voided 450ml from 9239-2280. Full liquid diet maintained, no complaints of nausea. Temp. Of 100.4, notifed Blanca Hamilton. Pt. Ambulated in halls using walker with physical therapy. Pt. Ambulated in room, to bathroom using walker with nursing staff. Pt. Sat in chair for 2 hours. Call light within reach, will continue to monitor.

## 2018-04-10 NOTE — PLAN OF CARE
Problem: Patient Care Overview  Goal: Plan of Care Review  Outcome: Ongoing (interventions implemented as appropriate)  BP steadily increasing through the night. Pt. Reports no c/o pain. Pt seems calm, asymptomatic. SpO2=92% on 4L nc the past 2 days due to desaturation on room air. Orders for oxygen and parameters needed. U/a was sent last night. Pt getting up to the bathroom with walker.  Will continue to monitor.

## 2018-04-10 NOTE — PT/OT/SLP PROGRESS
"Occupational Therapy   Treatment/Discharge Summary    Name: Rosamaria Aiken  MRN: [id number]  Admitting Diagnosis:  Malignant neoplasm of body of pancreas  4 Days Post-Op    Recommendations:     Discharge Recommendations: home health OT, home health PT  Discharge Equipment Recommendations:  none  Barriers to discharge:  None    Subjective     Communicated with: RN prior to session.  Pain/Comfort:  · Pain Rating 1:  (did not rate but reports "it feels like gas")  · Location - Side 1: Left  · Location 1: abdomen  · Pain Addressed 1: Reposition, Distraction, Nurse notified  · Pain Rating Post-Intervention 1:  (did not rate )    Patients cultural, spiritual, Amish conflicts given the current situation: none noted     Objective:     Patient found with:  (no lines )    General Precautions: Standard, fall, diabetic   Orthopedic Precautions:N/A   Braces: N/A     Occupational Performance:    Bed Mobility:    · Patient completed Scooting/Bridging with independence  · Patient completed Supine to Sit with independence     Functional Mobility/Transfers:  · Patient completed Sit <> Stand Transfer with modified independence  with  rolling walker   · Patient completed Bed <> Chair Transfer using Stand Pivot technique with modified independence with rolling walker  · Patient completed Toilet Transfer Stand Pivot technique with modified independence with  grab bars and rolling walker  · Functional Mobility: Pt ambulated community distance w/ Supervision and RW; pt required one sit down rest break. No signs of LOB noted      Activities of Daily Living:  · Grooming: modified independence washing hands at sink   · UB Dressing: independence donning pullover shirt  · LB Dressing: modified independence donning briefs and pj pants in sitting and completing task of pulling over hips in standing   · Toileting: modified independence for clothing management and chuck-hygiene on bathroom toilet    Patient left up in chair with call button in " reach and spouse and RN present    [facility] 6 Click: Self-care   [facility] Total Score: 24    Treatment & Education:  - OT POC & d/c rec of HH instead of SNF - pt verbalized understanding   - Importance of OOB activity to maximize recovery and increase endurance  - Safety with functional mobility; proper hand placement to complete safe functional transfers  - Deep breathing and energy conservation techniques while performing ADLs and mobility tasks to ensure safety and maximize performance    Education:    Assessment:     Rosamaria Aiken is a 77 y.o. female with a medical diagnosis of Malignant neoplasm of body of pancreas.  She presents with no acute OT needs at this time and is safe to d/c from acute OT services. Pt presents w/ safe & independent performance of ADLs and mobility tasks w/ RW. OT rec changed to  services upon d/c from hospital 2/2 pt's great functional improvements.      Performance deficits affecting function are impaired endurance, impaired cardiopulmonary response to activity.      Rehab Prognosis:  Good; patient would benefit from acute skilled OT services to address these deficits and reach maximum level of function.       Plan:     D/c from acute OT; please re-consult if anything changes    · Plan of Care Expires: 05/08/18  · Plan of Care Reviewed with: patient, spouse    This Plan of care has been discussed with the patient who was involved in its development and understands and is in agreement with the identified goals and treatment plan    GOALS:    Occupational Therapy Goals     Not on file          Multidisciplinary Problems (Resolved)        Problem: Occupational Therapy Goal    Goal Priority Disciplines Outcome Interventions   Occupational Therapy Goal   (Resolved)     OT, PT/OT Outcome(s) achieved    Description:  Goals to be met by: 4/22/18     Patient will increase functional independence with ADLs by performing:    UE Dressing with [place]. -- Met (4/10)  LE Dressing with Stand-by  Assistance. -- Met (4/10)  Grooming while standing at sink with Stand-by Assistance.  -- Met (4/10)  Toileting from toilet with Stand-by Assistance for hygiene and clothing management.  -- Met (4/10)  Sitting at edge of bed x10 minutes with Modified [place]. -- Met (4/10)  Supine to sit with Modified [place]. -- Met (4/10)  Toilet transfer to toilet with Stand-by Assistance.  -- Met (4/10)                       Time Tracking:     OT Date of Treatment: 04/10/18  OT Start Time: 1309  OT Stop Time: 1347  OT Total Time (min): 38 min    Billable Minutes:Self Care/Home Management 23  Therapeutic Activity 15    Dora Griffiths, OT  4/10/2018

## 2018-04-11 VITALS
WEIGHT: 145.5 LBS | BODY MASS INDEX: 24.24 KG/M2 | HEART RATE: 95 BPM | HEIGHT: 65 IN | OXYGEN SATURATION: 92 % | DIASTOLIC BLOOD PRESSURE: 67 MMHG | RESPIRATION RATE: 18 BRPM | SYSTOLIC BLOOD PRESSURE: 155 MMHG | TEMPERATURE: 98 F

## 2018-04-11 LAB
ALBUMIN SERPL BCP-MCNC: 2.3 G/DL
ALP SERPL-CCNC: 68 U/L
ALT SERPL W/O P-5'-P-CCNC: 22 U/L
ANION GAP SERPL CALC-SCNC: 10 MMOL/L
AST SERPL-CCNC: 13 U/L
BASOPHILS # BLD AUTO: 0.01 K/UL
BASOPHILS NFR BLD: 0.1 %
BILIRUB SERPL-MCNC: 0.5 MG/DL
BUN SERPL-MCNC: 15 MG/DL
CALCIUM SERPL-MCNC: 8.8 MG/DL
CHLORIDE SERPL-SCNC: 105 MMOL/L
CO2 SERPL-SCNC: 27 MMOL/L
CREAT SERPL-MCNC: 0.9 MG/DL
DIFFERENTIAL METHOD: ABNORMAL
EOSINOPHIL # BLD AUTO: 0.5 K/UL
EOSINOPHIL NFR BLD: 5.1 %
ERYTHROCYTE [DISTWIDTH] IN BLOOD BY AUTOMATED COUNT: 12.9 %
EST. GFR  (AFRICAN AMERICAN): >60 ML/MIN/1.73 M^2
EST. GFR  (NON AFRICAN AMERICAN): >60 ML/MIN/1.73 M^2
GLUCOSE SERPL-MCNC: 144 MG/DL
HCT VFR BLD AUTO: 31.5 %
HGB BLD-MCNC: 10.6 G/DL
IMM GRANULOCYTES # BLD AUTO: 0.08 K/UL
IMM GRANULOCYTES NFR BLD AUTO: 0.8 %
LYMPHOCYTES # BLD AUTO: 1 K/UL
LYMPHOCYTES NFR BLD: 9.9 %
MAGNESIUM SERPL-MCNC: 1.6 MG/DL
MCH RBC QN AUTO: 31.8 PG
MCHC RBC AUTO-ENTMCNC: 33.7 G/DL
MCV RBC AUTO: 95 FL
MONOCYTES # BLD AUTO: 1.1 K/UL
MONOCYTES NFR BLD: 11.3 %
NEUTROPHILS # BLD AUTO: 7 K/UL
NEUTROPHILS NFR BLD: 72.8 %
NRBC BLD-RTO: 0 /100 WBC
PHOSPHATE SERPL-MCNC: 3 MG/DL
PLATELET # BLD AUTO: 301 K/UL
PMV BLD AUTO: 10.4 FL
POTASSIUM SERPL-SCNC: 3.6 MMOL/L
PROT SERPL-MCNC: 5.6 G/DL
RBC # BLD AUTO: 3.33 M/UL
SODIUM SERPL-SCNC: 142 MMOL/L
WBC # BLD AUTO: 9.63 K/UL

## 2018-04-11 PROCEDURE — 25000003 PHARM REV CODE 250: Performed by: STUDENT IN AN ORGANIZED HEALTH CARE EDUCATION/TRAINING PROGRAM

## 2018-04-11 PROCEDURE — 83735 ASSAY OF MAGNESIUM: CPT

## 2018-04-11 PROCEDURE — 25000003 PHARM REV CODE 250: Performed by: NURSE PRACTITIONER

## 2018-04-11 PROCEDURE — 84100 ASSAY OF PHOSPHORUS: CPT

## 2018-04-11 PROCEDURE — 63600175 PHARM REV CODE 636 W HCPCS: Performed by: SURGERY

## 2018-04-11 PROCEDURE — 36415 COLL VENOUS BLD VENIPUNCTURE: CPT

## 2018-04-11 PROCEDURE — 25000003 PHARM REV CODE 250: Performed by: SURGERY

## 2018-04-11 PROCEDURE — 85025 COMPLETE CBC W/AUTO DIFF WBC: CPT

## 2018-04-11 PROCEDURE — 80053 COMPREHEN METABOLIC PANEL: CPT

## 2018-04-11 RX ORDER — OXYCODONE AND ACETAMINOPHEN 5; 325 MG/1; MG/1
1-2 TABLET ORAL
Qty: 51 TABLET | Refills: 0 | Status: SHIPPED | OUTPATIENT
Start: 2018-04-11 | End: 2018-04-23 | Stop reason: SDUPTHER

## 2018-04-11 RX ADMIN — CIPROFLOXACIN HYDROCHLORIDE 500 MG: 500 TABLET, FILM COATED ORAL at 09:04

## 2018-04-11 RX ADMIN — OXYBUTYNIN CHLORIDE 10 MG: 10 TABLET, FILM COATED, EXTENDED RELEASE ORAL at 09:04

## 2018-04-11 RX ADMIN — AMLODIPINE BESYLATE 10 MG: 10 TABLET ORAL at 09:04

## 2018-04-11 RX ADMIN — OXYCODONE HYDROCHLORIDE AND ACETAMINOPHEN 1 TABLET: 10; 325 TABLET ORAL at 11:04

## 2018-04-11 RX ADMIN — ESCITALOPRAM OXALATE 10 MG: 10 TABLET ORAL at 09:04

## 2018-04-11 RX ADMIN — PANTOPRAZOLE SODIUM 40 MG: 40 TABLET, DELAYED RELEASE ORAL at 09:04

## 2018-04-11 RX ADMIN — ENOXAPARIN SODIUM 40 MG: 100 INJECTION SUBCUTANEOUS at 09:04

## 2018-04-11 NOTE — DISCHARGE SUMMARY
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 She has participated in our Prehabilitation program and her functional status and debility have markedly improved, albeit not all the way to normal. She has undergone preop cardiac testing which reveals no evidence of reversible ischemia and normal EF.   She now feels well and is admitted for distal pancreatectomy and splenectomy. She has received preop splenectomy vaccinations..      Procedure(s) (LRB):  PANCREATECTOMY-DISTAL (N/A)  SPLENECTOMY (N/A)     Hospital Course: The patient tolerated the procedure well and subsequently had a benign hospital course.     She did have a fever, found to be secondary to UTI.  UA was consistent, and Ucx has GNR, susceptibility pending.  No further fevers, even before initiation of cipro.  We will discharge her to finish a 7 day course of this, but will adjust it if sensitivies require.      Oxygen was weaned off to room air, with the aid of diuresis.    Pt was started on a surgically progressive diet, which she tolerated well.  At this time, her pain is controlled with oral pain medication, she is ambulating well, and is able to urinate on her own.  Patient stable for discharge.        Pending Diagnostic Studies:     None        Final Active Diagnoses:    Diagnosis Date Noted POA    PRINCIPAL PROBLEM:  Malignant neoplasm of body of pancreas [C25.1] 01/29/2018 Yes    Diabetes [E11.9] 12/08/2017  Yes    HTN (hypertension) [I10] 12/08/2017 Yes      Problems Resolved During this Admission:    Diagnosis Date Noted Date Resolved POA      Discharged Condition: good    Disposition: Home or Self Care    Follow Up:  Follow-up Information     Lamonte Arriaga MD In 2 weeks.    Specialty:  General Surgery  Why:  For wound re-check  Contact information:  [address]  [facility] [address]  [phone]                 Patient Instructions:     Activity as tolerated     Lifting restrictions   Order Comments: Do not lift anything greater than 10-15 lbs for 6 weeks after surgery     Notify your health care provider if you experience any of the following:  increased confusion or weakness     Notify your health care provider if you experience any of the following:  persistent dizziness, light-headedness, or visual disturbances     Notify your health care provider if you experience any of the following:  worsening rash     Notify your health care provider if you experience any of the following:  severe persistent headache     Notify your health care provider if you experience any of the following:  difficulty breathing or increased cough     Notify your health care provider if you experience any of the following:  redness, tenderness, or signs of infection (pain, swelling, redness, odor or green/yellow discharge around incision site)     Notify your health care provider if you experience any of the following:  severe uncontrolled pain     Notify your health care provider if you experience any of the following:  persistent nausea and vomiting or diarrhea     Notify your health care provider if you experience any of the following:  temperature >100.4     No dressing needed       Medications:  Reconciled Home Medications:      Medication List      START taking these medications    ciprofloxacin HCl 500 MG tablet  Commonly known as:  CIPRO  Take 1 tablet (500 mg total) by mouth every 12 (twelve) hours.     enoxaparin 40  mg/0.4 mL Syrg  Commonly known as:  LOVENOX  Inject 0.4 mLs (40 mg total) into the skin once daily.     oxyCODONE-acetaminophen 5-325 mg per tablet  Commonly known as:  PERCOCET  Take 1-2 tablets by mouth every 4 to 6 hours as needed.        CONTINUE taking these medications    amLODIPine 10 MG tablet  Commonly known as:  NORVASC  Take 10 mg by mouth once daily.     co-enzyme Q-10 30 mg capsule  Take 200 mg by mouth 3 (three) times daily.     CREON 24,000-76,000 -120,000 unit capsule  Generic drug:  lipase-protease-amylase 24,000-76,000-120,000 units  Take 2 capsules by mouth 3 (three) times daily with meals.     diazePAM 5 MG tablet  Commonly known as:  VALIUM  Take 5 mg by mouth 2 (two) times daily as needed.     escitalopram oxalate 10 MG tablet  Commonly known as:  LEXAPRO  Take 10 mg by mouth once daily.     losartan 25 MG tablet  Commonly known as:  COZAAR  Take 25 mg by mouth once daily.     omeprazole 20 MG capsule  Commonly known as:  PRILOSEC  Take 20 mg by mouth once daily.     ondansetron 8 MG Tbdl  Commonly known as:  ZOFRAN-ODT  8 mg every 12 (twelve) hours as needed.     oxybutynin 10 MG 24 hr tablet  Commonly known as:  DITROPAN-XL  Take 10 mg by mouth once daily.     VIT E ACET-MIN OIL-DIMETHICONE TOP     VITAMIN B-12 1000 MCG tablet  Generic drug:  cyanocobalamin  Take 100 mcg by mouth once daily.            MEGAN Gagnon Jr., MD  Surgical Oncology  [facility]

## 2018-04-11 NOTE — SUBJECTIVE & OBJECTIVE
Interval History: No acute events overnight. Afebrile, continues on cipro for UA. Pain well controlled on PO PRN pain meds. Tolerating regular diet, no nausea/vomiting-  passing gas. Voiding.     Medications:  Continuous Infusions:    Scheduled Meds:   amLODIPine  10 mg Oral Daily    ciprofloxacin HCl  500 mg Oral Q12H    enoxaparin  40 mg Subcutaneous Daily    escitalopram oxalate  10 mg Oral Daily    oxybutynin  10 mg Oral Daily    pantoprazole  40 mg Oral Daily    promethazine (PHENERGAN) IVPB  12.5 mg Intravenous Once     PRN Meds:diazePAM, hydrALAZINE, HYDROmorphone, naloxone, oxyCODONE-acetaminophen, oxyCODONE-acetaminophen     Review of patient's allergies indicates:  No Known Allergies  Objective:     Vital Signs (Most Recent):  Temp: 98.6 °F (37 °C) (04/11/18 0415)  Pulse: 82 (04/11/18 0415)  Resp: 18 (04/11/18 0415)  BP: (!) 150/67 (04/11/18 0415)  SpO2: (!) 89 % (04/11/18 0415) Vital Signs (24h Range):  Temp:  [98.2 °F (36.8 °C)-99.3 °F (37.4 °C)] 98.6 °F (37 °C)  Pulse:  [74-86] 82  Resp:  [18-20] 18  SpO2:  [89 %-95 %] 89 %  BP: (112-185)/(61-80) 150/67     Weight: 66 kg (145 lb 8.1 oz)  Body mass index is 24.21 kg/m².    Intake/Output - Last 3 Shifts       04/09 0700 - 04/10 0659 04/10 0700 - 04/11 0659    P.O. 420 440    I.V. (mL/kg) 1208.3 (18.3)     IV Piggyback 500     Total Intake(mL/kg) 2128.3 (32.2) 440 (6.7)    Urine (mL/kg/hr) 1300 (0.8) 2750 (1.7)    Stool 0 (0) 0 (0)    Total Output 1300 2750    Net +828.3 -2310          Urine Occurrence  2 x    Stool Occurrence 0 x 0 x    Emesis Occurrence  0 x          Physical Exam   Constitutional: She is oriented to person, place, and time. She appears well-developed and well-nourished.   HENT:   Head: Normocephalic and atraumatic.   Cardiovascular: Normal rate.    Pulmonary/Chest: Effort normal.   Abdominal: Soft. She exhibits no distension. There is no tenderness (Incisional tenderness, appropriate).   Midline incision, clean, dry and intact  with dermabond in place.   Neurological: She is alert and oriented to person, place, and time.   Skin: Skin is warm and dry.   Nursing note and vitals reviewed.      Significant Labs:  CBC:     Recent Labs  Lab 04/10/18  0851   WBC 12.49   RBC 3.34*   HGB 10.8*   HCT 32.7*      MCV 98   MCH 32.3*   MCHC 33.0     CMP:     Recent Labs  Lab 04/10/18  0424      CALCIUM 8.5*   ALBUMIN 2.1*   PROT 5.2*      K 4.2   CO2 20*      BUN 21   CREATININE 0.9   ALKPHOS 72   ALT 28   AST 18   BILITOT 0.5       Significant Diagnostics:  I have reviewed all pertinent imaging results/findings within the past 24 hours.

## 2018-04-11 NOTE — NURSING
Discharge plans reviewed w/ pt and family at bedside. PIV removed. All Rx delivered to bedside. Awaiting transport.

## 2018-04-11 NOTE — PHYSICIAN QUERY
PT Name: Rosamaria Aiken  MR #: [id number]     Physician Query Form - Documentation Clarification      CDS/: Brandy E Capley               Contact information: Spectralink:  [phone]    This form is a permanent document in the medical record.     Query Date: April 11, 2018    By submitting this query, we are merely seeking further clarification of documentation. Please utilize your independent clinical judgment when addressing the question(s) below.    The Medical record reflects the following:    Supporting Clinical Findings Location in Medical Record     Phosphorus= 1.4 --> 1.6     Labs 4/8 --> 4/9     potassium phosphate 30 mmol in dextrose 5 % 500 mL infusion  :  Dose 30 mmol  :  83.3 mL/hr  :  Intravenous  :  Once       MAR 4/9                                                                            Doctor, Please specify diagnosis or diagnoses associated with above clinical findings.    Provider Use Only       ( xxx )  Hypophosphatemia     (  )  Other (please specify):  _____________________________________________                                                                                                             [  ] Clinically undetermined

## 2018-04-11 NOTE — PROGRESS NOTES
[facility]  General Surgery  Progress Note    Subjective:     History of Present Illness:  No notes on file    Post-Op Info:  Procedure(s) (LRB):  PANCREATECTOMY-DISTAL (N/A)  SPLENECTOMY (N/A)   5 Days Post-Op     Interval History: No acute events overnight. Afebrile, continues on cipro for UA. Pain well controlled on PO PRN pain meds. Tolerating regular diet, no nausea/vomiting-  passing gas. Voiding.     Medications:  Continuous Infusions:    Scheduled Meds:   amLODIPine  10 mg Oral Daily    ciprofloxacin HCl  500 mg Oral Q12H    enoxaparin  40 mg Subcutaneous Daily    escitalopram oxalate  10 mg Oral Daily    oxybutynin  10 mg Oral Daily    pantoprazole  40 mg Oral Daily    promethazine (PHENERGAN) IVPB  12.5 mg Intravenous Once     PRN Meds:diazePAM, hydrALAZINE, HYDROmorphone, naloxone, oxyCODONE-acetaminophen, oxyCODONE-acetaminophen     Review of patient's allergies indicates:  No Known Allergies  Objective:     Vital Signs (Most Recent):  Temp: 98.6 °F (37 °C) (04/11/18 0415)  Pulse: 82 (04/11/18 0415)  Resp: 18 (04/11/18 0415)  BP: (!) 150/67 (04/11/18 0415)  SpO2: (!) 89 % (04/11/18 0415) Vital Signs (24h Range):  Temp:  [98.2 °F (36.8 °C)-99.3 °F (37.4 °C)] 98.6 °F (37 °C)  Pulse:  [74-86] 82  Resp:  [18-20] 18  SpO2:  [89 %-95 %] 89 %  BP: (112-185)/(61-80) 150/67     Weight: 66 kg (145 lb 8.1 oz)  Body mass index is 24.21 kg/m².    Intake/Output - Last 3 Shifts       04/09 0700 - 04/10 0659 04/10 0700 - 04/11 0659    P.O. 420 440    I.V. (mL/kg) 1208.3 (18.3)     IV Piggyback 500     Total Intake(mL/kg) 2128.3 (32.2) 440 (6.7)    Urine (mL/kg/hr) 1300 (0.8) 2750 (1.7)    Stool 0 (0) 0 (0)    Total Output 1300 2750    Net +828.3 -2310          Urine Occurrence  2 x    Stool Occurrence 0 x 0 x    Emesis Occurrence  0 x          Physical Exam   Constitutional: She is oriented to person, place, and time. She appears well-developed and well-nourished.   HENT:   Head: Normocephalic  and atraumatic.   Cardiovascular: Normal rate.    Pulmonary/Chest: Effort normal.   Abdominal: Soft. She exhibits no distension. There is no tenderness (Incisional tenderness, appropriate).   Midline incision, clean, dry and intact with dermabond in place.   Neurological: She is alert and oriented to person, place, and time.   Skin: Skin is warm and dry.   Nursing note and vitals reviewed.      Significant Labs:  CBC:     Recent Labs  Lab 04/10/18  0851   WBC 12.49   RBC 3.34*   HGB 10.8*   HCT 32.7*      MCV 98   MCH 32.3*   MCHC 33.0     CMP:     Recent Labs  Lab 04/10/18  0424      CALCIUM 8.5*   ALBUMIN 2.1*   PROT 5.2*      K 4.2   CO2 20*      BUN 21   CREATININE 0.9   ALKPHOS 72   ALT 28   AST 18   BILITOT 0.5       Significant Diagnostics:  I have reviewed all pertinent imaging results/findings within the past 24 hours.    Assessment/Plan:     * Malignant neoplasm of body of pancreas    77F with Carcinoma of the body of the pancreas now s/p Distal pancreatectomy and splenectomy (4/6/18)    - Continue home meds  - continue PO PRN pain meds  - Diet:regular diet  - labs pending this morning, continue to trend  - afebrile, positive Ua, continues on cipro, no fevers overnight  - Needs to be oob and walking  - Please encourage IS- on room air this morning  - voiding  - discharge home today              Sherri Lopez MD  General Surgery  [facility]

## 2018-04-11 NOTE — PLAN OF CARE
Patient discharging home today with [facility].      Future Appointments  Date Time Provider Department Center   4/25/2018 10:00 AM [facility]   4/25/2018 11:00 AM INJECTION, INFECTIOUS DISEASES [facility]     Dr Lovett 4/19/2018 @ 1:30pm     04/11/18 1002   Final Note   Assessment Type Final Discharge Note   Discharge Disposition Home-Health   Hospital Follow Up  Appt(s) scheduled? Yes   Right Care Referral Info   Post Acute Recommendation Home-care

## 2018-04-11 NOTE — PLAN OF CARE
SW learned in morning huddle that Pt is discharge ready for today. SW placed call to [facility] ([phone]) to inform them of Pt's discharge.     BETSEY RomanoW

## 2018-04-11 NOTE — PLAN OF CARE
Problem: Patient Care Overview  Goal: Plan of Care Review  Plan of care reviewed with pt and pt spouse. AAOX4, vitals completed, Systolic B/P in 160's, prn pain meds given, b/p decreased to 130's Oxygen weaned to room air. spo2 at 96% on room air. Incision to ML with dermabond intact. IV fluids discontinued per order. Diet advanced to regular. Pain managed with prn meds. Pt. Ambulates in room independently using walker. Pt. Ambulated in halls with physical therapy. Ambulates to bathroom using toilet with adequate urine output. Will monitor.

## 2018-04-11 NOTE — PLAN OF CARE
Problem: Patient Care Overview  Goal: Plan of Care Review  Outcome: Ongoing (interventions implemented as appropriate)  POC reviewed with pt. Who verbalized understanding. AAOx 4. Remains free of falls and injuries. VSS. Tolerating regular diet, denies nausea. Pain controlled with PRN meds.Independent activity. ML-DB. No acute events. No distress noted. WCTM.

## 2018-04-12 LAB — BACTERIA UR CULT: NORMAL

## 2018-04-12 NOTE — PROGRESS NOTES
Physical Therapy Discharge Summary    Name: Rosamaria Aiken  MRN: [id number]   Principal Problem: Malignant neoplasm of body of pancreas     Patient Discharged from acute Physical Therapy on 18.  Please refer to prior PT noted date on 4/10/18 for functional status.     Assessment:     Patient appropriate for care in another setting.    Objective:     GOALS:    Physical Therapy Goals        Problem: Physical Therapy Goal    Goal Priority Disciplines Outcome Goal Variances Interventions   Physical Therapy Goal     PT/OT, PT Ongoing (interventions implemented as appropriate)     Description:  Goals to be met by: 18    Patient will increase functional independence with mobility by performin. Supine to sit with Contact Guard Assistance-met supervision 4/10/18  2. Sit to supine with Contact Guard Assistance  3. Gait  x >100 feet with Contact Guard Assistance using LRAD. -met; SBA 4/10/18  Revised goal: gait 150ft with RW with mod independent-not met  4. Lower extremity exercise program x20 reps per handout, with supervision                       Reasons for Discontinuation of Therapy Services  Transfer to alternate level of care.      Plan:     Patient Discharged to: Home with Home Health Service.    Ratna Duran, PT  2018

## 2018-04-14 LAB
BACTERIA BLD CULT: NORMAL
BACTERIA BLD CULT: NORMAL

## 2018-04-16 ENCOUNTER — PATIENT MESSAGE (OUTPATIENT)
Dept: SURGERY | Facility: CLINIC | Age: 78
End: 2018-04-16

## 2018-04-18 ENCOUNTER — TELEPHONE (OUTPATIENT)
Dept: SURGERY | Facility: CLINIC | Age: 78
End: 2018-04-18

## 2018-04-23 ENCOUNTER — PATIENT MESSAGE (OUTPATIENT)
Dept: SURGERY | Facility: CLINIC | Age: 78
End: 2018-04-23

## 2018-04-23 DIAGNOSIS — C25.9 PANCREATIC ADENOCARCINOMA: Primary | ICD-10-CM

## 2018-04-23 RX ORDER — OXYCODONE AND ACETAMINOPHEN 5; 325 MG/1; MG/1
1-2 TABLET ORAL
Qty: 51 TABLET | Refills: 0 | Status: SHIPPED | OUTPATIENT
Start: 2018-04-23 | End: 2018-06-06

## 2018-04-24 ENCOUNTER — LAB VISIT (OUTPATIENT)
Dept: LAB | Facility: HOSPITAL | Age: 78
End: 2018-04-24
Payer: MEDICARE

## 2018-04-24 ENCOUNTER — OFFICE VISIT (OUTPATIENT)
Dept: SURGERY | Facility: CLINIC | Age: 78
End: 2018-04-24
Payer: MEDICARE

## 2018-04-24 ENCOUNTER — CLINICAL SUPPORT (OUTPATIENT)
Dept: INFECTIOUS DISEASES | Facility: CLINIC | Age: 78
End: 2018-04-24
Payer: MEDICARE

## 2018-04-24 VITALS
DIASTOLIC BLOOD PRESSURE: 75 MMHG | TEMPERATURE: 98 F | HEART RATE: 64 BPM | WEIGHT: 125.88 LBS | BODY MASS INDEX: 20.95 KG/M2 | SYSTOLIC BLOOD PRESSURE: 174 MMHG | RESPIRATION RATE: 17 BRPM

## 2018-04-24 DIAGNOSIS — Z09 SURGERY FOLLOW-UP: Primary | ICD-10-CM

## 2018-04-24 DIAGNOSIS — Z09 SURGERY FOLLOW-UP: ICD-10-CM

## 2018-04-24 DIAGNOSIS — C25.2 MALIGNANT NEOPLASM OF TAIL OF PANCREAS: ICD-10-CM

## 2018-04-24 DIAGNOSIS — D3A.8 NEUROENDOCRINE TUMOR OF PANCREAS: ICD-10-CM

## 2018-04-24 PROBLEM — R78.81 BACTEREMIA: Status: RESOLVED | Noted: 2017-12-08 | Resolved: 2018-04-24

## 2018-04-24 PROBLEM — R97.8 ELEVATED CA 19-9 LEVEL: Status: RESOLVED | Noted: 2018-01-29 | Resolved: 2018-04-24

## 2018-04-24 PROBLEM — Z74.09 OTHER REDUCED MOBILITY: Status: RESOLVED | Noted: 2018-01-29 | Resolved: 2018-04-24

## 2018-04-24 PROBLEM — R52 PAIN: Status: RESOLVED | Noted: 2018-01-02 | Resolved: 2018-04-24

## 2018-04-24 PROBLEM — R53.1 GENERALIZED WEAKNESS: Status: RESOLVED | Noted: 2017-12-07 | Resolved: 2018-04-24

## 2018-04-24 PROBLEM — C25.1 MALIGNANT NEOPLASM OF BODY OF PANCREAS: Status: RESOLVED | Noted: 2018-01-29 | Resolved: 2018-04-24

## 2018-04-24 PROBLEM — N39.0 UTI (URINARY TRACT INFECTION): Status: RESOLVED | Noted: 2017-12-09 | Resolved: 2018-04-24

## 2018-04-24 PROBLEM — M79.89 INFLAMMATION OF SOFT TISSUE: Status: RESOLVED | Noted: 2018-01-29 | Resolved: 2018-04-24

## 2018-04-24 PROBLEM — K51.00 PANCOLITIS: Status: RESOLVED | Noted: 2018-01-13 | Resolved: 2018-04-24

## 2018-04-24 LAB
ALBUMIN SERPL BCP-MCNC: 2.8 G/DL
ALP SERPL-CCNC: 67 U/L
ALT SERPL W/O P-5'-P-CCNC: 13 U/L
ANION GAP SERPL CALC-SCNC: 10 MMOL/L
AST SERPL-CCNC: 22 U/L
BASOPHILS # BLD AUTO: 0.12 K/UL
BASOPHILS NFR BLD: 1 %
BILIRUB SERPL-MCNC: 0.2 MG/DL
BUN SERPL-MCNC: 24 MG/DL
CALCIUM SERPL-MCNC: 10.3 MG/DL
CHLORIDE SERPL-SCNC: 101 MMOL/L
CO2 SERPL-SCNC: 26 MMOL/L
CREAT SERPL-MCNC: 1.3 MG/DL
DIFFERENTIAL METHOD: ABNORMAL
EOSINOPHIL # BLD AUTO: 0.5 K/UL
EOSINOPHIL NFR BLD: 4.1 %
ERYTHROCYTE [DISTWIDTH] IN BLOOD BY AUTOMATED COUNT: 13.4 %
EST. GFR  (AFRICAN AMERICAN): 45.7 ML/MIN/1.73 M^2
EST. GFR  (NON AFRICAN AMERICAN): 39.7 ML/MIN/1.73 M^2
GLUCOSE SERPL-MCNC: 164 MG/DL
HCT VFR BLD AUTO: 37.4 %
HGB BLD-MCNC: 11.8 G/DL
IMM GRANULOCYTES # BLD AUTO: 0.35 K/UL
IMM GRANULOCYTES NFR BLD AUTO: 3.1 %
LYMPHOCYTES # BLD AUTO: 1.7 K/UL
LYMPHOCYTES NFR BLD: 15 %
MCH RBC QN AUTO: 30.8 PG
MCHC RBC AUTO-ENTMCNC: 31.6 G/DL
MCV RBC AUTO: 98 FL
MONOCYTES # BLD AUTO: 0.9 K/UL
MONOCYTES NFR BLD: 7.9 %
NEUTROPHILS # BLD AUTO: 7.9 K/UL
NEUTROPHILS NFR BLD: 68.9 %
NRBC BLD-RTO: 0 /100 WBC
PLATELET # BLD AUTO: 904 K/UL
PMV BLD AUTO: 9.5 FL
POTASSIUM SERPL-SCNC: 4.5 MMOL/L
PREALB SERPL-MCNC: 19 MG/DL
PROT SERPL-MCNC: 7.3 G/DL
RBC # BLD AUTO: 3.83 M/UL
SODIUM SERPL-SCNC: 137 MMOL/L
WBC # BLD AUTO: 11.46 K/UL

## 2018-04-24 PROCEDURE — 99024 POSTOP FOLLOW-UP VISIT: CPT | Mod: S$GLB,,, | Performed by: NURSE PRACTITIONER

## 2018-04-24 PROCEDURE — 85025 COMPLETE CBC W/AUTO DIFF WBC: CPT

## 2018-04-24 PROCEDURE — 80053 COMPREHEN METABOLIC PANEL: CPT

## 2018-04-24 PROCEDURE — G0009 ADMIN PNEUMOCOCCAL VACCINE: HCPCS | Mod: S$GLB,,, | Performed by: INTERNAL MEDICINE

## 2018-04-24 PROCEDURE — 90732 PPSV23 VACC 2 YRS+ SUBQ/IM: CPT | Mod: S$GLB,,, | Performed by: INTERNAL MEDICINE

## 2018-04-24 PROCEDURE — 36415 COLL VENOUS BLD VENIPUNCTURE: CPT

## 2018-04-24 PROCEDURE — 99999 PR PBB SHADOW E&M-EST. PATIENT-LVL IV: CPT | Mod: PBBFAC,,, | Performed by: NURSE PRACTITIONER

## 2018-04-24 PROCEDURE — 84134 ASSAY OF PREALBUMIN: CPT

## 2018-04-24 RX ORDER — METFORMIN HYDROCHLORIDE 500 MG/1
500 TABLET ORAL 2 TIMES DAILY WITH MEALS
Status: ON HOLD | COMMUNITY
End: 2021-12-28 | Stop reason: SDUPTHER

## 2018-04-24 NOTE — PROGRESS NOTES
PO visit #1, S/P distal panc for PNET.  Doing quite well.  Deconditioned prior to surgery 2/2 sepsis and needed to participate in prehab.  Eating ok, but no appetite. Nauseated and vomited once yesterday but slept well last night and felt much better this morning.  Felt fatigued on Sunday but prior to that was making good progress.  No pain but has still been taking narcotic meds once or twice a day.  BMs are on the constipated side but improving.    Sleeps 12 + hrs per day.    PE:  Looks very good.  BBS cl  HRRR  No edema.  Abd is soft, ND, NT   Midline inc c/d/i.  No r/s/d    PLAN:  Present at [facility] to discuss path/? Additional tx and surveillance schedule.  Encouraged small but frequent high protein meals and supplements.  Encouraged progressive daily TID exercise.    Advised no additional pain med and to switch to OTC options.   FU 2 weeks.

## 2018-04-30 ENCOUNTER — TELEPHONE (OUTPATIENT)
Dept: ADMINISTRATIVE | Facility: CLINIC | Age: 78
End: 2018-04-30

## 2018-05-01 ENCOUNTER — DOCUMENTATION ONLY (OUTPATIENT)
Dept: HEMATOLOGY/ONCOLOGY | Facility: CLINIC | Age: 78
End: 2018-05-01

## 2018-05-01 NOTE — PROGRESS NOTES
[facility]    Upper GI Tumor Board Note      Date: 04/30/2018  MRN: [id number]  [place] [facility]  Presenter: Aga Grady NP    Summary: PNET    Recommendations: Refer patient to neuroendocrine clinic    Tumor:  Node(s):  Metastasis:      [facility] Treatment Guidelines reviewed and care plan is consistent with guidelines, i.e., NCCN, NCL, PDQ, ASCO, AUA, etc.    Presentation at Cancer Conference: Upper GI Multidisciplinary Tumor Board    Discussed possible entry into Clinical Trial: NO  Physician Name: Sruthi Arenas RN

## 2018-05-02 DIAGNOSIS — C7A.8 NEUROENDOCRINE CARCINOMA OF PANCREAS: Primary | ICD-10-CM

## 2018-05-04 ENCOUNTER — TELEPHONE (OUTPATIENT)
Dept: NEUROLOGY | Facility: HOSPITAL | Age: 78
End: 2018-05-04

## 2018-05-04 NOTE — TELEPHONE ENCOUNTER
----- Message from Sruthi Arenas RN sent at 5/2/2018  3:48 PM CDT -----    The above pt needs to be seen in neuroendocrine.    Thanks,  Sruthi

## 2018-05-07 ENCOUNTER — OFFICE VISIT (OUTPATIENT)
Dept: SURGERY | Facility: CLINIC | Age: 78
End: 2018-05-07
Payer: MEDICARE

## 2018-05-07 VITALS
HEART RATE: 69 BPM | DIASTOLIC BLOOD PRESSURE: 69 MMHG | WEIGHT: 123 LBS | BODY MASS INDEX: 20.47 KG/M2 | TEMPERATURE: 98 F | SYSTOLIC BLOOD PRESSURE: 146 MMHG

## 2018-05-07 DIAGNOSIS — R63.4 WEIGHT LOSS, UNINTENTIONAL: ICD-10-CM

## 2018-05-07 DIAGNOSIS — Z09 SURGERY FOLLOW-UP: Primary | ICD-10-CM

## 2018-05-07 PROCEDURE — 99999 PR PBB SHADOW E&M-EST. PATIENT-LVL III: CPT | Mod: PBBFAC,,, | Performed by: NURSE PRACTITIONER

## 2018-05-07 PROCEDURE — 99024 POSTOP FOLLOW-UP VISIT: CPT | Mod: S$GLB,,, | Performed by: NURSE PRACTITIONER

## 2018-05-07 RX ORDER — MEGESTROL ACETATE 40 MG/ML
SUSPENSION ORAL
Status: CANCELLED | OUTPATIENT
Start: 2018-05-07 | End: 2019-05-07

## 2018-05-07 NOTE — PATIENT INSTRUCTIONS
MEGACE:    400 mg daily= 10 ml(40mg/ml) in suspension liquid.     Walk daily, get up and move when awake in morning.     Good to see you, you are recuperating nicely. Go back on protein supplement w creon and maintain Vit D3 ingestion.    Enjoy!    Lorene Mercado, DNP, APRN, AGNP-C

## 2018-05-07 NOTE — PROGRESS NOTES
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 She still has no appetite and has not been eating enough- has to force herself to eat but has some megace and will try it to see if it helps. She walked in a [employer] around the track three times recently and has some days where she feels great and others where she is very tired. That is par for the course and I reassured her she is doing well and to ensure she is ingesting protein to rebuild tissue and force herself to eat some food, try the shakes again. She has lost 2 lbs in each of the last three successive post op visits. I will see her back in 3 weeks for a weight check but gave her some encouragement and advice re: improving fatigue. She is looking considerably better from the time we first met!    We discussed the following pathology and she should follow up w her medical oncologist for surveillance.       PATHO:      PHYSICAL EXAM:  Physical Exam   Constitutional: She appears well-developed. She is cooperative. She does not appear ill. No distress.   Good color and energy levels, smiling and relaxed   Cardiovascular: Normal rate and regular rhythm.    Murmur heard.  Pulses:       Radial pulses are 2+ on the right side, and 2+ on the left side.   No edema   Pulmonary/Chest: Effort normal. She has decreased breath sounds in the right lower field and the left lower field.   Abdominal: Soft. Bowel sounds are normal. There is no hepatosplenomegaly. There is no tenderness. No hernia.       Neurological: She is alert.   Psychiatric: She has a normal mood and affect. Her speech is normal and behavior is normal. Judgment and thought content normal. Cognition and memory are normal.   Good mood, not depressed anymore       ASSESSMENT:    PLAN:    1. Increase activity, time food for after  being active  2. RTC in 3 weeks for weight check, try prehab interventions in post op period for weight maintenance  3. Return to Dr. Villarreal and should see neuroendocrine clinic

## 2018-05-08 ENCOUNTER — TELEPHONE (OUTPATIENT)
Dept: NEUROLOGY | Facility: HOSPITAL | Age: 78
End: 2018-05-08

## 2018-05-08 NOTE — TELEPHONE ENCOUNTER
Received a return call from pt and .  Spoke with pts .  He says her doctor referred her to [facility] for treatment and they would like to go there since it is closer.  Explained that we specialize in NET here at [facility]. They will discuss it and call us back if they want to proceed.

## 2018-05-08 NOTE — TELEPHONE ENCOUNTER
----- Message from Zuleika Colon sent at 5/4/2018  1:13 PM CDT -----  New- Patient is returning phone call.

## 2018-05-14 ENCOUNTER — TELEPHONE (OUTPATIENT)
Dept: NEUROLOGY | Facility: HOSPITAL | Age: 78
End: 2018-05-14

## 2018-05-14 NOTE — TELEPHONE ENCOUNTER
Left message for patient to return call on voice mail.  stepped out and will call back tomorrow.

## 2018-05-14 NOTE — TELEPHONE ENCOUNTER
----- Message from Hannah Wilson sent at 5/8/2018  4:18 PM CDT -----  Regarding: New Patient Scheduling  Contact: [phone]  Ms. Aiken's  called and stated he would like to proceed in making an appointment.  He can be reached at [phone]. Thank you  abc

## 2018-05-14 NOTE — TELEPHONE ENCOUNTER
----- Message from Marisol Mora sent at 5/14/2018 11:40 AM CDT -----  Regarding: NEW  Contact: [phone]  NEW-Patient is returning a call to the nurse.  Call back number is [phone]

## 2018-05-23 ENCOUNTER — TELEPHONE (OUTPATIENT)
Dept: NEUROLOGY | Facility: HOSPITAL | Age: 78
End: 2018-05-23

## 2018-05-23 DIAGNOSIS — C7A.094 MALIGNANT CARCINOID TUMOR OF FOREGUT: ICD-10-CM

## 2018-05-23 DIAGNOSIS — D3A.8 PRIMARY PANCREATIC NEUROENDOCRINE TUMOR: Primary | ICD-10-CM

## 2018-05-23 LAB
EXT 24 HR UR METANEPHRINE: ABNORMAL
EXT 24 HR UR NORMETANEPHRINE: ABNORMAL
EXT 24 HR UR NORMETANEPHRINE: ABNORMAL
EXT 25 HYDROXY VIT D2: ABNORMAL
EXT 25 HYDROXY VIT D3: ABNORMAL
EXT 5 HIAA 24 HR URINE: ABNORMAL
EXT 5 HIAA BLOOD: ABNORMAL
EXT ACTH: ABNORMAL
EXT AFP: ABNORMAL
EXT ALBUMIN: ABNORMAL
EXT ALKALINE PHOSPHATASE: ABNORMAL
EXT ALT: ABNORMAL
EXT AMYLASE: ABNORMAL
EXT ANTI ISLET CELL AB: ABNORMAL
EXT ANTI PARIETAL CELL AB: ABNORMAL
EXT ANTI THYROID AB: ABNORMAL
EXT AST: ABNORMAL
EXT BILIRUBIN DIRECT: ABNORMAL MG/DL
EXT BILIRUBIN TOTAL: ABNORMAL
EXT BK VIRUS DNA QN PCR: ABNORMAL
EXT BUN: ABNORMAL
EXT C PEPTIDE: 4.3 NG/ML (ref 1.1–4.4)
EXT CA 125: ABNORMAL
EXT CA 19-9: ABNORMAL
EXT CA 27-29: ABNORMAL
EXT CALCITONIN: ABNORMAL
EXT CALCIUM: ABNORMAL
EXT CEA: ABNORMAL
EXT CHLORIDE: ABNORMAL
EXT CHOLESTEROL: ABNORMAL
EXT CHROMOGRANIN A: ABNORMAL
EXT CO2: ABNORMAL
EXT CREATININE UA: ABNORMAL
EXT CREATININE: ABNORMAL MG/DL
EXT CYCLOSPORONE LEVEL: ABNORMAL
EXT DOPAMINE: ABNORMAL
EXT EBV DNA BY PCR: ABNORMAL
EXT EPINEPHRINE: ABNORMAL
EXT FOLATE: ABNORMAL
EXT FREE T3: ABNORMAL
EXT FREE T4: ABNORMAL
EXT FSH: ABNORMAL
EXT GASTRIN RELEASING PEPTIDE: ABNORMAL
EXT GASTRIN RELEASING PEPTIDE: ABNORMAL
EXT GASTRIN: 101 PG/ML (ref 0–115)
EXT GGT: ABNORMAL
EXT GHRELIN: 762 PG/ML (ref 520–700)
EXT GLUCAGON: 87 PG/ML (ref 50–150)
EXT GLUCOSE: ABNORMAL
EXT GROWTH HORMONE: ABNORMAL
EXT HCV RNA QUANT PCR: ABNORMAL
EXT HDL: ABNORMAL
EXT HEMATOCRIT: ABNORMAL
EXT HEMOGLOBIN A1C: ABNORMAL
EXT HEMOGLOBIN: ABNORMAL
EXT HISTAMINE 24 HR URINE: ABNORMAL
EXT HISTAMINE: ABNORMAL
EXT IGF-1: ABNORMAL
EXT IMMUNKNOW (NON-STIMULATED): ABNORMAL
EXT IMMUNKNOW (STIMULATED): ABNORMAL
EXT INR: ABNORMAL
EXT INSULIN: 11.9 UIU/ML (ref 2.6–24.9)
EXT LANREOTIDE LEVEL: ABNORMAL
EXT LDH, TOTAL: ABNORMAL
EXT LDL CHOLESTEROL: ABNORMAL
EXT LIPASE: ABNORMAL
EXT MAGNESIUM: ABNORMAL
EXT METANEPHRINE FREE PLASMA: ABNORMAL
EXT MOTILIN: ABNORMAL
EXT NEUROKININ A CAMB: ABNORMAL
EXT NEUROKININ A ISI: ABNORMAL
EXT NEUROTENSIN: ABNORMAL
EXT NOREPINEPHRINE: ABNORMAL
EXT NORMETANEPHRINE: ABNORMAL
EXT NSE: ABNORMAL
EXT OCTREOTIDE LEVEL: ABNORMAL
EXT PANCREASTATIN CAMB: ABNORMAL
EXT PANCREASTATIN ISI: 79 PG/ML (ref 10–135)
EXT PANCREATIC POLYPEPTIDE: ABNORMAL
EXT PHOSPHORUS: ABNORMAL
EXT PLATELETS: ABNORMAL
EXT POTASSIUM: ABNORMAL
EXT PROGRAF LEVEL: ABNORMAL
EXT PROLACTIN: ABNORMAL
EXT PROTEIN TOTAL: ABNORMAL
EXT PROTEIN UA: ABNORMAL
EXT PT: ABNORMAL
EXT PTH, INTACT: ABNORMAL
EXT PTT: ABNORMAL
EXT RAPAMUNE LEVEL: ABNORMAL
EXT SEROTONIN: 18 NG/ML (ref 0–420)
EXT SODIUM: ABNORMAL MMOL/L
EXT SOMATOSTATIN: ABNORMAL
EXT SUBSTANCE P: ABNORMAL
EXT TRIGLYCERIDES: ABNORMAL
EXT TRYPTASE: ABNORMAL
EXT TSH: ABNORMAL
EXT URIC ACID: ABNORMAL
EXT URINE AMYLASE U/HR: ABNORMAL
EXT URINE AMYLASE U/L: ABNORMAL
EXT VASOACTIVE INTESTINAL POLYPEPTIDE: ABNORMAL
EXT VITAMIN B12: ABNORMAL
EXT VMA 24 HR URINE: ABNORMAL
EXT WBC: ABNORMAL
NEURON SPECIFIC ENOLASE: ABNORMAL

## 2018-05-23 NOTE — TELEPHONE ENCOUNTER
Spoke with pt, sched ga 68 pet/ct scan and md pradeep.  She will call and sched CT at [place] and get tumor markers at [facility] in [place].  Lab orders faxed.

## 2018-05-23 NOTE — TELEPHONE ENCOUNTER
----- Message from [facility]ule[facility] Darlene sent at 5/23/2018  2:02 PM CDT -----  New patient has questions about the testing that was discussed today. Please call back to assist at [phone].

## 2018-05-23 NOTE — TELEPHONE ENCOUNTER
Pt sched ct at [place] on same day as Ga 68 pet/ct scan.  Instructed to move the ct scan to a different day.  They verbalized understanding.

## 2018-05-28 ENCOUNTER — OFFICE VISIT (OUTPATIENT)
Dept: SURGERY | Facility: CLINIC | Age: 78
End: 2018-05-28
Payer: MEDICARE

## 2018-05-28 VITALS — SYSTOLIC BLOOD PRESSURE: 154 MMHG | TEMPERATURE: 99 F | HEART RATE: 61 BPM | DIASTOLIC BLOOD PRESSURE: 71 MMHG

## 2018-05-28 DIAGNOSIS — Z09 SURGERY FOLLOW-UP: Primary | ICD-10-CM

## 2018-05-28 PROCEDURE — 99999 PR PBB SHADOW E&M-EST. PATIENT-LVL IV: CPT | Mod: PBBFAC,,, | Performed by: NURSE PRACTITIONER

## 2018-05-28 PROCEDURE — 99024 POSTOP FOLLOW-UP VISIT: CPT | Mod: S$GLB,,, | Performed by: NURSE PRACTITIONER

## 2018-05-28 RX ORDER — LOSARTAN POTASSIUM AND HYDROCHLOROTHIAZIDE 25; 100 MG/1; MG/1
TABLET ORAL
COMMUNITY
Start: 2018-04-30 | End: 2018-06-06

## 2018-05-28 RX ORDER — LANCETS
EACH MISCELLANEOUS
COMMUNITY
Start: 2018-04-02

## 2018-05-28 NOTE — PROGRESS NOTES
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 She has been on creon, protein and is able to eat again with no digestive issues. She looks great today and is feeling well. She has lost one pound but that can be explained by different clothing- warm day today. She says she stopped her creon for about 4 days and was OK.  She has seen oncology at [place] for a NE tumor and is following up there. She asked to come off creon and I advised OK, so long as she has no digestive issues and no unintentional weight loss. If she gets weak and loses weight again, my reccs are to get back on it.      PHYSICAL EXAM:  Physical Exam   Constitutional: She appears well-developed. She is cooperative. She does not appear ill. No distress.   Good color and energy levels, smiling and relaxed   Cardiovascular: Normal rate and regular rhythm.    Murmur heard.  Pulses:       Radial pulses are 2+ on the right side, and 2+ on the left side.   No edema   Pulmonary/Chest: Effort normal. She has decreased breath sounds in the right lower field and the left lower field.   Abdominal: Soft. Bowel sounds are normal. There is no hepatosplenomegaly. There is no tenderness. No hernia.       Neurological: She is alert.   Psychiatric: She has a normal mood and affect. Her speech is normal and behavior is normal. Judgment and thought content normal. Cognition and memory are normal.   Good mood, not depressed anymore     ASSESSMENT: Doing well    PLAN:    1. D/C from surgery

## 2018-06-04 ENCOUNTER — HOSPITAL ENCOUNTER (OUTPATIENT)
Dept: RADIOLOGY | Facility: HOSPITAL | Age: 78
Discharge: HOME | End: 2018-06-04
Attending: SURGERY
Payer: MEDICARE

## 2018-06-04 DIAGNOSIS — D3A.8 PRIMARY PANCREATIC NEUROENDOCRINE TUMOR: ICD-10-CM

## 2018-06-04 DIAGNOSIS — C7A.094 MALIGNANT CARCINOID TUMOR OF FOREGUT: ICD-10-CM

## 2018-06-04 LAB — POCT GLUCOSE: 118 MG/DL (ref 70–110)

## 2018-06-04 PROCEDURE — A9587 GALLIUM GA-68: HCPCS | Mod: TB

## 2018-06-04 PROCEDURE — 78815 PET IMAGE W/CT SKULL-THIGH: CPT | Mod: TC

## 2018-06-04 PROCEDURE — 78815 PET IMAGE W/CT SKULL-THIGH: CPT | Mod: 26,PI,, | Performed by: RADIOLOGY

## 2018-06-05 ENCOUNTER — HOSPITAL ENCOUNTER (OUTPATIENT)
Dept: RADIOLOGY | Facility: HOSPITAL | Age: 78
Discharge: HOME | End: 2018-06-05
Attending: SURGERY
Payer: MEDICARE

## 2018-06-05 DIAGNOSIS — D3A.8 PRIMARY PANCREATIC NEUROENDOCRINE TUMOR: ICD-10-CM

## 2018-06-05 DIAGNOSIS — I10 HTN (HYPERTENSION): ICD-10-CM

## 2018-06-05 DIAGNOSIS — E11.9 DIABETES: Primary | ICD-10-CM

## 2018-06-05 PROCEDURE — 25500020 PHARM REV CODE 255: Performed by: SURGERY

## 2018-06-05 PROCEDURE — 74177 CT ABD & PELVIS W/CONTRAST: CPT | Mod: TC

## 2018-06-05 PROCEDURE — 74177 CT ABD & PELVIS W/CONTRAST: CPT | Mod: 26,,, | Performed by: RADIOLOGY

## 2018-06-05 RX ADMIN — IOHEXOL 75 ML: 350 INJECTION, SOLUTION INTRAVENOUS at 09:06

## 2018-06-06 ENCOUNTER — OFFICE VISIT (OUTPATIENT)
Dept: NEUROLOGY | Facility: HOSPITAL | Age: 78
End: 2018-06-06
Attending: SURGERY
Payer: MEDICARE

## 2018-06-06 VITALS
TEMPERATURE: 98 F | HEIGHT: 65 IN | BODY MASS INDEX: 20.57 KG/M2 | WEIGHT: 123.44 LBS | HEART RATE: 60 BPM | SYSTOLIC BLOOD PRESSURE: 160 MMHG | DIASTOLIC BLOOD PRESSURE: 68 MMHG

## 2018-06-06 DIAGNOSIS — C25.4 MALIGNANT NEOPLASM OF ISLETS OF LANGERHANS: ICD-10-CM

## 2018-06-06 DIAGNOSIS — I10 ESSENTIAL HYPERTENSION: ICD-10-CM

## 2018-06-06 DIAGNOSIS — C77.2 SECONDARY AND UNSPECIFIED MALIGNANT NEOPLASM OF INTRA-ABDOMINAL LYMPH NODES: ICD-10-CM

## 2018-06-06 DIAGNOSIS — C25.2 PRIMARY MALIGNANT NEOPLASM OF TAIL OF PANCREAS: ICD-10-CM

## 2018-06-06 DIAGNOSIS — D3A.8 NEUROENDOCRINE TUMOR OF PANCREAS: Primary | ICD-10-CM

## 2018-06-06 DIAGNOSIS — D49.89 NEOPLASM OF ABDOMEN: ICD-10-CM

## 2018-06-06 PROCEDURE — 99215 OFFICE O/P EST HI 40 MIN: CPT | Performed by: SURGERY

## 2018-06-06 NOTE — PROGRESS NOTES
"NOLANETS:  [facility]  A collaboration between [facility] and [facility]    PATIENT: Rosamaria Aiken  MRN: [id number]  DATE: 6/6/2018    Vitals:    06/06/18 0834   BP: (!) 160/68   Pulse: 60   Temp: 97.7 °F (36.5 °C)   TempSrc: Oral   Weight: 56 kg (123 lb 7.3 oz)   Height: 5' 5" (1.651 m)      Last 2 Weight Measurements:   Wt Readings from Last 2 Encounters:   06/06/18 56 kg (123 lb 7.3 oz)   05/28/18 (P) 55.6 kg (122 lb 9.2 oz)     BMI: Body mass index is 20.54 kg/m².    Karnofsky Score    Karnofsky Score:  100% - Normal, No Complaints, No Evidence of Disease       Diagnosis:   1. Neuroendocrine tumor of pancreas    2. Primary malignant neoplasm of tail of pancreas    3. Malignant neoplasm of islets of Langerhans    4. Essential hypertension    5. Secondary and unspecified malignant neoplasm of intra-abdominal lymph nodes    6. Neoplasm of abdomen      Interval History: Ms. 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 ---surgery in April 2018    Past Medical History:   Diagnosis Date    Diabetes     HTN (hypertension)     Pancreatic cancer     Primary pancreatic neuroendocrine tumor     Ki 67- 3-20%       Past Surgical History:   Procedure Laterality Date    BLADDER SUSPENSION      [facility]    HYSTERECTOMY         Review of patient's allergies indicates:   Allergen Reactions    Epinephrine      Neuroendocrine Tumor patient         Current Outpatient Prescriptions   Medication Sig Dispense Refill    ACCU-CHEK SOFTCLIX LANCETS Misc       amLODIPine (NORVASC) 10 MG tablet Take 10 mg by mouth once daily.      co-enzyme Q-10 30 mg capsule Take 200 mg by mouth 3 (three) times daily.      cyanocobalamin (VITAMIN B-12) 1000 MCG tablet Take 100 mcg by mouth once daily.      metFORMIN (GLUCOPHAGE) 500 MG tablet Take 500 mg " by mouth 2 (two) times daily with meals.      om-3-dha-epa-fish oil-vit D3 [phone] pc-uv-px-unit Cap Take 2,000 mg by mouth.      omeprazole (PRILOSEC) 20 MG capsule Take 20 mg by mouth once daily.      ondansetron (ZOFRAN-ODT) 8 MG TbDL 8 mg every 12 (twelve) hours as needed.   0    oxybutynin (DITROPAN-XL) 10 MG 24 hr tablet Take 10 mg by mouth once daily.      VIT E ACET-MIN OIL-DIMETHICONE TOP       CREON 24,000-76,000 -120,000 unit capsule Take 2 capsules by mouth 3 (three) times daily with meals.   1     No current facility-administered medications for this visit.        Review of Systems     Number of Days per Week Number per Day   DIARRHEA 0    BRISTOL STOOL SCALE RATING     FLUSHING 0    WHEEZING 0      WEIGHT GAIN/LOSS 123 today-- stable since post op   ENERGY RATING (0-10) 10        Physical Exam: deferred.   DATE OF PROCEDURE:  04/06/2018      OPERATING SURGEON:  Lamonte Arriaga M.D.     ASSISTANT:  Jonathan Schoen, MD, MPH (RES)     PREOPERATIVE DIAGNOSIS:  Carcinoma of the body of the pancreas.     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     OPERATIVE PROCEDURES:  Distal pancreatectomy and splenectomy    Pathology Data:  FINAL PATHOLOGIC DIAGNOSIS  Distal pancreas and spleen, distal pancreatectomy is lumpectomy:  - Well-differentiated neuroendocrine tumor, WHO grade 2.  - See CAP synoptic report.  - See comment.  CAP Synoptic Report  Procedure: Distal pancreatectomy (pancreatic body/tail)  Tumor Site: Pancreatic body  Tumor Size: 2.5 cm in greatest dimension macroscopically  Tumor Focality: Unifocal  Histologic Type and Grade: G2, Well-differentiated neuroendocrine tumor  Ki-67 Labeling Index: 3% to 20%  Specify Ki-67 percentage: 4%  Tumor Necrosis: Not identified  Tumor Extension: Tumor is limited to the pancreas  Margins: All margins are uninvolved by tumor  Margins examined: pancreatic parenchymal  Regional Lymph Nodes  Number of Lymph Nodes Involved: 6  Number of  Lymph Nodes Examined: 28  Lymphovascular Invasion: Indeterminate  Perineural Invasion: Present  Pathologic Stage Classification (pTNM, AJCC 8th Edition): p T2 N1  Additional Pathologic Findings: Benign pancreatic cyst, favor mucinous nonneoplastic cyst  COMMENT: The pancreas shows a 2.5 cm mass. On microscopic examination, the tumor cells are nested with a  slightly uniform appearance. Immunostains show the tumor cells to be positive for synaptophysin, chromogranin,  and cytokeratin (AE1/AE3). There are no granules seen on PASD stain. This is consistent with a neuroendocrine  tumor. Ki-67 shows a proliferation index of 4% consistent with a well- differentiated neuroendocrine tumor, grade 2.  There is also a 3.5 cm cyst present. On microscopic examination, the cyst is lined with attenuated epithelium that  appears mucinous without significant atypia consistent with benign pancreatic cyst, favor mucinous nonneoplastic  cyst. The cyst is entirely submitted. Appropriate positive controls are examined.      Laboratory Data:  Neuroendocrine Labs Latest Ref Rng & Units 5/23/2018 5/7/2018   CA 19-9 2.0 - 40.0 U/mL     EXT GASTRIN 0 - 115 pg/ml 101    EXT SEROTONIN 0 - 420 ng/ml 18    EXT PANCREASTATIN KEN 10 - 135 pg/ml 79    EXT GLUCAGON 50 - 150 pg/ml 87    EXT C PEPTIDE 1.1 - 4.4 ng/ml 4.3    EXT INSULIN 2.6 - 24.9 uiu/ml 11.9    EXT GHRELIN 520 - 700 pg/ml 762 (A)    WBC 3.90 - 12.70 K/uL     HGB 12.0 - 16.0 g/dL     HCT 37.0 - 48.5 %     PLATLETS 150 - 350 K/uL     PTT 24.9 - 35.2 sec     GLUCOSE 70 - 110 mg/dL     BUN 8 - 23 mg/dL     CREATININE 0.5 - 1.4 mg/dL      - 145 mmol/L     K 3.5 - 5.1 mmol/L     CHLORIDE 95 - 110 mmol/L     CO2 23 - 29 mmol/L     CALCIUM 8.7 - 10.5 mg/dL     PROTEIN, TOTAL 6.0 - 8.4 g/dL     PHOSPHORUS 2.7 - 4.5 mg/dL     ALBUMIN 3.5 - 5.2 g/dL     URIC ACID 2.50 - 6.20 mg/dL     TOTAL BILIRUBIN 0.1 - 1.0 mg/dL     ALK PHOSPHATASE 55 - 135 U/L     SGOT (AST) 10 - 40 U/L     SGPT (ALT)  10 - 44 U/L      - 618 U/L     MG 1.6 - 2.6 mg/dL     HEMOGLOBIN A1C 4.0 - 5.6 %     Weight   123 lbs   Sedimentation Rate, Manual 0 - 30 mm/Hr       Neuroendocrine Labs Latest Ref Rng & Units 4/24/2018   CA 19-9 2.0 - 40.0 U/mL    EXT GASTRIN 0 - 115 pg/ml    EXT SEROTONIN 0 - 420 ng/ml    EXT PANCREASTATIN KEN 10 - 135 pg/ml    EXT GLUCAGON 50 - 150 pg/ml    EXT C PEPTIDE 1.1 - 4.4 ng/ml    EXT INSULIN 2.6 - 24.9 uiu/ml    EXT GHRELIN 520 - 700 pg/ml    WBC 3.90 - 12.70 K/uL 11.46   HGB 12.0 - 16.0 g/dL 11.8 (L)   HCT 37.0 - 48.5 % 37.4   PLATLETS 150 - 350 K/uL 904 (H)   PTT 24.9 - 35.2 sec    GLUCOSE 70 - 110 mg/dL 164 (H)   BUN 8 - 23 mg/dL 24 (H)   CREATININE 0.5 - 1.4 mg/dL 1.3    - 145 mmol/L 137   K 3.5 - 5.1 mmol/L 4.5   CHLORIDE 95 - 110 mmol/L 101   CO2 23 - 29 mmol/L 26   CALCIUM 8.7 - 10.5 mg/dL 10.3   PROTEIN, TOTAL 6.0 - 8.4 g/dL 7.3   PHOSPHORUS 2.7 - 4.5 mg/dL    ALBUMIN 3.5 - 5.2 g/dL 2.8 (L)   URIC ACID 2.50 - 6.20 mg/dL    TOTAL BILIRUBIN 0.1 - 1.0 mg/dL 0.2   ALK PHOSPHATASE 55 - 135 U/L 67   SGOT (AST) 10 - 40 U/L 22   SGPT (ALT) 10 - 44 U/L 13    - 618 U/L    MG 1.6 - 2.6 mg/dL    HEMOGLOBIN A1C 4.0 - 5.6 %    Weight     Sedimentation Rate, Manual 0 - 30 mm/Hr          Radiology Data:  FINDINGS:  Patient was administered 4.995 millicuries of gallium 68 octreotide intravenously.  Patient was administered 4.995 millicuries of gallium 68 octreotide intravenously.  There is physiologic intracranial, head, and neck activity.  Heart and mediastinum show nothing unusual.  There is physiologic liver, spleen, adrenal, pancreas, and bowel activity.  There is postoperative change.  Pelvic organs show nothing unusual.  No bone or lung lesions seen.   Impression       See above    No evidence of somatostatin receptor positive neoplasm.     FINDINGS:  The lung bases are clear.  The base of the heart appears normal.  Calcified atheromatous disease affects the aorta and its branch  vessels.    No radiopaque gallstone is seen.  No intrahepatic or extrahepatic biliary ductal dilatation is identified.  The liver, adrenal glands and kidneys appear normal.  Both kidneys concentrate and excrete contrast material appropriately into nondilated collecting systems.  There are bilateral renal cysts.  The urinary bladder is well distended and appears normal.    Postoperative changes of splenectomy and distal pancreatectomy are identified.  At the margin of the residual pancreatic tissue, there is a thick walled fluid collection that measures 2.5 x 2.0 cm, similar in appearance on recent PET-CT.  This could simply represent postoperative change.    Constipation.  No free air, free fluid or obstruction is identified.  No pathologically enlarged abdominal or pelvic lymph nodes are seen.    Age-appropriate degenerative changes affect skeleton.  There is right-sided sacroiliitis.   Impression       Postoperative changes of distal pancreatectomy and splenectomy are identified.  There is a thick-walled collection of fluid at the margin of the pancreatic resection which could simply represent a postoperative fluid collection rather than residual tumor as no abnormal activity was seen in this region on recent PET-CT.  This can be followed on future examinations.    Constipation.    Bilateral renal cysts             Impression:  1.  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 Polypep 8150    Somatostatin    Pancreatic Polypeptide    C-peptide    5-HIAA Plasma (Neuroendocrine)       Minutes Face-to-Face; Counseling/Coordination of Care > 50 percent of Visit     Severo Orona MD, FACS  The Mike Hernandez Professor of Surgery, [facility]  [address], [address]  [place]  [address]  Cell [phone]  ph. [phone]; [phone]  fax. [phone]  [email or web address]

## 2018-06-06 NOTE — LETTER
June 6, 2018        Tyrone Stahl MD  [address] [facility] [address]       NOLANETS: [facility]  A collaboration between [facility] and [facility] [address]  [address]  [place] [address]  Phone: [phone]  Fax: [phone]        CASSIDY Lu M.D., FACS  Efren Acharya M.D.  Brad Quiles M.D.   Donald James M.D., FACS  DO Severo Branch M.D., FACS   Patient: Rosamaria Aiken   MR Number: [id number]   YOB: 1940   Date of Visit: 6/6/2018     Dear Dr. Stahl    Thank you for the kind referral of Rosamaria Aiken. We saw this patient on 6/6/2018, and a copy of our clinic note is enclosed. We certainly appreciate the opportunity to participate in your patient's care.     If you have any questions or wish to discuss your patient further, please do not hesitate to contact us at [phone].       Kindest personal regards,          Severo Orona MD, FACS  The Mike Hernandez Professor of Surgery & Neurosciences  [facility], Dept. Of Surgery  [address] [place] [address], [address]  [place] [address] [phone]

## 2018-06-06 NOTE — PATIENT INSTRUCTIONS
Blood work / Lab work / Tumor markers every 3 mos.  Get lab work drawn at least 1 month prior to appointment. --- due August 2018 and November 2018    Scans:  CT Abd/Pelvis and MRI liver in 6 mos.  ---  Due in late Nov / early Dec  Call Scheduling Department at [phone] to schedule scans prior to next appointment:      Return clinic appointment in 6 months with Dr. Orona - appointment made

## 2018-07-18 NOTE — PROGRESS NOTES
Pt received the Pneumovax 23 vaccination. Pt tolerated the injection well. Pt left the unit in NAD.   No

## 2018-10-10 ENCOUNTER — TELEPHONE (OUTPATIENT)
Dept: NEUROLOGY | Facility: HOSPITAL | Age: 78
End: 2018-10-10

## 2018-10-10 NOTE — TELEPHONE ENCOUNTER
----- Message from Maricarmen Patel sent at 10/10/2018  8:02 AM CDT -----  Contact:   EAW- patients  called regarding labs and scans.  has questions regarding. Call back number [phone]

## 2018-10-10 NOTE — TELEPHONE ENCOUNTER
Returned 's Call. Advised him that patient's blood work needs to be done in November, and that scans are also due in November. Patient and spouse verbalized their understanding and has no further questions at this time.

## 2018-10-31 ENCOUNTER — PATIENT MESSAGE (OUTPATIENT)
Dept: NEUROLOGY | Facility: HOSPITAL | Age: 78
End: 2018-10-31

## 2018-11-01 LAB
EXT 24 HR UR METANEPHRINE: ABNORMAL
EXT 24 HR UR NORMETANEPHRINE: ABNORMAL
EXT 24 HR UR NORMETANEPHRINE: ABNORMAL
EXT 25 HYDROXY VIT D2: ABNORMAL
EXT 25 HYDROXY VIT D3: ABNORMAL
EXT 5 HIAA 24 HR URINE: ABNORMAL
EXT 5 HIAA BLOOD: ABNORMAL
EXT ACTH: ABNORMAL
EXT AFP: ABNORMAL
EXT ALBUMIN: 3.8 G/DL (ref 3.6–5.1)
EXT ALKALINE PHOSPHATASE: 45 U/L (ref 33–130)
EXT ALT: 10 U/L (ref 6–29)
EXT AMYLASE: ABNORMAL
EXT ANTI ISLET CELL AB: ABNORMAL
EXT ANTI PARIETAL CELL AB: ABNORMAL
EXT ANTI THYROID AB: ABNORMAL
EXT AST: 13 U/L (ref 10–35)
EXT BILIRUBIN DIRECT: ABNORMAL
EXT BILIRUBIN TOTAL: 0.5 MG/DL (ref 0.2–1.2)
EXT BK VIRUS DNA QN PCR: ABNORMAL
EXT BUN: 28 MG/DL (ref 7–25)
EXT C PEPTIDE: 1.25 NG/ML (ref 0.8–3.85)
EXT CA 125: ABNORMAL
EXT CA 19-9: ABNORMAL
EXT CA 27-29: ABNORMAL
EXT CALCITONIN: ABNORMAL
EXT CALCIUM: 9.4 MG/DL (ref 8.6–10.4)
EXT CEA: ABNORMAL
EXT CHLORIDE: 106 MMOL/L (ref 98–110)
EXT CHOLESTEROL: ABNORMAL
EXT CHROMOGRANIN A: ABNORMAL
EXT CO2: 28 MMOL/L (ref 20–32)
EXT CREATININE UA: ABNORMAL
EXT CREATININE: 0.92 MG/DL (ref 0.6–0.93)
EXT CYCLOSPORONE LEVEL: ABNORMAL
EXT DOPAMINE: ABNORMAL
EXT EBV DNA BY PCR: ABNORMAL
EXT EPINEPHRINE: ABNORMAL
EXT FOLATE: ABNORMAL
EXT FREE T3: ABNORMAL
EXT FREE T4: ABNORMAL
EXT FSH: ABNORMAL
EXT GASTRIN RELEASING PEPTIDE: ABNORMAL
EXT GASTRIN RELEASING PEPTIDE: ABNORMAL
EXT GASTRIN: 75 PG/ML (ref 0–100)
EXT GGT: ABNORMAL
EXT GHRELIN: 436 PG/ML (ref 520–700)
EXT GLUCAGON: 34 PG/ML (ref 8–57)
EXT GLUCOSE: 110 MG/DL (ref 65–99)
EXT GROWTH HORMONE: ABNORMAL
EXT HCV RNA QUANT PCR: ABNORMAL
EXT HDL: ABNORMAL
EXT HEMATOCRIT: 36.5 % (ref 35–45)
EXT HEMOGLOBIN A1C: ABNORMAL
EXT HEMOGLOBIN: 12.2 G/DL (ref 11.7–15.5)
EXT HISTAMINE 24 HR URINE: ABNORMAL
EXT HISTAMINE: ABNORMAL
EXT IGF-1: ABNORMAL
EXT IMMUNKNOW (NON-STIMULATED): ABNORMAL
EXT IMMUNKNOW (STIMULATED): ABNORMAL
EXT INR: ABNORMAL
EXT INSULIN: 3.3 UIU/ML (ref 2–19.6)
EXT LANREOTIDE LEVEL: ABNORMAL
EXT LDH, TOTAL: ABNORMAL
EXT LDL CHOLESTEROL: ABNORMAL
EXT LIPASE: ABNORMAL
EXT MAGNESIUM: ABNORMAL
EXT METANEPHRINE FREE PLASMA: ABNORMAL
EXT MOTILIN: ABNORMAL
EXT NEUROKININ A CAMB: ABNORMAL
EXT NEUROKININ A ISI: ABNORMAL
EXT NEUROTENSIN: ABNORMAL
EXT NOREPINEPHRINE: ABNORMAL
EXT NORMETANEPHRINE: ABNORMAL
EXT NSE: ABNORMAL
EXT OCTREOTIDE LEVEL: ABNORMAL
EXT PANCREASTATIN CAMB: ABNORMAL
EXT PANCREASTATIN ISI: 85 PG/ML (ref 10–135)
EXT PANCREATIC POLYPEPTIDE: 298 PG/ML
EXT PHOSPHORUS: ABNORMAL
EXT PLATELETS: 247 1000/UL (ref 140–400)
EXT POTASSIUM: 4.3 MMOL/L (ref 3.5–5.3)
EXT PROGRAF LEVEL: ABNORMAL
EXT PROLACTIN: ABNORMAL
EXT PROTEIN TOTAL: 6.3 G/DL (ref 6.1–8.1)
EXT PROTEIN UA: ABNORMAL
EXT PT: ABNORMAL
EXT PTH, INTACT: ABNORMAL
EXT PTT: ABNORMAL
EXT RAPAMUNE LEVEL: ABNORMAL
EXT SEROTONIN: 56 NG/ML (ref 56–244)
EXT SODIUM: 142 MMOL/L (ref 135–145)
EXT SOMATOSTATIN: 29 PG/ML (ref 0–30)
EXT SUBSTANCE P: ABNORMAL
EXT TRIGLYCERIDES: ABNORMAL
EXT TRYPTASE: ABNORMAL
EXT TSH: ABNORMAL
EXT URIC ACID: ABNORMAL
EXT URINE AMYLASE U/HR: ABNORMAL
EXT URINE AMYLASE U/L: ABNORMAL
EXT VASOACTIVE INTESTINAL POLYPEPTIDE: <50 PG/ML (ref 0–75)
EXT VITAMIN B12: ABNORMAL
EXT VMA 24 HR URINE: ABNORMAL
EXT WBC: 7 1000/UL (ref 3.8–10.8)
NEURON SPECIFIC ENOLASE: ABNORMAL

## 2018-11-19 ENCOUNTER — HOSPITAL ENCOUNTER (OUTPATIENT)
Dept: RADIOLOGY | Facility: HOSPITAL | Age: 78
Discharge: HOME | End: 2018-11-19
Attending: SURGERY
Payer: MEDICARE

## 2018-11-19 DIAGNOSIS — C25.2 PRIMARY MALIGNANT NEOPLASM OF TAIL OF PANCREAS: ICD-10-CM

## 2018-11-19 DIAGNOSIS — I10 ESSENTIAL HYPERTENSION: ICD-10-CM

## 2018-11-19 DIAGNOSIS — E11.9 DIABETES: Primary | ICD-10-CM

## 2018-11-19 DIAGNOSIS — D3A.8 NEUROENDOCRINE TUMOR OF PANCREAS: ICD-10-CM

## 2018-11-19 DIAGNOSIS — C25.4 MALIGNANT NEOPLASM OF ISLETS OF LANGERHANS: ICD-10-CM

## 2018-11-19 DIAGNOSIS — D49.89 NEOPLASM OF ABDOMEN: ICD-10-CM

## 2018-11-19 DIAGNOSIS — I10 HTN (HYPERTENSION): ICD-10-CM

## 2018-11-19 DIAGNOSIS — C77.2 SECONDARY AND UNSPECIFIED MALIGNANT NEOPLASM OF INTRA-ABDOMINAL LYMPH NODES: ICD-10-CM

## 2018-11-19 PROCEDURE — 25500020 PHARM REV CODE 255: Performed by: SURGERY

## 2018-11-19 PROCEDURE — 74183 MRI ABD W/O CNTR FLWD CNTR: CPT | Mod: TC

## 2018-11-19 PROCEDURE — 74177 CT ABD & PELVIS W/CONTRAST: CPT | Mod: 26,,, | Performed by: RADIOLOGY

## 2018-11-19 PROCEDURE — A9585 GADOBUTROL INJECTION: HCPCS | Performed by: SURGERY

## 2018-11-19 PROCEDURE — 74177 CT ABD & PELVIS W/CONTRAST: CPT | Mod: TC

## 2018-11-19 PROCEDURE — 74183 MRI ABD W/O CNTR FLWD CNTR: CPT | Mod: 26,59,, | Performed by: RADIOLOGY

## 2018-11-19 RX ORDER — GADOBUTROL 604.72 MG/ML
5 INJECTION INTRAVENOUS
Status: COMPLETED | OUTPATIENT
Start: 2018-11-19 | End: 2018-11-19

## 2018-11-19 RX ADMIN — IOHEXOL 75 ML: 350 INJECTION, SOLUTION INTRAVENOUS at 11:11

## 2018-11-19 RX ADMIN — GADOBUTROL 5 ML: 604.72 INJECTION INTRAVENOUS at 09:11

## 2018-11-19 RX ADMIN — IOHEXOL 30 ML: 350 INJECTION, SOLUTION INTRAVENOUS at 11:11

## 2018-12-10 ENCOUNTER — OFFICE VISIT (OUTPATIENT)
Dept: NEUROLOGY | Facility: HOSPITAL | Age: 78
End: 2018-12-10
Attending: SURGERY
Payer: MEDICARE

## 2018-12-10 VITALS
WEIGHT: 126.63 LBS | TEMPERATURE: 98 F | BODY MASS INDEX: 21.1 KG/M2 | SYSTOLIC BLOOD PRESSURE: 158 MMHG | DIASTOLIC BLOOD PRESSURE: 67 MMHG | HEART RATE: 69 BPM | HEIGHT: 65 IN

## 2018-12-10 DIAGNOSIS — D49.89 NEOPLASM OF ABDOMEN: ICD-10-CM

## 2018-12-10 DIAGNOSIS — C25.4 MALIGNANT NEOPLASM OF ISLETS OF LANGERHANS: ICD-10-CM

## 2018-12-10 DIAGNOSIS — C25.2 MALIGNANT NEOPLASM OF TAIL OF PANCREAS: Primary | ICD-10-CM

## 2018-12-10 DIAGNOSIS — C77.2 SECONDARY AND UNSPECIFIED MALIGNANT NEOPLASM OF INTRA-ABDOMINAL LYMPH NODES: ICD-10-CM

## 2018-12-10 PROCEDURE — 99215 OFFICE O/P EST HI 40 MIN: CPT | Performed by: SURGERY

## 2018-12-10 NOTE — PATIENT INSTRUCTIONS
Blood work / Lab work / Tumor markers every 3 mos.  Get lab work drawn at least 1 month prior to appointment. --- due February 2019 and May 2019    Scans:  Gallium 68, CT Abd/Pelvis and MRI liver in 6 mos.  ---  Due in May 2019  Call Scheduling Department at [phone] to schedule scans prior to next appointment:      Return clinic appointment in 6 months with Dr. Orona - appointment made    Echo every year - due March 2019

## 2018-12-10 NOTE — PROGRESS NOTES
"NOLANETS:  [facility]  A collaboration between [facility] and [facility]    PATIENT: Rosamaria Aiken  MRN: [id number]  DATE: 12/10/2018    Vitals:    12/10/18 1036   BP: (!) 158/67   Pulse: 69   Temp: 98.3 °F (36.8 °C)   TempSrc: Oral   Weight: 57.5 kg (126 lb 10.5 oz)   Height: 5' 5" (1.651 m)      Last 2 Weight Measurements:   Wt Readings from Last 2 Encounters:   12/10/18 57.5 kg (126 lb 10.5 oz)   06/06/18 56 kg (123 lb 7.3 oz)     BMI: Body mass index is 21.08 kg/m².    Karnofsky Score    Karnofsky Score:  100% - Normal, No Complaints, No Evidence of Disease       Diagnosis:   1. Malignant neoplasm of tail of pancreas    2. Malignant neoplasm of islets of Langerhans    3. Secondary and unspecified malignant neoplasm of intra-abdominal lymph nodes    4. Neoplasm of abdomen      Interval History: Ms. Aiken returns to the office  In routine follow up of an islet cell net in the tail of the pancreas (islet cell)    Past Medical History:   Diagnosis Date    Diabetes     HTN (hypertension)     Pancreatic cancer     Primary pancreatic neuroendocrine tumor     Ki 67- 3-20%       Past Surgical History:   Procedure Laterality Date    BLADDER SUSPENSION      DISTAL PANCREATECTOMY  04/06/2018    [facility] Dr. Bolton    HYSTERECTOMY      PANCREATECTOMY-DISTAL N/A 4/6/2018    Performed by [facility] at [facility] OR 2ND FLR    SPLENECTOMY N/A 4/6/2018    Per[facility] at [facility] OR 2ND FLR    ULTRASOUND-ENDOSCOPIC-UPPER N/A 1/8/2018    Performed by Savana Luna MD at [facility] ENDO (2ND FLR)       Review of patient's allergies indicates:   Allergen Reactions    Epinephrine      Neuroendocrine Tumor patient         Current Outpatient Medications   Medication Sig Dispense Refill    ACCU-CHEK SOFTCLIX LANCETS Misc       amLODIPine (NORVASC) 10 MG tablet Take 10 mg by mouth once daily.      co-enzyme " Q-10 30 mg capsule Take 200 mg by mouth 3 (three) times daily.      cyanocobalamin (VITAMIN B-12) 1000 MCG tablet Take 100 mcg by mouth once daily.      metFORMIN (GLUCOPHAGE) 500 MG tablet Take 500 mg by mouth 2 (two) times daily with meals.      om-3-dha-epa-fish oil-vit D3 [phone] uk-wh-vq-unit Cap Take 2,000 mg by mouth.      omeprazole (PRILOSEC) 20 MG capsule Take 20 mg by mouth once daily.      ondansetron (ZOFRAN-ODT) 8 MG TbDL 8 mg every 12 (twelve) hours as needed.   0    oxybutynin (DITROPAN-XL) 10 MG 24 hr tablet Take 10 mg by mouth once daily.      VIT E ACET-MIN OIL-DIMETHICONE TOP       CREON 24,000-76,000 -120,000 unit capsule Take 2 capsules by mouth 3 (three) times daily with meals.   1     No current facility-administered medications for this visit.        Review of Systems     Number of Days per Week Number per Day   DIARRHEA 0    BRISTOL STOOL SCALE RATING     FLUSHING 0    WHEEZING 0      WEIGHT GAIN/LOSS Stable 126 today   ENERGY RATING (0-10) 10        Physical Exam: deferred.     Pathology Data:   no new tissue    Laboratory Data:  Neuroendocrine Labs Latest Ref Rng & Units 11/1/2018   CA 19-9 2.0 - 40.0 U/mL    EXT GASTRIN 0 - 100 pg/ml 75   EXT SEROTONIN 56 - 244 ng/ml 56   EXT PANCREASTATIN KEN 10 - 135 pg/ml 85   EXT GLUCAGON 8 - 57 pg/ml 34   EXT PANCREATIC POLYPEPTIDE pg/ml 298   EXT VIP 0 - 75 pg/ml <50   EXT C PEPTIDE 0.80 - 3.85 ng/ml 1.25   EXT INSULIN 2.0 - 19.6 uiu/ml 3.3   EXT SOMATOSTATIN 0 - 30 pg/ml 29   EXT GHRELIN 520 - 700 pg/ml    WBC 3.90 - 12.70 K/uL    EXT WBC 3.8 - 10.8 1000/ul 7.0   HGB 12.0 - 16.0 g/dL    EXT HGB 11.7 - 15.5 g/dl 12.2   HCT 37.0 - 48.5 %    EXT HCT 35.0 - 45.0 % 36.5   PLATLETS 150 - 350 K/uL    EXT PLATLETS 140 - 400 1000/ul 247   PTT 24.9 - 35.2 sec    GLUCOSE 70 - 110 mg/dL    EXT GLUCOSE 65 - 99 mg/dl 110 (A)   BUN 8 - 23 mg/dL    EXT BUN 7 - 25 mg/dl 28 (A)   CREATININE 0.5 - 1.4 mg/dL    EXT CREATININE 0.60 - 0.93 mg/dl 0.92     - 145 mmol/L    EXT  - 145 mmol/l 142   K 3.5 - 5.1 mmol/L    EXT K 3.5 - 5.3 mmol/l 4.3   CHLORIDE 95 - 110 mmol/L    EXT CHLORIDE 98 - 110 mmol/l 106   CO2 23 - 29 mmol/L    EXT CO2 20 - 32 mmol/l 28   CALCIUM 8.7 - 10.5 mg/dL    EXT CALCIUM 8.6 - 10.4 mg/dl 9.4   PROTEIN, TOTAL 6.0 - 8.4 g/dL    EXT PROTEIN, TOTAL 6.1 - 8.1 g/dl 6.3   PHOSPHORUS 2.7 - 4.5 mg/dL    ALBUMIN 3.5 - 5.2 g/dL    EXT ALBUMIN 3.6 - 5.1 g/dl 3.8   URIC ACID 2.50 - 6.20 mg/dL    TOTAL BILIRUBIN 0.1 - 1.0 mg/dL    EXT TOTAL BILIRUBIN 0.2 - 1.2 mg/dl 0.5   ALK PHOSPHATASE 55 - 135 U/L    EXT ALK PHOSPHATASE 33 - 130 u/l 45   SGOT (AST) 10 - 40 U/L    EXT SGOT (AST) 10 - 35 u/l 13   SGPT (ALT) 10 - 44 U/L    EXT ALT 6 - 29 u/l 10    - 618 U/L    MG 1.6 - 2.6 mg/dL    HEMOGLOBIN A1C 4.0 - 5.6 %    Weight     Sedimentation Rate, Manual 0 - 30 mm/Hr          Radiology Data:  Impression       Postoperative changes of distal pancreatectomy and splenectomy without evidence for residual or recurrent lesion.    Bilateral renal cysts.     FINDINGS:  The lung bases are clear.  Base of the heart appears normal.  Calcified atheromatous disease affects the aorta and its major branch vessels.    No radiopaque gallstones are seen.  No intrahepatic or extrahepatic biliary ductal dilatation is identified.  The liver appears normal.  Postoperative changes splenectomy and distal pancreatectomy are identified.  The previously noted thick-walled fluid collection at the margin of the residual pancreatic tissue is resolved.  No focal lesion is identified within the residual pancreatic tissue.  The adrenal glands appear normal.  Bilateral renal cysts are present.  No hydronephrosis or hydroureter is seen.  The urinary bladder is well distended and appears normal.    Constipation.  The appendix is normal.  No free air, free fluid or obstruction is identified.  No pathologically enlarged abdominal or pelvic lymph nodes are seen.  Few small  scattered retroperitoneal lymph nodes are seen, subcentimeter size and appearing similar to the prior examination.    Age-appropriate degenerative changes affect skeleton.  There is a Schmorl's node at the superior endplate of L2.  Right-sided sacroiliitis seen.      Impression       Postoperative changes of distal pancreatectomy and splenectomy.  The thick-walled fluid collection that was previously noted at the margin of the operative bed is resolved.  No focal lesion is seen.  No evidence for local or distant metastatic disease.    Constipation.    Bilateral renal cysts.             Impression:  1. HALLIE       Plan: restage in 6 months       Labs: Markers: 3 month intervals             Scans: 6 month intervals    Return to Clinic:6 month intervals    Orders placed this visit:   Orders Placed This Encounter   Procedures    CT Abdomen Pelvis With Contrast    MRI Abdomen W WO Contrast    NM PET Ga68 Dotatate Whole Body    Serotonin (Neuroendo)    Pancreastatin    Insulin, random    [facility]    Ghrelin, Total, plasma (Neuroendo)    Glucagon    Somatostatin    Pancreatic Polypeptide        25 Minutes Face-to-Face; Counseling/Coordination of Care > 50 percent of Visit     Severo Orona MD, FACS  The Mike Hernandez Professor of Surgery, [facility]  [address]  [place]  [address]  Cell [phone]  ph. [phone]; [phone]  fax. [phone]  [email or web address]

## 2018-12-10 NOTE — LETTER
December 10, 2018        Tyrone Stahl MD  [address] [facility] [address]       NOLANETS: [facility]  A collaboration between [facility] and [facility] [address]  [address]  [place] [address]  Phone: [phone]  Fax: [phone]        CASSIDY Lu M.D., FACS  Efren Acharya M.D.  Brad Quiles M.D.   Donald James M.D., FACS  DO Severo Branch M.D., FACS   Patient: Rosamaria Aiken   MR Number: [id number]   YOB: 1940   Date of Visit: 12/10/2018     Dear Dr. Stahl    Thank you for the kind referral of Rosamaria Aiken. We saw this patient on 12/10/2018, and a copy of our clinic note is enclosed. We certainly appreciate the opportunity to participate in your patient's care.     If you have any questions or wish to discuss your patient further, please do not hesitate to contact us at [phone].       Kindest personal regards,          Severo Orona MD, FACS  The Mike Hernandez Professor of Surgery & Neurosciences  [facility], Dept. Of Surgery  [address] [place] [address], [address]  [place] [address] [phone]

## 2019-04-04 RX ORDER — PANCRELIPASE 24000; 76000; 120000 [USP'U]/1; [USP'U]/1; [USP'U]/1
2 CAPSULE, DELAYED RELEASE PELLETS ORAL
Qty: 250 CAPSULE | Refills: 1 | OUTPATIENT
Start: 2019-04-04 | End: 2019-06-03

## 2019-05-01 LAB
EXT 24 HR UR METANEPHRINE: ABNORMAL
EXT 24 HR UR NORMETANEPHRINE: ABNORMAL
EXT 24 HR UR NORMETANEPHRINE: ABNORMAL
EXT 25 HYDROXY VIT D2: ABNORMAL
EXT 25 HYDROXY VIT D3: ABNORMAL
EXT 5 HIAA 24 HR URINE: ABNORMAL
EXT 5 HIAA BLOOD: ABNORMAL
EXT ACTH: ABNORMAL
EXT AFP: ABNORMAL
EXT ALBUMIN: ABNORMAL
EXT ALKALINE PHOSPHATASE: ABNORMAL
EXT ALT: ABNORMAL
EXT AMYLASE: ABNORMAL
EXT ANTI ISLET CELL AB: ABNORMAL
EXT ANTI PARIETAL CELL AB: ABNORMAL
EXT ANTI THYROID AB: ABNORMAL
EXT AST: ABNORMAL
EXT BILIRUBIN DIRECT: ABNORMAL MG/DL
EXT BILIRUBIN TOTAL: ABNORMAL
EXT BK VIRUS DNA QN PCR: ABNORMAL
EXT BUN: ABNORMAL
EXT C PEPTIDE: 1.73 NG/ML (ref 0.8–3.85)
EXT CA 125: ABNORMAL
EXT CA 19-9: ABNORMAL
EXT CA 27-29: ABNORMAL
EXT CALCITONIN: ABNORMAL
EXT CALCIUM: ABNORMAL
EXT CEA: ABNORMAL
EXT CHLORIDE: ABNORMAL
EXT CHOLESTEROL: ABNORMAL
EXT CHROMOGRANIN A: ABNORMAL
EXT CO2: ABNORMAL
EXT CREATININE UA: ABNORMAL
EXT CREATININE: ABNORMAL MG/DL
EXT CYCLOSPORONE LEVEL: ABNORMAL
EXT DOPAMINE: ABNORMAL
EXT EBV DNA BY PCR: ABNORMAL
EXT EPINEPHRINE: ABNORMAL
EXT FOLATE: ABNORMAL
EXT FREE T3: ABNORMAL
EXT FREE T4: ABNORMAL
EXT FSH: ABNORMAL
EXT GASTRIN RELEASING PEPTIDE: ABNORMAL
EXT GASTRIN RELEASING PEPTIDE: ABNORMAL
EXT GASTRIN: 133 PG/ML (ref 0–100)
EXT GGT: ABNORMAL
EXT GHRELIN: 695 PG/ML (ref 520–700)
EXT GLUCAGON: 29 PG/ML (ref 8–57)
EXT GLUCOSE: ABNORMAL
EXT GROWTH HORMONE: ABNORMAL
EXT HCV RNA QUANT PCR: ABNORMAL
EXT HDL: ABNORMAL
EXT HEMATOCRIT: 42.2 % (ref 35–45)
EXT HEMOGLOBIN A1C: ABNORMAL %
EXT HEMOGLOBIN: 14.1 G/DL (ref 11.7–15.5)
EXT HISTAMINE 24 HR URINE: ABNORMAL
EXT HISTAMINE: ABNORMAL
EXT IGF-1: ABNORMAL
EXT IMMUNKNOW (NON-STIMULATED): ABNORMAL
EXT IMMUNKNOW (STIMULATED): ABNORMAL
EXT INR: ABNORMAL
EXT INSULIN: 7.7 UIU/ML (ref 2–19.6)
EXT LANREOTIDE LEVEL: ABNORMAL
EXT LDH, TOTAL: ABNORMAL
EXT LDL CHOLESTEROL: ABNORMAL
EXT LIPASE: ABNORMAL
EXT MAGNESIUM: ABNORMAL
EXT METANEPHRINE FREE PLASMA: ABNORMAL
EXT MOTILIN: ABNORMAL
EXT NEUROKININ A CAMB: ABNORMAL
EXT NEUROKININ A ISI: ABNORMAL
EXT NEUROTENSIN: ABNORMAL
EXT NOREPINEPHRINE: ABNORMAL
EXT NORMETANEPHRINE: ABNORMAL
EXT NSE: ABNORMAL
EXT OCTREOTIDE LEVEL: ABNORMAL
EXT PANCREASTATIN CAMB: ABNORMAL
EXT PANCREASTATIN ISI: 83 PG/ML (ref 10–135)
EXT PANCREATIC POLYPEPTIDE: 390 PG/ML
EXT PHOSPHORUS: ABNORMAL
EXT PLATELETS: 366 1000/UL (ref 140–400)
EXT POTASSIUM: ABNORMAL
EXT PROGRAF LEVEL: ABNORMAL
EXT PROLACTIN: ABNORMAL
EXT PROTEIN TOTAL: ABNORMAL
EXT PROTEIN UA: ABNORMAL
EXT PT: ABNORMAL
EXT PTH, INTACT: ABNORMAL
EXT PTT: ABNORMAL
EXT RAPAMUNE LEVEL: ABNORMAL
EXT SEROTONIN: 27 NG/ML (ref 56–244)
EXT SODIUM: ABNORMAL MMOL/L
EXT SOMATOSTATIN: 22 PG/ML (ref 0–30)
EXT SUBSTANCE P: ABNORMAL
EXT TRIGLYCERIDES: ABNORMAL
EXT TRYPTASE: ABNORMAL
EXT TSH: ABNORMAL
EXT URIC ACID: ABNORMAL
EXT URINE AMYLASE U/HR: ABNORMAL
EXT URINE AMYLASE U/L: ABNORMAL
EXT VASOACTIVE INTESTINAL POLYPEPTIDE: <50 PG/ML (ref 0–75)
EXT VITAMIN B12: ABNORMAL
EXT VMA 24 HR URINE: ABNORMAL
EXT WBC: 9.1 1000/UL (ref 3.8–10.8)
NEURON SPECIFIC ENOLASE: ABNORMAL

## 2019-05-17 ENCOUNTER — HOSPITAL ENCOUNTER (OUTPATIENT)
Dept: RADIOLOGY | Facility: HOSPITAL | Age: 79
Discharge: HOME | End: 2019-05-17
Attending: SURGERY
Payer: MEDICARE

## 2019-05-17 DIAGNOSIS — D49.89 NEOPLASM OF ABDOMEN: ICD-10-CM

## 2019-05-17 DIAGNOSIS — C25.2 MALIGNANT NEOPLASM OF TAIL OF PANCREAS: ICD-10-CM

## 2019-05-17 DIAGNOSIS — C25.4 MALIGNANT NEOPLASM OF ISLETS OF LANGERHANS: ICD-10-CM

## 2019-05-17 DIAGNOSIS — C77.2 SECONDARY AND UNSPECIFIED MALIGNANT NEOPLASM OF INTRA-ABDOMINAL LYMPH NODES: ICD-10-CM

## 2019-05-17 LAB
CREAT SERPL-MCNC: 0.8 MG/DL (ref 0.5–1.4)
SAMPLE: NORMAL

## 2019-05-17 PROCEDURE — 25500020 PHARM REV CODE 255: Performed by: SURGERY

## 2019-05-17 PROCEDURE — 74183 MRI ABD W/O CNTR FLWD CNTR: CPT | Mod: TC

## 2019-05-17 PROCEDURE — A9585 GADOBUTROL INJECTION: HCPCS | Performed by: SURGERY

## 2019-05-17 PROCEDURE — 78815 NM PET 68GA DOTATATE WHOLE BODY: ICD-10-PCS | Mod: 26,PS,, | Performed by: RADIOLOGY

## 2019-05-17 PROCEDURE — 74177 CT ABDOMEN PELVIS WITH CONTRAST: ICD-10-PCS | Mod: 26,59,, | Performed by: RADIOLOGY

## 2019-05-17 PROCEDURE — 74183 MRI ABDOMEN W WO CONTRAST: ICD-10-PCS | Mod: 26,,, | Performed by: RADIOLOGY

## 2019-05-17 PROCEDURE — 78815 PET IMAGE W/CT SKULL-THIGH: CPT | Mod: TC,PS

## 2019-05-17 PROCEDURE — 78815 PET IMAGE W/CT SKULL-THIGH: CPT | Mod: 26,PS,, | Performed by: RADIOLOGY

## 2019-05-17 PROCEDURE — 74177 CT ABD & PELVIS W/CONTRAST: CPT | Mod: 26,59,, | Performed by: RADIOLOGY

## 2019-05-17 PROCEDURE — 74183 MRI ABD W/O CNTR FLWD CNTR: CPT | Mod: 26,,, | Performed by: RADIOLOGY

## 2019-05-17 PROCEDURE — 74177 CT ABD & PELVIS W/CONTRAST: CPT | Mod: TC

## 2019-05-17 PROCEDURE — A9587 GALLIUM GA-68: HCPCS | Mod: TB

## 2019-05-17 RX ORDER — GADOBUTROL 604.72 MG/ML
10 INJECTION INTRAVENOUS
Status: COMPLETED | OUTPATIENT
Start: 2019-05-17 | End: 2019-05-17

## 2019-05-17 RX ADMIN — GADOBUTROL 10 ML: 604.72 INJECTION INTRAVENOUS at 10:05

## 2019-05-17 RX ADMIN — IOHEXOL 30 ML: 350 INJECTION, SOLUTION INTRAVENOUS at 09:05

## 2019-05-17 RX ADMIN — IOHEXOL 75 ML: 350 INJECTION, SOLUTION INTRAVENOUS at 11:05

## 2019-06-10 ENCOUNTER — OFFICE VISIT (OUTPATIENT)
Dept: NEUROLOGY | Facility: HOSPITAL | Age: 79
End: 2019-06-10
Attending: SURGERY
Payer: MEDICARE

## 2019-06-10 VITALS
WEIGHT: 123.69 LBS | TEMPERATURE: 98 F | HEIGHT: 65 IN | SYSTOLIC BLOOD PRESSURE: 163 MMHG | DIASTOLIC BLOOD PRESSURE: 67 MMHG | BODY MASS INDEX: 20.61 KG/M2 | HEART RATE: 58 BPM

## 2019-06-10 DIAGNOSIS — E07.9 THYROID MASS: Primary | ICD-10-CM

## 2019-06-10 DIAGNOSIS — C25.4 MALIGNANT NEOPLASM OF ISLETS OF LANGERHANS: ICD-10-CM

## 2019-06-10 DIAGNOSIS — C77.2 SECONDARY AND UNSPECIFIED MALIGNANT NEOPLASM OF INTRA-ABDOMINAL LYMPH NODES: ICD-10-CM

## 2019-06-10 DIAGNOSIS — C25.2 MALIGNANT NEOPLASM OF TAIL OF PANCREAS: ICD-10-CM

## 2019-06-10 DIAGNOSIS — D49.89 NEOPLASM OF ABDOMEN: ICD-10-CM

## 2019-06-10 PROCEDURE — 99213 OFFICE O/P EST LOW 20 MIN: CPT | Performed by: SURGERY

## 2019-06-10 RX ORDER — ASPIRIN 81 MG/1
81 TABLET ORAL DAILY
COMMUNITY

## 2019-06-10 RX ORDER — VITAMIN E 268 MG
1 CAPSULE ORAL DAILY
COMMUNITY
Start: 2019-04-01

## 2019-06-10 NOTE — LETTER
June 25, 2019      Tyrone Stahl MD  [address] [facility] [address]       NOLANETS: [facility]  A collaboration between [facility] and [facility] [address]  [address]  [place] [address]  Phone: [phone]  Fax: [phone]        CASSIDY Lu M.D., FACS  Brad Quiles M.D.   Donald James M.D., FACS  DO Severo Branch M.D., FACS   Patient: Rosamaria Aiken   MR Number: [id number]   YOB: 1940   Date of Visit: 6/10/2019     Dear Dr. Stahl    Thank you for the kind referral of Rosamaria Aiken. We saw this patient on 6/10/2019, and a copy of our clinic note is enclosed. We certainly appreciate the opportunity to participate in your patient's care.     If you have any questions or wish to discuss your patient further, please do not hesitate to contact us at [phone].       Kindest personal regards,    Severo Orona MD, FACS  The Mike Hernandez Professor of Surgery & Neurosciences  [facility], Dept. Of Surgery  [address] [place] [address], [address]  [place] [address] [phone]

## 2019-06-10 NOTE — PATIENT INSTRUCTIONS
Blood work / Lab work / Tumor markers every 3 mos.  Get lab work drawn at least 1 month prior to appointment. --- due August 2019 and November 2019    Scans:  CT Abd/Pelvis and MRI liver in 6 mos.  ---  November 2019  Call Scheduling Department at [phone] to schedule scans prior to next appointment:      Return clinic appointment in 6 months with Dr. Orona - appointment made    Echo every year - due ASAP    EUS: needs to have done ASAP --- will have the GI dept call you to schedule for an appt to have the procedure done    Thyroid: Thyroid U/S with possible FNA --- will have the IR dept call you to schedule

## 2019-06-10 NOTE — PROGRESS NOTES
"NOLANETS:  [facility]  A collaboration between [facility] and [facility]    PATIENT: Rosamaria Aiken  MRN: [id number]  DATE: 6/10/2019    Vitals:    06/10/19 1023   BP: (!) 163/67   BP Location: Left arm   Pulse: (!) 58   Temp: 97.9 °F (36.6 °C)   TempSrc: Oral   Weight: 56.1 kg (123 lb 10.9 oz)   Height: 5' 5" (1.651 m)      Last 2 Weight Measurements:   Wt Readings from Last 2 Encounters:   06/10/19 56.1 kg (123 lb 10.9 oz)   12/10/18 57.5 kg (126 lb 10.5 oz)     BMI: Body mass index is 20.58 kg/m².    Karnofsky Score    Karnofsky Score:  100% - Normal, No Complaints, No Evidence of Disease       Diagnosis:   1. Malignant neoplasm of tail of pancreas    2. Malignant neoplasm of islets of Langerhans    3. Secondary and unspecified malignant neoplasm of intra-abdominal lymph nodes    4. Neoplasm of abdomen      Interval History: Ms. 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 Bolton    HYSTERECTOMY      PANCREATECTOMY-DISTAL N/A 4/6/2018    Performed by [facility]aga [facility] OR 2ND FLR    SPLENECTOMY N/A 4/6/2018    Per[facility] at [facility] OR 2ND FLR    ULTRASOUND-ENDOSCOPIC-UPPER N/A 1/8/2018    Performed by Savana Luna MD at [facility] ENDO (2ND FLR)       Review of patient's allergies indicates:   Allergen Reactions    Epinephrine      Neuroendocrine Tumor patient         Current Outpatient Medications   Medication Sig Dispense Refill    ACCU-CHEK SOFTCLIX LANCETS Misc       amLODIPine (NORVASC) 10 MG tablet Take 10 mg by mouth " once daily.      aspirin (ECOTRIN) 81 MG EC tablet Take 81 mg by mouth once daily.      cyanocobalamin (VITAMIN B-12) 1000 MCG tablet Take 100 mcg by mouth once daily.      metFORMIN (GLUCOPHAGE) 500 MG tablet Take 500 mg by mouth 2 (two) times daily with meals.      om-3-dha-epa-fish oil-vit D3 [phone] gd-mk-uf-unit Cap Take 2,000 mg by mouth.      omeprazole (PRILOSEC) 20 MG capsule Take 20 mg by mouth once daily.      ondansetron (ZOFRAN-ODT) 8 MG TbDL 8 mg every 12 (twelve) hours as needed.   0    oxybutynin (DITROPAN-XL) 10 MG 24 hr tablet Take 10 mg by mouth once daily.      POLYETHYLENE GLYCOL 3350 ORAL       VIT E ACET-MIN OIL-DIMETHICONE TOP       vitamin E 400 UNIT capsule Take by mouth.      co-enzyme Q-10 30 mg capsule Take 200 mg by mouth 3 (three) times daily.      CREON 24,000-76,000 -120,000 unit capsule Take 2 capsules by mouth 3 (three) times daily with meals.   1     No current facility-administered medications for this visit.        Review of Systems     Number of Days per Week Number per Day   DIARRHEA 0    BRISTOL STOOL SCALE RATING     FLUSHING 0    WHEEZING 0      WEIGHT GAIN/LOSS 123 stable   ENERGY RATING (0-10) 10        Physical Exam: deferred.     Pathology Data:   no new tissue      Laboratory Data:  Neuroendocrine Labs Latest Ref Rng & Units 5/1/2019 12/10/2018   EXT GASTRIN 0 - 100 pg/ml 133 (A)    EXT SEROTONIN 56 - 244 ng/ml 27 (A)    EXT PANCREASTATIN KEN 10 - 135 pg/ml 83    EXT GLUCAGON 8 - 57 pg/ml 29    EXT PANCREATIC POLYPEPTIDE pg/ml 390    EXT VIP 0 - 75 pg/ml <50    EXT C PEPTIDE 0.80 - 3.85 ng/ml 1.73    EXT INSULIN 2.0 - 19.6 uiu/ml 7.7    EXT SOMATOSTATIN 0 - 30 pg/ml 22    EXT GHRELIN 520 - 700 pg/ml 695      Neuroendocrine Labs Latest Ref Rng & Units 11/19/2018 11/1/2018   EXT GASTRIN 0 - 100 pg/ml  75   EXT SEROTONIN 56 - 244 ng/ml  56   EXT PANCREASTATIN KEN 10 - 135 pg/ml  85   EXT GLUCAGON 8 - 57 pg/ml  34   EXT PANCREATIC POLYPEPTIDE pg/ml  298    EXT VIP 0 - 75 pg/ml  <50   EXT C PEPTIDE 0.80 - 3.85 ng/ml  1.25   EXT INSULIN 2.0 - 19.6 uiu/ml  3.3   EXT SOMATOSTATIN 0 - 30 pg/ml  29   EXT GHRELIN 520 - 700 pg/ml  436 (A)     Neuroendocrine Labs Latest Ref Rng & Units 6/6/2018 6/5/2018   EXT GASTRIN 0 - 100 pg/ml     EXT SEROTONIN 56 - 244 ng/ml     EXT PANCREASTATIN KEN 10 - 135 pg/ml     EXT GLUCAGON 8 - 57 pg/ml     EXT PANCREATIC POLYPEPTIDE pg/ml     EXT VIP 0 - 75 pg/ml     EXT C PEPTIDE 0.80 - 3.85 ng/ml     EXT INSULIN 2.0 - 19.6 uiu/ml     EXT SOMATOSTATIN 0 - 30 pg/ml     EXT GHRELIN 520 - 700 pg/ml       Neuroendocrine Labs Latest Ref Rng & Units 5/23/2018   EXT GASTRIN 0 - 100 pg/ml 101   EXT SEROTONIN 56 - 244 ng/ml 18   EXT PANCREASTATIN KEN 10 - 135 pg/ml 79   EXT GLUCAGON 8 - 57 pg/ml 87   EXT PANCREATIC POLYPEPTIDE pg/ml    EXT VIP 0 - 75 pg/ml    EXT C PEPTIDE 0.80 - 3.85 ng/ml 4.3   EXT INSULIN 2.0 - 19.6 uiu/ml 11.9   EXT SOMATOSTATIN 0 - 30 pg/ml    EXT GHRELIN 520 - 700 pg/ml 762 (A)         Radiology Data:  FINDINGS:  Visualized lower chest is unremarkable.    No suspicious liver lesion.  The liver, gallbladder, and adrenals are unremarkable.    Pancreatic head is unremarkable.  Expected post treatment changes of distal pancreatectomy and splenectomy.    No adenopathy.    No bowel wall thickening or dilation. Unremarkable peritoneum.  No ascites.    Bilateral renal cysts.  The kidneys and ureters are unremarkable.  Urinary bladder is unremarkable.  Hysterectomy.    Body wall soft tissues are unremarkable.    No acute bone abnormality.      Impression       Since November 19, 2018, no significant change or new abnormality.  No CT evidence of recurrent or residual malignancy.                   FINDINGS:  Visualized lower chest is unremarkable.    No suspicious liver lesion.  The liver, gallbladder, and adrenals are unremarkable.    Pancreatic head is unremarkable.  Expected post treatment changes of distal pancreatectomy and  splenectomy.    No adenopathy.    Bilateral renal cysts.  Kidneys and visualized ureters are otherwise unremarkable.    Visible bowel is unremarkable.    Visible peritoneum is unremarkable.  No ascites.    Body wall soft tissues are unremarkable.    No acute bone abnormality.      Impression       Since MRI November 19, 2018, no significant change or new abnormality.  No MR evidence of recurrent or metastatic malignancy.               FINDINGS:  Quality of the study: Adequate.    Focal radiotracer uptake in a 0.8 cm soft tissue nodule inferior to the left lobe of the thyroid with SUV max of 3.36.  No additional abnormal radiotracer uptake.    Physiologic uptake of the tracer is seen within the pituitary, liver, spleen, kidneys, adrenal glands and bowel.    Incidental CT findings: Coronary atherosclerosis. Postoperative changes noted status post distal pancreatectomy and splenectomy. Stable bilateral renal cysts.      Impression:  1. HALLIE       Plan:restage in 6 month with ct, mri and labs-- needs yearly eus of pancreas   fna of thyroid with fna if needed       Labs: Markers: 3 month intervals             Scans: 6 month intervals    Scopes: 12 month intervals--eus only    Return to Clinic:6 month intervals    Orders placed this visit:   Orders Placed This Encounter   Procedures    CT Abdomen Pelvis With Contrast    MRI Abdomen W WO Contrast    Pancreastatin    Insulin, random    [facility]    Glucagon    Somatostatin    Pancreatic Polypeptide    Creatinine, serum    Creatinine, serum    Creatinine, serum    Comprehensive metabolic panel    CBC auto differential        25 est Minutes Face-to-Face; Counseling/Coordination of Care > 50 percent of Visit     Severo Orona MD, FACS  [employer], [facility]  [address], [address]  [place]  [address]  Cell [phone]  ph. [phone]; [phone]  fax.  [phone]  [email or web address]

## 2019-06-12 ENCOUNTER — TELEPHONE (OUTPATIENT)
Dept: NEUROLOGY | Facility: HOSPITAL | Age: 79
End: 2019-06-12

## 2019-06-12 ENCOUNTER — TELEPHONE (OUTPATIENT)
Dept: GASTROENTEROLOGY | Facility: CLINIC | Age: 79
End: 2019-06-12

## 2019-06-12 DIAGNOSIS — D3A.8 NEUROENDOCRINE TUMOR OF PANCREAS: Primary | ICD-10-CM

## 2019-06-12 NOTE — TELEPHONE ENCOUNTER
----- Message from Jessica Bowers MA sent at 6/11/2019  6:40 PM CDT -----  Regarding: EUS needed ASAP  Hi All-    Please call patient to schedule for an EUS to be done ASAP, here at [facility] as per Dr. Orona's recommendation from clinic on 6/10/19.    Please let me know if I can be of any additional assistance.    Thanks,  Jessica-

## 2019-06-12 NOTE — TELEPHONE ENCOUNTER
----- Message from Estefani Esparza sent at 6/12/2019  1:22 PM CDT -----  Contact: self / -[phone]  EAW: Patient is requesting a call back regarding, needs the orders for the test. Please advise

## 2019-06-17 ENCOUNTER — TELEPHONE (OUTPATIENT)
Dept: ENDOSCOPY | Facility: HOSPITAL | Age: 79
End: 2019-06-17

## 2019-06-17 NOTE — TELEPHONE ENCOUNTER
Spoke with patient about arrival time @ 0930.     NPO status reviewed: Patient may eat until 7:00pm.  After 7pm, pt may have CLEAR liquids ONLY until completely NPO at Midnight.    Medications: Do not take Insulin or oral diabetic medications the day of the procedure.    Take as prescribed: heart, seizure and blood pressure medication in the morning with a sip of water (less than an ounce).  Take any breathing medications and bring inhalers to hospital with you.     Leave all valuables and jewelry at home. Wear comfortable clothes to procedure to change into hospital gown.   You cannot drive for 24 hours after your procedure because you will receive sedation for your procedure to make you comfortable.    A ride must be provided at discharge.

## 2019-06-19 ENCOUNTER — ANESTHESIA EVENT (OUTPATIENT)
Dept: ENDOSCOPY | Facility: HOSPITAL | Age: 79
End: 2019-06-19
Payer: MEDICARE

## 2019-06-19 ENCOUNTER — HOSPITAL ENCOUNTER (OUTPATIENT)
Facility: HOSPITAL | Age: 79
Discharge: HOME | End: 2019-06-19
Attending: INTERNAL MEDICINE | Admitting: INTERNAL MEDICINE
Payer: MEDICARE

## 2019-06-19 ENCOUNTER — ANESTHESIA (OUTPATIENT)
Dept: ENDOSCOPY | Facility: HOSPITAL | Age: 79
End: 2019-06-19
Payer: MEDICARE

## 2019-06-19 ENCOUNTER — TELEPHONE (OUTPATIENT)
Dept: NEUROLOGY | Facility: HOSPITAL | Age: 79
End: 2019-06-19

## 2019-06-19 VITALS
HEART RATE: 64 BPM | SYSTOLIC BLOOD PRESSURE: 141 MMHG | WEIGHT: 123 LBS | HEIGHT: 65 IN | TEMPERATURE: 99 F | DIASTOLIC BLOOD PRESSURE: 66 MMHG | OXYGEN SATURATION: 98 % | RESPIRATION RATE: 20 BRPM | BODY MASS INDEX: 20.49 KG/M2

## 2019-06-19 DIAGNOSIS — K86.9 PANCREATIC LESION: ICD-10-CM

## 2019-06-19 DIAGNOSIS — K86.89 PANCREATIC MASS: Primary | ICD-10-CM

## 2019-06-19 DIAGNOSIS — C77.2 SECONDARY AND UNSPECIFIED MALIGNANT NEOPLASM OF INTRA-ABDOMINAL LYMPH NODES: ICD-10-CM

## 2019-06-19 DIAGNOSIS — C25.2 MALIGNANT NEOPLASM OF TAIL OF PANCREAS: Primary | ICD-10-CM

## 2019-06-19 LAB
GLUCOSE SERPL-MCNC: 133 MG/DL (ref 70–110)
POCT GLUCOSE: 133 MG/DL (ref 70–110)

## 2019-06-19 PROCEDURE — 43259 EGD US EXAM DUODENUM/JEJUNUM: CPT | Mod: ,,, | Performed by: INTERNAL MEDICINE

## 2019-06-19 PROCEDURE — 43259 PR ENDOSCOPIC ULTRASOUND EXAM: ICD-10-PCS | Mod: ,,, | Performed by: INTERNAL MEDICINE

## 2019-06-19 PROCEDURE — 63600175 PHARM REV CODE 636 W HCPCS: Performed by: NURSE ANESTHETIST, CERTIFIED REGISTERED

## 2019-06-19 PROCEDURE — 37000008 HC ANESTHESIA 1ST 15 MINUTES: Performed by: INTERNAL MEDICINE

## 2019-06-19 PROCEDURE — 25000003 PHARM REV CODE 250: Performed by: INTERNAL MEDICINE

## 2019-06-19 PROCEDURE — 37000009 HC ANESTHESIA EA ADD 15 MINS: Performed by: INTERNAL MEDICINE

## 2019-06-19 PROCEDURE — 43259 EGD US EXAM DUODENUM/JEJUNUM: CPT | Performed by: INTERNAL MEDICINE

## 2019-06-19 PROCEDURE — 25000003 PHARM REV CODE 250: Performed by: NURSE ANESTHETIST, CERTIFIED REGISTERED

## 2019-06-19 PROCEDURE — 82962 GLUCOSE BLOOD TEST: CPT | Performed by: INTERNAL MEDICINE

## 2019-06-19 RX ORDER — PROPOFOL 10 MG/ML
VIAL (ML) INTRAVENOUS
Status: DISCONTINUED | OUTPATIENT
Start: 2019-06-19 | End: 2019-06-19

## 2019-06-19 RX ORDER — ETOMIDATE 2 MG/ML
INJECTION INTRAVENOUS
Status: DISCONTINUED | OUTPATIENT
Start: 2019-06-19 | End: 2019-06-19

## 2019-06-19 RX ORDER — LIDOCAINE HCL/PF 100 MG/5ML
SYRINGE (ML) INTRAVENOUS
Status: DISCONTINUED | OUTPATIENT
Start: 2019-06-19 | End: 2019-06-19

## 2019-06-19 RX ORDER — SODIUM CHLORIDE 9 MG/ML
INJECTION, SOLUTION INTRAVENOUS CONTINUOUS
Status: DISCONTINUED | OUTPATIENT
Start: 2019-06-19 | End: 2019-06-19 | Stop reason: HOSPADM

## 2019-06-19 RX ORDER — MIRTAZAPINE 15 MG/1
15 TABLET, FILM COATED ORAL NIGHTLY
COMMUNITY
End: 2022-01-11

## 2019-06-19 RX ORDER — PROPOFOL 10 MG/ML
VIAL (ML) INTRAVENOUS CONTINUOUS PRN
Status: DISCONTINUED | OUTPATIENT
Start: 2019-06-19 | End: 2019-06-19

## 2019-06-19 RX ORDER — SODIUM CHLORIDE 0.9 % (FLUSH) 0.9 %
10 SYRINGE (ML) INJECTION
Status: DISCONTINUED | OUTPATIENT
Start: 2019-06-19 | End: 2019-06-19 | Stop reason: HOSPADM

## 2019-06-19 RX ADMIN — PROPOFOL 20 MG: 10 INJECTION, EMULSION INTRAVENOUS at 09:06

## 2019-06-19 RX ADMIN — ETOMIDATE 6 MG: 2 INJECTION, SOLUTION INTRAVENOUS at 09:06

## 2019-06-19 RX ADMIN — TOPICAL ANESTHETIC 1 EACH: 200 SPRAY DENTAL; PERIODONTAL at 09:06

## 2019-06-19 RX ADMIN — ETOMIDATE 4 MG: 2 INJECTION, SOLUTION INTRAVENOUS at 10:06

## 2019-06-19 RX ADMIN — LIDOCAINE HYDROCHLORIDE 60 MG: 20 INJECTION, SOLUTION INTRAVENOUS at 09:06

## 2019-06-19 RX ADMIN — PROPOFOL 10 MG: 10 INJECTION, EMULSION INTRAVENOUS at 10:06

## 2019-06-19 RX ADMIN — PROPOFOL 20 MG: 10 INJECTION, EMULSION INTRAVENOUS at 10:06

## 2019-06-19 RX ADMIN — SODIUM CHLORIDE: 0.9 INJECTION, SOLUTION INTRAVENOUS at 09:06

## 2019-06-19 RX ADMIN — PROPOFOL 100 MCG/KG/MIN: 10 INJECTION, EMULSION INTRAVENOUS at 09:06

## 2019-06-19 NOTE — TELEPHONE ENCOUNTER
Received call from admitting, spoke with pt. Pt under the impression that she was to have Echo today, not EUS. Discussed with pt that the was spoken to by the endoscopy department and given all of her instructions. She states she understood them, but must have misunderstood the test name. Advised pt that she did need the Echocardiogram, and we would call scheduling to arrange. Pt prefers 1st week of July. Scheduled July 2nd at 9am.

## 2019-06-19 NOTE — ANESTHESIA POSTPROCEDURE EVALUATION
Anesthesia Post Evaluation    Patient: Rosamaria Aiken    Procedure(s) Performed: Procedure(s) (LRB):  ULTRASOUND-ENDOSCOPIC-UPPER (N/A)    Final Anesthesia Type: MAC  Patient location during evaluation: GI PACU  Patient participation: Yes- Able to Participate  Level of consciousness: awake and alert and oriented  Post-procedure vital signs: reviewed and stable  Pain management: adequate  Airway patency: patent  PONV status at discharge: No PONV  Anesthetic complications: no      Cardiovascular status: stable, hemodynamically stable and blood pressure returned to baseline  Respiratory status: unassisted, spontaneous ventilation and room air  Hydration status: euvolemic  Follow-up not needed.          Vitals Value Taken Time   /79 6/19/2019  9:22 AM   Temp 36.7 °C (98 °F) 6/19/2019  9:21 AM   Pulse 81 6/19/2019  9:21 AM   Resp 20 6/19/2019  9:21 AM   SpO2 99 % 6/19/2019  9:21 AM         No case tracking events are documented in the log.      Pain/Rocco Score: No data recorded

## 2019-06-19 NOTE — [employer]OVATION PATIENT INSTRUCTIONS
Discharge Summary/Instructions after an Endoscopic Procedure  Patient Name: Rosamaria Aiken  Patient MRN: [id number]  Patient YOB: 1940 Wednesday, June 19, 2019  Savana Luna MD  RESTRICTIONS:  During your procedure today, you received medications for sedation.  These   medications may affect your judgment, balance and coordination.  Therefore,   for 24 hours, you have the following restrictions:   - DO NOT drive a car, operate machinery, make legal/financial decisions,   sign important papers or drink alcohol.    ACTIVITY:  Today: no heavy lifting, straining or running due to procedural   sedation/anesthesia.  The following day: return to full activity including work.  DIET:  Eat and drink normally unless instructed otherwise.     TREATMENT FOR COMMON SIDE EFFECTS:  - Mild abdominal pain, nausea, belching, bloating or excessive gas:  rest,   eat lightly and use a heating pad.  - Sore Throat: treat with throat lozenges and/or gargle with warm salt   water.  - Because air was used during the procedure, expelling large amounts of air   from your rectum or belching is normal.  - If a bowel prep was taken, you may not have a bowel movement for 1-3 days.    This is normal.  SYMPTOMS TO WATCH FOR AND REPORT TO YOUR PHYSICIAN:  1. Abdominal pain or bloating, other than gas cramps.  2. Chest pain.  3. Back pain.  4. Signs of infection such as: chills or fever occurring within 24 hours   after the procedure.  5. Rectal bleeding, which would show as bright red, maroon, or black stools.   (A tablespoon of blood from the rectum is not serious, especially if   hemorrhoids are present.)  6. Vomiting.  7. Weakness or dizziness.  GO DIRECTLY TO THE NEAREST EMERGENCY ROOM IF YOU HAVE ANY OF THE FOLLOWING:      Difficulty breathing              Chills and/or fever over 101 F   Persistent vomiting and/or vomiting blood   Severe abdominal pain   Severe chest pain   Black, tarry stools   Bleeding- more than one  tablespoon   Any other symptom or condition that you feel may need urgent attention  Your doctor recommends these additional instructions:  If any biopsies were taken, your doctors clinic will contact you in 1 to 2   weeks with any results.  - Discharge patient to home.   - Resume previous diet.   - Continue present medications.   - Return to referring physician.  For questions, problems or results please call your physician - Savana Luna MD at Work:  [phone].  EMERGENCY PHONE NUMBER: [phone],  LAB RESULTS: [phone]  IF A COMPLICATION OR EMERGENCY SITUATION ARISES AND YOU ARE UNABLE TO REACH   YOUR PHYSICIAN - GO DIRECTLY TO THE EMERGENCY ROOM.  Savana Luna MD  6/19/2019 10:44:12 AM  This report has been verified and signed electronically.  [employer]

## 2019-06-19 NOTE — PLAN OF CARE
Pt. Spoke with Dr.El Luna.Discharge instructions given and explained.Pt. Verbalizes understanding.

## 2019-06-19 NOTE — PLAN OF CARE
Past Medical History:   Diagnosis Date    Diabetes     HTN (hypertension)     Pancreatic cancer     Primary pancreatic neuroendocrine tumor     Ki 67- 3-20%     Admission process complete. Patient ready for procedure. Plan of care reviewed with patient. Preoperative fasting appropriate for procedure and sedation. Call light in reach and bed in lowest position.

## 2019-06-19 NOTE — TRANSFER OF CARE
"Anesthesia Transfer of Care Note    Patient: Rosamaria Aiken    Procedure(s) Performed: Procedure(s) (LRB):  ULTRASOUND-ENDOSCOPIC-UPPER (N/A)    Patient location: GI    Anesthesia Type: MAC    Transport from OR: Transported from OR on room air with adequate spontaneous ventilation    Post pain: adequate analgesia    Post assessment: no apparent anesthetic complications and tolerated procedure well    Post vital signs: stable    Level of consciousness: alert, awake and oriented    Nausea/Vomiting: no nausea/vomiting    Complications: none    Transfer of care protocol was followed      Last vitals:   Visit Vitals  BP (!) 184/79   Pulse 81   Temp 36.7 °C (98 °F) (Temporal)   Resp 20   Ht 5' 5" (1.651 m)   Wt 55.8 kg (123 lb)   SpO2 99%   Breastfeeding? No   BMI 20.47 kg/m²     "

## 2019-06-19 NOTE — H&P
History & Physical - Short Stay  Gastroenterology      SUBJECTIVE:     Procedure: EUS    Chief Complaint/Indication for Procedure: pancreatic net    History of Present Illness:  Patient is a 78 y.o. female with pancreatic NET s/p distal pancreatectomy coming for EUS surveillance.     PTA Medications   Medication Sig    ACCU-CHEK SOFTCLIX LANCETS Misc     amLODIPine (NORVASC) 10 MG tablet Take 10 mg by mouth once daily.    co-enzyme Q-10 30 mg capsule Take 200 mg by mouth 3 (three) times daily.    CREON 24,000-76,000 -120,000 unit capsule Take 2 capsules by mouth 3 (three) times daily with meals.     cyanocobalamin (VITAMIN B-12) 1000 MCG tablet Take 100 mcg by mouth once daily.    metFORMIN (GLUCOPHAGE) 500 MG tablet Take 500 mg by mouth 2 (two) times daily with meals.    mirtazapine (REMERON) 15 MG tablet Take 15 mg by mouth every evening.    om-3-dha-epa-fish oil-vit D3 [phone] to-gt-bf-unit Cap Take 2,000 mg by mouth.    omeprazole (PRILOSEC) 20 MG capsule Take 20 mg by mouth once daily.    oxybutynin (DITROPAN-XL) 10 MG 24 hr tablet Take 10 mg by mouth once daily.    POLYETHYLENE GLYCOL 3350 ORAL     VIT E ACET-MIN OIL-DIMETHICONE TOP     vitamin E 400 UNIT capsule Take by mouth.    aspirin (ECOTRIN) 81 MG EC tablet Take 81 mg by mouth once daily.    ondansetron (ZOFRAN-ODT) 8 MG TbDL 8 mg every 12 (twelve) hours as needed.        Review of patient's allergies indicates:   Allergen Reactions    Epinephrine      Neuroendocrine Tumor patient          Past Medical History:   Diagnosis Date    Diabetes     HTN (hypertension)     Pancreatic cancer     Primary pancreatic neuroendocrine tumor     Ki 67- 3-20%     Past Surgical History:   Procedure Laterality Date    BLADDER SUSPENSION      DISTAL PANCREATECTOMY  04/06/2018    [facility] - Dr. Bolton    HYSTERECTOMY      PANCREATECTOMY-DISTAL N/A 4/6/2018    [facility]    SPLENECTOMY N/A 4/6/2018     [facility] 2ND FLR    ULTRASOUND-ENDOSCOPIC-UPPER N/A 1/8/2018    Performed by Sav[facility] at [facility] ENDO (2ND FLR)     History reviewed. No pertinent family history.  Social History     Tobacco Use    Smoking status: Never Smoker    Smokeless tobacco: Never Used   Substance Use Topics    Alcohol use: No    Drug use: No            OBJECTIVE:     Vital Signs (Most Recent)  Temp: 98 °F (36.7 °C) (06/19/19 0921)  Pulse: 81 (06/19/19 0921)  Resp: 20 (06/19/19 0921)  BP: (!) 184/79 (06/19/19 0922)  SpO2: 99 % (06/19/19 0921)       ASSESSMENT/PLAN:     Patient is a 78 y.o. female with pancreatic NET s/p distal pancreatectomy coming for EUS surveillance.     Plan: EUS    Anesthesia Plan: Moderate Sedation    ASA Grade: ASA 2 - Patient with mild systemic disease with no functional limitations

## 2019-06-19 NOTE — ANESTHESIA PREPROCEDURE EVALUATION
06/19/2019  Pre-operative evaluation for Procedure(s) (LRB):  PANCREATECTOMY-DISTAL (N/A)  SPLENECTOMY (N/A)    Rosamaria Aiken is a 78 y.o. female with PMH of HTN and DM    Prev airway: None on file    Patient Active Problem List   Diagnosis    Diabetes    HTN (hypertension)    Pancreatic mass    Anemia    Hyperglycemia    Vitamin D insufficiency    Weight loss, unintentional    Dehydration    Positive cardiac stress test    Neuroendocrine tumor of pancreas    Primary malignant neoplasm of tail of pancreas    Malignant neoplasm of islets of Langerhans    Secondary and unspecified malignant neoplasm of intra-abdominal lymph nodes       Review of patient's allergies indicates:  No Known Allergies     Current Outpatient Medications on File Prior to Visit   Medication Sig Dispense Refill    ACCU-CHEK SOFTCLIX LANCETS Misc       amLODIPine (NORVASC) 10 MG tablet Take 10 mg by mouth once daily.      aspirin (ECOTRIN) 81 MG EC tablet Take 81 mg by mouth once daily.      co-enzyme Q-10 30 mg capsule Take 200 mg by mouth 3 (three) times daily.      CREON 24,000-76,000 -120,000 unit capsule Take 2 capsules by mouth 3 (three) times daily with meals.   1    cyanocobalamin (VITAMIN B-12) 1000 MCG tablet Take 100 mcg by mouth once daily.      metFORMIN (GLUCOPHAGE) 500 MG tablet Take 500 mg by mouth 2 (two) times daily with meals.      mirtazapine (REMERON) 15 MG tablet Take 15 mg by mouth every evening.      om-3-dha-epa-fish oil-vit D3 332-302-251-300 to-rp-gz-unit Cap Take 2,000 mg by mouth.      omeprazole (PRILOSEC) 20 MG capsule Take 20 mg by mouth once daily.      ondansetron (ZOFRAN-ODT) 8 MG TbDL 8 mg every 12 (twelve) hours as needed.   0    oxybutynin (DITROPAN-XL) 10 MG 24 hr tablet Take 10 mg by mouth once daily.      POLYETHYLENE GLYCOL 3350 ORAL       VIT E ACET-MIN  OIL-DIMETHICONE TOP       vitamin E 400 UNIT capsule Take by mouth.       No current facility-administered medications on file prior to visit.        Past Surgical History:   Procedure Laterality Date    BLADDER SUSPENSION      DISTAL PANCREATECTOMY  04/06/2018    [facility] - Dr. Bolton    HYSTERECTOMY      PANCREATECTOMY-DISTAL N/A 4/6/2018    [facility] 2ND F[facility]    SPLENECTOMY N/A 4/6/2018    [facility] at [facility] OR 2ND FLR    ULTRASOUND-ENDOSCOPIC-UPPER N/A 1/8/2018    Performed by Savana Luna MD at [facility] ENDO (2ND FLR)       Social History     Socioeconomic History    Marital status:      Spouse name: Not on file    Number of children: Not on file    Years of education: Not on file    Highest education level: Not on file   Occupational History    Not on file   Social Needs    Financial resource strain: Not on file    Food insecurity:     Worry: Not on file     Inability: Not on file    Transportation needs:     Medical: Not on file     Non-medical: Not on file   Tobacco Use    Smoking status: Never Smoker    Smokeless tobacco: Never Used   Substance and Sexual Activity    Alcohol use: No    Drug use: No    Sexual activity: Not Currently   Lifestyle    Physical activity:     Days per week: Not on file     Minutes per session: Not on file    Stress: Not on file   Relationships    Social connections:     Talks on phone: Not on file     Gets together: Not on file     Attends [employer] service: Not on file     Active member of club or organization: Not on file     Attends meetings of clubs or organizations: Not on file     Relationship status: Not on file   Other Topics Concern    Not on file   Social History Narrative    Not on file         Vital Signs Range (Last 24H):  Temp:  [36.7 °C (98 °F)]   Pulse:  [81]   Resp:  [20]   BP: (184)/(79)   SpO2:  [99 %]       CBC: No results for input(s): WBC, RBC, HGB, HCT, PLT, MCV, MCH, MCHC  in the last 72 hours.    CMP: No results for input(s): NA, K, CL, CO2, BUN, CREATININE, GLU, MG, PHOS, CALCIUM, ALBUMIN, PROT, ALKPHOS, ALT, AST, BILITOT in the last 72 hours.    INR  No results for input(s): PT, INR, PROTIME, APTT in the last 72 hours.      EKG:  Normal sinus rhythm  Left bundle branch block  Abnormal ECG  When compared with ECG of 14-DEC-2017 19:51,  T wave inversion more evident in Inferior leads  Confirmed by Omaira YBARRA, [facility] [place] [facility] ([id number]) on 1/2/2018 5:38:31 PM    2D Echo:  CONCLUSIONS     1 - Mild left atrial enlargement.     2 - Eccentric hypertrophy.     3 - Mildly depressed left ventricular systolic function (EF 45-50%).     4 - Wall motion abnormalities.     5 - Impaired LV relaxation, elevated LAP (grade 2 diastolic dysfunction).     6 - Normal right ventricular systolic function .     7 - Mild mitral regurgitation.     8 - Recommend a myocardial perfusion stress test with a vasodilator.       This document has been electronically    SIGNED BY: Pk Wilson MD On: 03/07/2018 15:06    PET Stress 3/29/18:    EKG Conclusions:    1. The EKG portion of this study is uninterpretable for ischemia at a peak heart rate of 63 bpm (46% of predicted).   2. Blood pressure remained stable throughout the protocol  (Presenting BP: 158/74 Peak BP: 149/56).   3. No significant arrhythmias were present.   4. There were no symptoms of chest discomfort or significant dyspnea throughout the protocol.     CONCLUSIONS: NORMAL MYOCARDIAL PERFUSION PET STRESS TEST  1. The perfusion scan is free of evidence for myocardial ischemia or injury.   2. There is a mild fixed defect in the septal wall consistent with patient's underlying LBBB.  This is a common finding in patients with LBBB and not indicative of obstructive CAD.   2. Resting wall motion is physiologic. Stress wall motion is physiologic. There was abnormal septal motion possibly due to a conduction delay or post surgical changes.   3. LV function is normal at  rest and stress.  (normal is >= 51%)  4. The ventricular volumes are normal at rest and stress.   5. The extracardiac distribution of radioactivity is normal.   6. There was no previous study available to compare.      This document has been electronically    SIGNED BY: Jhony Cano MD On: 03/29/2018 17:23    Pre-op Assessment    I have reviewed the Patient Summary Reports.     I have reviewed the Nursing Notes.   I have reviewed the Medications.     Review of Systems  Anesthesia Hx:  No problems with previous Anesthesia  History of prior surgery of interest to airway management or planning: Previous anesthesia: General Denies Family Hx of Anesthesia complications.   Denies Personal Hx of Anesthesia complications.   Social:  Non-Smoker    Cardiovascular:   Hypertension Denies MI.  Denies CAD.           Pulmonary:  Pulmonary Normal    Renal/:  Renal/ Normal     Hepatic/GI:   GERD, well controlled Pancreatic mass   Neurological:  Neurology Normal    Endocrine:   Diabetes, type 2        Physical Exam  General:  Well nourished    Airway/Jaw/Neck:  Airway Findings: Mouth Opening: Normal Tongue: Normal  General Airway Assessment: Adult  Mallampati: II  TM Distance: Normal, at least 6 cm  Jaw/Neck Findings:  Neck ROM: Extension Decreased, Mod.      Dental:  Dental Findings: In tact        Mental Status:  Mental Status Findings:  Cooperative, Alert and Oriented         Anesthesia Plan  Type of Anesthesia, risks & benefits discussed:  Anesthesia Type:  general  Patient's Preference:   Intra-op Monitoring Plan: standard ASA monitors and arterial line  Intra-op Monitoring Plan Comments:   Post Op Pain Control Plan:   Post Op Pain Control Plan Comments:   Induction:   IV  Beta Blocker:  Patient is not currently on a Beta-Blocker (No further documentation required).       Informed Consent: Patient understands risks and agrees with Anesthesia plan.  Questions answered. Anesthesia consent signed with patient.  ASA Score: 3      Day of Surgery Review of History & Physical:    H&P update referred to the surgeon.         Ready For Surgery From Anesthesia Perspective.

## 2019-06-20 ENCOUNTER — TELEPHONE (OUTPATIENT)
Dept: NEUROLOGY | Facility: HOSPITAL | Age: 79
End: 2019-06-20

## 2019-06-27 DIAGNOSIS — E07.9 THYROID MASS: Primary | ICD-10-CM

## 2019-07-02 ENCOUNTER — HOSPITAL ENCOUNTER (OUTPATIENT)
Dept: CARDIOLOGY | Facility: HOSPITAL | Age: 79
Discharge: HOME | End: 2019-07-02
Attending: SURGERY
Payer: MEDICARE

## 2019-07-02 DIAGNOSIS — C25.2 MALIGNANT NEOPLASM OF TAIL OF PANCREAS: ICD-10-CM

## 2019-07-02 DIAGNOSIS — C77.2 SECONDARY AND UNSPECIFIED MALIGNANT NEOPLASM OF INTRA-ABDOMINAL LYMPH NODES: ICD-10-CM

## 2019-07-02 LAB
AORTIC ROOT ANNULUS: 3.17 CM
AORTIC VALVE CUSP SEPERATION: 1.77 CM
AV INDEX (PROSTH): 0.5
AV MEAN GRADIENT: 7 MMHG
AV PEAK GRADIENT: 12 MMHG
AV VALVE AREA: 1.58 CM2
AV VELOCITY RATIO: 0.5
CV ECHO LV RWT: 0.42 CM
DOP CALC AO PEAK VEL: 1.73 M/S
DOP CALC AO VTI: 40.37 CM
DOP CALC LVOT AREA: 3.1 CM2
DOP CALC LVOT DIAMETER: 2 CM
DOP CALC LVOT PEAK VEL: 0.87 M/S
DOP CALC LVOT STROKE VOLUME: 63.96 CM3
DOP CALCLVOT PEAK VEL VTI: 20.37 CM
E WAVE DECELERATION TIME: 189.97 MSEC
E/A RATIO: 0.6
ECHO LV POSTERIOR WALL: 1.11 CM (ref 0.6–1.1)
FRACTIONAL SHORTENING: 12 % (ref 28–44)
INTERVENTRICULAR SEPTUM: 0.94 CM (ref 0.6–1.1)
IVRT: 0.08 MSEC
LA MAJOR: 5.21 CM
LA WIDTH: 3.34 CM
LEFT ATRIUM SIZE: 3.76 CM
LEFT INTERNAL DIMENSION IN SYSTOLE: 4.71 CM (ref 2.1–4)
LEFT VENTRICLE DIASTOLIC VOLUME: 137.94 ML
LEFT VENTRICLE SYSTOLIC VOLUME: 102.8 ML
LEFT VENTRICULAR INTERNAL DIMENSION IN DIASTOLE: 5.34 CM (ref 3.5–6)
LEFT VENTRICULAR MASS: 209.69 G
LV LATERAL E/E' RATIO: 26.67 M/S
MV PEAK A VEL: 1.33 M/S
MV PEAK E VEL: 0.8 M/S
PISA TR MAX VEL: 2.87 M/S
PULM VEIN S/D RATIO: 1.86
PV PEAK D VEL: 0.21 M/S
PV PEAK S VEL: 0.39 M/S
PV PEAK VELOCITY: 0.88 CM/S
RA MAJOR: 3.98 CM
RA PRESSURE: 3 MMHG
TDI LATERAL: 0.03 M/S
TR MAX PG: 33 MMHG
TV REST PULMONARY ARTERY PRESSURE: 36 MMHG

## 2019-07-02 PROCEDURE — 93306 TTE W/DOPPLER COMPLETE: CPT | Mod: 26,,, | Performed by: INTERNAL MEDICINE

## 2019-07-02 PROCEDURE — 93306 TTE W/DOPPLER COMPLETE: CPT

## 2019-07-02 PROCEDURE — 93306 TRANSTHORACIC ECHO (TTE) COMPLETE (CUPID ONLY): ICD-10-PCS | Mod: 26,,, | Performed by: INTERNAL MEDICINE

## 2019-07-09 ENCOUNTER — TELEPHONE (OUTPATIENT)
Dept: NEUROLOGY | Facility: HOSPITAL | Age: 79
End: 2019-07-09

## 2019-07-09 DIAGNOSIS — E07.9 THYROID MASS: Primary | ICD-10-CM

## 2019-07-09 NOTE — TELEPHONE ENCOUNTER
----- Message from Cecily Victor sent at 7/8/2019  9:34 AM CDT -----  Contact: self, [phone]  Patient requests to speak with Jessica regarding her thyroid test results.

## 2019-07-09 NOTE — TELEPHONE ENCOUNTER
Sent a message to IR scheduling and spoke to Jazmin. Patient is being scheduled for both the ultrasound and biopsy. Patient has no further questions at this time.

## 2019-07-17 ENCOUNTER — HOSPITAL ENCOUNTER (OUTPATIENT)
Dept: RADIOLOGY | Facility: HOSPITAL | Age: 79
Discharge: HOME | End: 2019-07-17
Attending: SURGERY
Payer: MEDICARE

## 2019-07-17 DIAGNOSIS — E07.9 THYROID MASS: ICD-10-CM

## 2019-07-17 PROCEDURE — 76536 US EXAM OF HEAD AND NECK: CPT | Mod: 26,,, | Performed by: RADIOLOGY

## 2019-07-17 PROCEDURE — 76536 US THYROID: ICD-10-PCS | Mod: 26,,, | Performed by: RADIOLOGY

## 2019-07-17 PROCEDURE — 76536 US EXAM OF HEAD AND NECK: CPT | Mod: TC

## 2019-07-24 ENCOUNTER — TELEPHONE (OUTPATIENT)
Dept: NEUROLOGY | Facility: HOSPITAL | Age: 79
End: 2019-07-24

## 2019-07-24 NOTE — TELEPHONE ENCOUNTER
----- Message from Le Sharma sent at 7/19/2019  1:21 PM CDT -----  Contact: [phone]/self  EAW  Type:  Test Results    Who Called: pt  Name of Test (Lab/Mammo/Etc): ultrasound  Date of Test: 7/17  Ordering Provider: Dr. Orona  Where the test was performed: [facility]  Would the patient rather a call back or a response via [facility]? Call back  Best Call Back Number: [phone]  Additional Information:

## 2019-07-24 NOTE — TELEPHONE ENCOUNTER
Returned patients call, advised her that no thyroid biopsy is needed at this time. Patient verbalized her understanding and has no further questions at this time.

## 2019-09-01 LAB
ALBUMIN SERPL-MCNC: 4.2 G/DL (ref 3.6–5.1)
ALBUMIN/GLOB SERPL: 1.8 (CALC) (ref 1–2.5)
ALP SERPL-CCNC: 67 U/L (ref 33–130)
ALT SERPL-CCNC: 8 U/L (ref 6–29)
AST SERPL-CCNC: 11 U/L (ref 10–35)
BASOPHILS # BLD AUTO: 98 CELLS/UL (ref 0–200)
BASOPHILS NFR BLD AUTO: 1.2 %
BILIRUB SERPL-MCNC: 0.6 MG/DL (ref 0.2–1.2)
BUN SERPL-MCNC: 26 MG/DL (ref 7–25)
BUN/CREAT SERPL: 27 (CALC) (ref 6–22)
C PEPTIDE SERPL-MCNC: 1.32 NG/ML (ref 0.8–3.85)
CALCIUM SERPL-MCNC: 9.8 MG/DL (ref 8.6–10.4)
CHLORIDE SERPL-SCNC: 104 MMOL/L (ref 98–110)
CO2 SERPL-SCNC: 28 MMOL/L (ref 20–32)
CREAT SERPL-MCNC: 0.97 MG/DL (ref 0.6–0.93)
EOSINOPHIL # BLD AUTO: 492 CELLS/UL (ref 15–500)
EOSINOPHIL NFR BLD AUTO: 6 %
ERYTHROCYTE [DISTWIDTH] IN BLOOD BY AUTOMATED COUNT: 13.2 % (ref 11–15)
GASTRIN SERPL-MCNC: 144 PG/ML
GFRSERPLBLD MDRD-ARVRAT: 56 ML/MIN/1.73M2
GHRELIN, TOTAL (PLASMA): 583 PG/ML
GLOBULIN SER CALC-MCNC: 2.4 G/DL (CALC) (ref 1.9–3.7)
GLUCAGON SERPL-MCNC: 37 PG/ML (ref 8–57)
GLUCOSE SERPL-MCNC: 106 MG/DL (ref 65–99)
HCT VFR BLD AUTO: 41 % (ref 35–45)
HGB BLD-MCNC: 13.5 G/DL (ref 11.7–15.5)
INSULIN SERPL-ACNC: 3.7 UIU/ML (ref 2–19.6)
LYMPHOCYTES # BLD AUTO: 2288 CELLS/UL (ref 850–3900)
LYMPHOCYTES NFR BLD AUTO: 27.9 %
MCH RBC QN AUTO: 32.6 PG (ref 27–33)
MCHC RBC AUTO-ENTMCNC: 32.9 G/DL (ref 32–36)
MCV RBC AUTO: 99 FL (ref 80–100)
MONOCYTES # BLD AUTO: 779 CELLS/UL (ref 200–950)
MONOCYTES NFR BLD AUTO: 9.5 %
NEUTROPHILS # BLD AUTO: 4543 CELLS/UL (ref 1500–7800)
NEUTROPHILS NFR BLD AUTO: 55.4 %
PANC POLYPEPT SERPL-MCNC: 261 PG/ML
PANCREASTATIN: 96 PG/ML (ref 10–135)
PLATELET # BLD AUTO: 365 THOUSAND/UL (ref 140–400)
PMV BLD REES-ECKER: 11.6 FL (ref 7.5–12.5)
POTASSIUM SERPL-SCNC: 4.4 MMOL/L (ref 3.5–5.3)
PROINSULIN SERPL-SCNC: 10.5 PMOL/L
PROT SERPL-MCNC: 6.6 G/DL (ref 6.1–8.1)
RBC # BLD AUTO: 4.14 MILLION/UL (ref 3.8–5.1)
SEROTONIN SER-MCNC: 84 NG/ML (ref 56–244)
SODIUM SERPL-SCNC: 142 MMOL/L (ref 135–146)
SOMATOSTAT PLAS-MCNC: 35 PG/ML
VIP SERPL-MCNC: <50 PG/ML
WBC # BLD AUTO: 8.2 THOUSAND/UL (ref 3.8–10.8)

## 2019-11-06 LAB
EXT 24 HR UR METANEPHRINE: NORMAL
EXT 24 HR UR NORMETANEPHRINE: NORMAL
EXT 24 HR UR NORMETANEPHRINE: NORMAL
EXT 25 HYDROXY VIT D2: NORMAL
EXT 25 HYDROXY VIT D3: NORMAL
EXT 5 HIAA 24 HR URINE: NORMAL
EXT 5 HIAA BLOOD: NORMAL
EXT ACTH: NORMAL
EXT AFP: NORMAL
EXT ALBUMIN: NORMAL
EXT ALKALINE PHOSPHATASE: NORMAL
EXT ALT: NORMAL
EXT AMYLASE: NORMAL
EXT ANTI ISLET CELL AB: NORMAL
EXT ANTI PARIETAL CELL AB: NORMAL
EXT ANTI THYROID AB: NORMAL
EXT AST: NORMAL
EXT BILIRUBIN DIRECT: NORMAL
EXT BILIRUBIN TOTAL: NORMAL
EXT BK VIRUS DNA QN PCR: NORMAL
EXT BUN: NORMAL
EXT C PEPTIDE: 1.49 NG/ML (ref 0.8–3.85)
EXT CA 125: NORMAL
EXT CA 19-9: NORMAL
EXT CA 27-29: NORMAL
EXT CALCITONIN: NORMAL
EXT CALCIUM: NORMAL
EXT CEA: NORMAL
EXT CHLORIDE: NORMAL
EXT CHOLESTEROL: NORMAL
EXT CHROMOGRANIN A: NORMAL
EXT CO2: NORMAL
EXT CREATININE UA: NORMAL
EXT CREATININE: NORMAL
EXT CYCLOSPORONE LEVEL: NORMAL
EXT DOPAMINE: NORMAL
EXT EBV DNA BY PCR: NORMAL
EXT EPINEPHRINE: NORMAL
EXT FOLATE: NORMAL
EXT FREE T3: NORMAL
EXT FREE T4: NORMAL
EXT FSH: NORMAL
EXT GASTRIN RELEASING PEPTIDE: NORMAL
EXT GASTRIN RELEASING PEPTIDE: NORMAL
EXT GASTRIN: 269 PG/ML
EXT GGT: NORMAL
EXT GHRELIN: 663 PG/ML
EXT GLUCAGON: 33 PG/ML (ref 8–57)
EXT GLUCOSE: NORMAL
EXT GROWTH HORMONE: NORMAL
EXT HCV RNA QUANT PCR: NORMAL
EXT HDL: NORMAL
EXT HEMATOCRIT: NORMAL
EXT HEMOGLOBIN A1C: NORMAL
EXT HEMOGLOBIN: NORMAL
EXT HISTAMINE 24 HR URINE: NORMAL
EXT HISTAMINE: NORMAL
EXT IGF-1: NORMAL
EXT IMMUNKNOW (NON-STIMULATED): NORMAL
EXT IMMUNKNOW (STIMULATED): NORMAL
EXT INR: NORMAL
EXT INSULIN: 4 UIU/ML (ref 2–19.6)
EXT LANREOTIDE LEVEL: NORMAL
EXT LDH, TOTAL: NORMAL
EXT LDL CHOLESTEROL: NORMAL
EXT LIPASE: NORMAL
EXT MAGNESIUM: NORMAL
EXT METANEPHRINE FREE PLASMA: NORMAL
EXT MOTILIN: NORMAL
EXT NEUROKININ A CAMB: NORMAL
EXT NEUROKININ A ISI: NORMAL
EXT NEUROTENSIN: NORMAL
EXT NOREPINEPHRINE: NORMAL
EXT NORMETANEPHRINE: NORMAL
EXT NSE: NORMAL
EXT OCTREOTIDE LEVEL: NORMAL
EXT PANCREASTATIN CAMB: NORMAL
EXT PANCREASTATIN ISI: 107 PG/ML (ref 10–135)
EXT PANCREATIC POLYPEPTIDE: NORMAL
EXT PHOSPHORUS: NORMAL
EXT PLATELETS: NORMAL
EXT POTASSIUM: NORMAL
EXT PROGRAF LEVEL: NORMAL
EXT PROLACTIN: NORMAL
EXT PROTEIN TOTAL: NORMAL
EXT PROTEIN UA: NORMAL
EXT PT: NORMAL
EXT PTH, INTACT: NORMAL
EXT PTT: NORMAL
EXT RAPAMUNE LEVEL: NORMAL
EXT SEROTONIN: 116 NG/ML (ref 56–244)
EXT SODIUM: NORMAL
EXT SOMATOSTATIN: <21 PG/ML
EXT SUBSTANCE P: NORMAL
EXT TRIGLYCERIDES: NORMAL
EXT TRYPTASE: NORMAL
EXT TSH: NORMAL
EXT URIC ACID: NORMAL
EXT URINE AMYLASE U/HR: NORMAL
EXT URINE AMYLASE U/L: NORMAL
EXT VASOACTIVE INTESTINAL POLYPEPTIDE: NORMAL
EXT VITAMIN B12: NORMAL
EXT VMA 24 HR URINE: NORMAL
EXT WBC: NORMAL
NEURON SPECIFIC ENOLASE: NORMAL

## 2019-11-09 LAB
ALBUMIN SERPL-MCNC: 4 G/DL (ref 3.6–5.1)
ALBUMIN/GLOB SERPL: 1.7 (CALC) (ref 1–2.5)
ALP SERPL-CCNC: 73 U/L (ref 33–130)
ALT SERPL-CCNC: 9 U/L (ref 6–29)
AST SERPL-CCNC: 14 U/L (ref 10–35)
BASOPHILS # BLD AUTO: 84 CELLS/UL (ref 0–200)
BASOPHILS NFR BLD AUTO: 1.1 %
BILIRUB SERPL-MCNC: 0.6 MG/DL (ref 0.2–1.2)
BUN SERPL-MCNC: 24 MG/DL (ref 7–25)
BUN/CREAT SERPL: 24 (CALC) (ref 6–22)
C PEPTIDE SERPL-MCNC: 1.49 NG/ML (ref 0.8–3.85)
CALCIUM SERPL-MCNC: 9.9 MG/DL (ref 8.6–10.4)
CHLORIDE SERPL-SCNC: 107 MMOL/L (ref 98–110)
CO2 SERPL-SCNC: 26 MMOL/L (ref 20–32)
CREAT SERPL-MCNC: 1.02 MG/DL (ref 0.6–0.93)
EOSINOPHIL # BLD AUTO: 448 CELLS/UL (ref 15–500)
EOSINOPHIL NFR BLD AUTO: 5.9 %
ERYTHROCYTE [DISTWIDTH] IN BLOOD BY AUTOMATED COUNT: 13.2 % (ref 11–15)
GASTRIN SERPL-MCNC: 269 PG/ML
GFRSERPLBLD MDRD-ARVRAT: 53 ML/MIN/1.73M2
GHRELIN, TOTAL (PLASMA): NORMAL
GLOBULIN SER CALC-MCNC: 2.4 G/DL (CALC) (ref 1.9–3.7)
GLUCAGON SERPL-MCNC: NORMAL PG/ML
GLUCOSE SERPL-MCNC: 113 MG/DL (ref 65–99)
HCT VFR BLD AUTO: 42.8 % (ref 35–45)
HGB BLD-MCNC: 13.6 G/DL (ref 11.7–15.5)
INSULIN SERPL-ACNC: 4 UIU/ML (ref 2–19.6)
LYMPHOCYTES # BLD AUTO: 2364 CELLS/UL (ref 850–3900)
LYMPHOCYTES NFR BLD AUTO: 31.1 %
MCH RBC QN AUTO: 31.3 PG (ref 27–33)
MCHC RBC AUTO-ENTMCNC: 31.8 G/DL (ref 32–36)
MCV RBC AUTO: 98.4 FL (ref 80–100)
MONOCYTES # BLD AUTO: 821 CELLS/UL (ref 200–950)
MONOCYTES NFR BLD AUTO: 10.8 %
NEUTROPHILS # BLD AUTO: 3884 CELLS/UL (ref 1500–7800)
NEUTROPHILS NFR BLD AUTO: 51.1 %
PANC POLYPEPT SERPL-MCNC: NORMAL NG/L
PANCREASTATIN: NORMAL
PLATELET # BLD AUTO: 220 THOUSAND/UL (ref 140–400)
PMV BLD REES-ECKER: 12.2 FL (ref 7.5–12.5)
POTASSIUM SERPL-SCNC: 4.7 MMOL/L (ref 3.5–5.3)
PROINSULIN SERPL-SCNC: NORMAL PMOL/L
PROT SERPL-MCNC: 6.4 G/DL (ref 6.1–8.1)
RBC # BLD AUTO: 4.35 MILLION/UL (ref 3.8–5.1)
SEROTONIN SER-MCNC: 116 NG/ML (ref 56–244)
SODIUM SERPL-SCNC: 145 MMOL/L (ref 135–146)
SOMATOSTAT PLAS-MCNC: NORMAL PG/ML
VIP SERPL-MCNC: NORMAL NG/L
WBC # BLD AUTO: 7.6 THOUSAND/UL (ref 3.8–10.8)

## 2019-12-03 ENCOUNTER — TELEPHONE (OUTPATIENT)
Dept: NEUROLOGY | Facility: HOSPITAL | Age: 79
End: 2019-12-03

## 2019-12-03 ENCOUNTER — PATIENT MESSAGE (OUTPATIENT)
Dept: NEUROLOGY | Facility: HOSPITAL | Age: 79
End: 2019-12-03

## 2019-12-03 NOTE — TELEPHONE ENCOUNTER
LM for patient to return a call to the office in regards to rescheduling a follow up appointment that was canceled due to Dr. Orona retiring.

## 2019-12-03 NOTE — TELEPHONE ENCOUNTER
----- Message from Oliva Mckenna sent at 12/3/2019  2:54 PM CST -----  Contact: Pt/ [phone]  MM----Pt is returning your call in regards to rescheduling an appt with Dr. Doherty.    Please call and advise.

## 2019-12-03 NOTE — TELEPHONE ENCOUNTER
----- Message from Mary Jo Stewart sent at 12/3/2019 10:32 AM CST -----  Contact: patient  Type:  Patient Returning Call    Who Called: Rosamaria  Who Left Message for Patient: Jessica  Does the patient know what this is regarding?: no  Would the patient rather a call back or a response via [facility]?  Call back  Best Call Back Number: [phone]  Additional Information:  no

## 2019-12-04 ENCOUNTER — TELEPHONE (OUTPATIENT)
Dept: NEUROLOGY | Facility: HOSPITAL | Age: 79
End: 2019-12-04

## 2019-12-04 NOTE — TELEPHONE ENCOUNTER
Returned patients call. Scheduled patient for scans and appointment to follow up with Dr. Doherty. Patient has no further questions at this time.

## 2019-12-04 NOTE — TELEPHONE ENCOUNTER
----- Message from Oliva Mckenna sent at 12/4/2019 11:31 AM CST -----  Contact: Pt/ [phone]  EAW----Rosamaria Aiken calling regarding Appointment for a yearly appt.  Pt is needing to schedule the appt that was cancelled with Dr. Orona.    Please call and advise.

## 2019-12-17 ENCOUNTER — HOSPITAL ENCOUNTER (OUTPATIENT)
Dept: RADIOLOGY | Facility: HOSPITAL | Age: 79
Discharge: HOME | End: 2019-12-17
Attending: SURGERY
Payer: MEDICARE

## 2019-12-17 DIAGNOSIS — D49.89 NEOPLASM OF ABDOMEN: ICD-10-CM

## 2019-12-17 PROCEDURE — 74183 MRI ABD W/O CNTR FLWD CNTR: CPT | Mod: 26,,, | Performed by: RADIOLOGY

## 2019-12-17 PROCEDURE — 25500020 PHARM REV CODE 255: Performed by: SURGERY

## 2019-12-17 PROCEDURE — 74177 CT ABD & PELVIS W/CONTRAST: CPT | Mod: TC

## 2019-12-17 PROCEDURE — 74177 CT ABD & PELVIS W/CONTRAST: CPT | Mod: 26,,, | Performed by: RADIOLOGY

## 2019-12-17 PROCEDURE — A9585 GADOBUTROL INJECTION: HCPCS | Performed by: SURGERY

## 2019-12-17 PROCEDURE — 74183 MRI ABDOMEN W WO CONTRAST: ICD-10-PCS | Mod: 26,,, | Performed by: RADIOLOGY

## 2019-12-17 PROCEDURE — 74177 CT ABDOMEN PELVIS WITH CONTRAST: ICD-10-PCS | Mod: 26,,, | Performed by: RADIOLOGY

## 2019-12-17 PROCEDURE — 74183 MRI ABD W/O CNTR FLWD CNTR: CPT | Mod: TC

## 2019-12-17 RX ORDER — GADOBUTROL 604.72 MG/ML
10 INJECTION INTRAVENOUS
Status: COMPLETED | OUTPATIENT
Start: 2019-12-17 | End: 2019-12-17

## 2019-12-17 RX ADMIN — IOHEXOL 75 ML: 350 INJECTION, SOLUTION INTRAVENOUS at 12:12

## 2019-12-17 RX ADMIN — IOHEXOL 30 ML: 350 INJECTION, SOLUTION INTRAVENOUS at 11:12

## 2019-12-17 RX ADMIN — GADOBUTROL 10 ML: 604.72 INJECTION INTRAVENOUS at 10:12

## 2020-01-07 ENCOUNTER — TELEPHONE (OUTPATIENT)
Dept: NEUROLOGY | Facility: HOSPITAL | Age: 80
End: 2020-01-07

## 2020-01-07 NOTE — TELEPHONE ENCOUNTER
Returned patients call. Patient going out of town to her Grand-daughters house. Rescheduled her appointment. Patient has no further questions at this time.

## 2020-01-07 NOTE — TELEPHONE ENCOUNTER
----- Message from Estefani Esparza sent at 1/7/2020  9:58 AM CST -----  Contact: Self / [phone]  MM: Needs to speak with you on her appointment. Please advise

## 2020-01-29 NOTE — PROGRESS NOTES
NOLANETS:  [facility]  A collaboration between [facility] and [facility]      PATIENT: Rosamaria Aiken  MRN: [id number]  DATE: 1/29/2020    Subjective:      Chief Complaint: 6 month follow up for pancreatic neuroendocrine tumor.  Review most recent imaging and blood work results. Establish surveillance and treatment plan.     Overview / HPI: This nice lady has a pancreatic neuroendocrine tumor diagnosed 1/2018.    Interval History:   She returns to clinic for scheduled follow-up to review the findings on the most recent imaging and blood work and to establish a surveillance program at this juncture.     Recent testing includes tumor markers (11/2019), Ct abd/pelvis (12/2019), MRI abd (12/2019), Ga 68 PET/CT scan (5/2019).    Vitals: There were no vitals taken for this visit. --stable    ECOG Score: 0 - Asymptomatic    Oncologic History:   Oncologic History Pancreatic net- dx 1/2018   Oncologic Treatment 4/2018- distal panc & spleen (Bart)   Pathology 1/2018- panc FNA- differential includes a neuroendocrine tumor  4/2018- panc- well diff net, G2, unifocal, Ki 67 3-20%, pT2 N1 (6/28 nodes)     Past Medical History:  Past Medical History:   Diagnosis Date    Diabetes     HTN (hypertension)     Pancreatic cancer     Primary pancreatic neuroendocrine tumor     Ki 67- 3-20%       Past Surgical History:  Past Surgical History:   Procedure Laterality Date    BLADDER SUSPENSION      DISTAL PANCREATECTOMY  04/06/2018    [facility] - Dr. Bolton    ENDOSCOPIC ULTRASOUND OF UPPER GASTROINTESTINAL TRACT N/A 6/19/2019    Procedure: ULTRASOUND-ENDOSCOPIC-UPPER;  Surgeon: Savana Luna MD;  Location: [facility];  Service: Endoscopy;  Laterality: N/A;  Carcinoid Diagnosis    HYSTERECTOMY         Family History:  No family history on file.    Allergies:  Epinephrine    Medications:  Current Outpatient Medications   Medication Sig     ACCU-CHEK SOFTCLIX LANCETS Misc     amLODIPine (NORVASC) 10 MG tablet Take 10 mg by mouth once daily.    aspirin (ECOTRIN) 81 MG EC tablet Take 81 mg by mouth once daily.    co-enzyme Q-10 30 mg capsule Take 200 mg by mouth 3 (three) times daily.    CREON 24,000-76,000 -120,000 unit capsule Take 2 capsules by mouth 3 (three) times daily with meals.     cyanocobalamin (VITAMIN B-12) 1000 MCG tablet Take 100 mcg by mouth once daily.    metFORMIN (GLUCOPHAGE) 500 MG tablet Take 500 mg by mouth 2 (two) times daily with meals.    mirtazapine (REMERON) 15 MG tablet Take 15 mg by mouth every evening.    om-3-dha-epa-fish oil-vit D3 [id number] eh-ux-sl-unit Cap Take 2,000 mg by mouth.    omeprazole (PRILOSEC) 20 MG capsule Take 20 mg by mouth once daily.    ondansetron (ZOFRAN-ODT) 8 MG TbDL 8 mg every 12 (twelve) hours as needed.     oxybutynin (DITROPAN-XL) 10 MG 24 hr tablet Take 10 mg by mouth once daily.    POLYETHYLENE GLYCOL 3350 ORAL     VIT E ACET-MIN OIL-DIMETHICONE TOP     vitamin E 400 UNIT capsule Take by mouth.     No current facility-administered medications for this visit.         Review of Systems   Constitutional: Negative.    HENT: Negative.    Eyes: Negative.    Respiratory:        History of pneumonia   Cardiovascular: Negative.         High blood pressure   Gastrointestinal: Positive for abdominal pain.   Endocrine: Negative.    Genitourinary:        History of urinary tract infections   Musculoskeletal: Negative.    Skin: Negative.    Allergic/Immunologic: Negative.    Neurological: Negative.    Psychiatric/Behavioral: Negative.           Objective:      Physical Exam   Constitutional: She is oriented to person, place, and time. She appears well-developed and well-nourished. No distress.   petite stature   HENT:   Head: Normocephalic.   Eyes: Pupils are equal, round, and reactive to light.   Neck: Normal range of motion.   Cardiovascular: Normal rate.   Pulmonary/Chest: No  stridor. No respiratory distress.   Musculoskeletal: She exhibits no edema.   Neurological: She is alert and oriented to person, place, and time.   Skin: Skin is warm. She is not diaphoretic.   Psychiatric: She has a normal mood and affect. Her behavior is normal. Judgment and thought content normal.        Laboratory Data:    Neuroendocrine Labs Latest Ref Rng & Units 11/6/2019   GASTRIN < OR = 100 pg/mL 269 (H)   EXT GASTRIN <uz=170 pg/ml 269   EXT SEROTONIN 56 - 244 ng/ml 116   PANCREASTATIN 10 - 135 pg/mL    EXT PANCREASTATIN KEN 10 - 135 pg/ml 107   EXT GLUCAGON 8 - 57 pg/ml 33   EXT PANCREATIC POLYPEPTIDE pg/ml    EXT VIP 0 - 75 pg/ml    C PEPTIDE 0.80 - 3.85 ng/mL 1.49   EXT C PEPTIDE 0.80 - 3.85 ng/ml 1.49   EXT INSULIN 2.0 - 19.6 uiu/ml 4.0   SOMATOSTATIN pg/mL    EXT SOMATOSTATIN <or=30 pg/ml <21   GHRELIN pg/mL    EXT GHRELIN pg/ml 663   WBC 3.8 - 10.8 Thousand/uL 7.6   EXT WBC 3.8 - 10.8 1000/ul    HGB 11.7 - 15.5 g/dL 13.6   EXT HGB 11.7 - 15.5 g/dl    HCT 35.0 - 45.0 % 42.8   EXT HCT 35.0 - 45.0 %    PLATLETS 140 - 400 Thousand/uL 220   EXT PLATLETS 140 - 400 1000/ul    PTT 24.9 - 35.2 sec    GLUCOSE 65 - 99 mg/dL 113 (H)   EXT GLUCOSE 65 - 99 mg/dl    BUN 7 - 25 mg/dL 24   EXT BUN 7 - 25 mg/dl    CREATININE 0.60 - 0.93 mg/dL 1.02 (H)   EXT CREATININE 0.60 - 0.93 mg/dl     - 146 mmol/L 145   EXT  - 145 mmol/l    K 3.5 - 5.3 mmol/L 4.7   EXT K 3.5 - 5.3 mmol/l    CHLORIDE 98 - 110 mmol/L 107   EXT CHLORIDE 98 - 110 mmol/l    CO2 20 - 32 mmol/L 26   EXT CO2 20 - 32 mmol/l    CALCIUM 8.6 - 10.4 mg/dL 9.9   EXT CALCIUM 8.6 - 10.4 mg/dl    PROTEIN, TOTAL 6.1 - 8.1 g/dL 6.4   EXT PROTEIN, TOTAL 6.1 - 8.1 g/dl    PHOSPHORUS 2.7 - 4.5 mg/dL    ALBUMIN 3.6 - 5.1 g/dL 4.0   EXT ALBUMIN 3.6 - 5.1 g/dl    URIC ACID 2.50 - 6.20 mg/dL    TOTAL BILIRUBIN 0.2 - 1.2 mg/dL 0.6   EXT TOTAL BILIRUBIN 0.2 - 1.2 mg/dl    ALK PHOSPHATASE 33 - 130 U/L 73   EXT ALK PHOSPHATASE 33 - 130 u/l    SGOT (AST) 10 - 35 U/L  14   EXT SGOT (AST) 10 - 35 u/l    SGPT (ALT) 6 - 29 U/L 9     Scans:   CT Abdomen Pelvis With Contrast-12/17/19  Narrative: EXAMINATION:  CT ABDOMEN PELVIS WITH CONTRAST    CLINICAL HISTORY:  Neoplasm: abdomen, metastatic, recurrence, suspected/known; Neoplasm of unspecified behavior of other specified sites    TECHNIQUE:  Low dose axial images, sagittal and coronal reformations were obtained from the lung bases to the pubic symphysis following the IV administration of 75 mL of Omnipaque 350 and the oral administration of 30 mL of Omnipaque 350.    COMPARISON:  05/17/2019    FINDINGS:  The lung bases are clear.  No liver lesions.  The gallbladder is present.  The stomach, adrenal glands and bilateral kidneys appear normal.  Bilateral kidney cysts.  Prior partial pancreatectomy.  Splenectomy.  There is atherosclerotic plaque of the aorta, it tapers normally, no para-aortic lymphadenopathy.  The mesentery appears normal.  Moderate stool retention, no inflammatory changes of the bowel seen, no bowel dilation.  The bladder appears normal.  The uterus has been removed.  The ovaries are not seen.  No ascites.  The inguinal regions appear normal.  The osseous structures demonstrate no destructive osseous lesions.  Impression: No interval detrimental change from the prior study.    No evidence of metastatic disease.    Bilateral kidney cysts.    Prior partial pancreatectomy.    Splenectomy.    There are no measurable lesions per RECIST criteria.      MRI Abdomen W WO Contrast-12/17/19  Narrative: EXAMINATION:  MRI ABDOMEN W WO CONTRAST    CLINICAL HISTORY:  Neoplasm: abdomen, metastatic, recurrence, suspected/known;  Neoplasm of unspecified behavior of other specified sites    TECHNIQUE:  Multiplanar multi sequences images of the abdomen obtained, the patient received 10 cc of Gadavist IV contrast.    COMPARISON:  05/17/2019    FINDINGS:  The lung bases appear normal.  No liver lesion seen.  The gallbladder is present.   The stomach appears within normal limits.  Partial pancreatectomy.  Splenectomy.  The aorta tapers normally, no para-aortic lymphadenopathy.  Bilateral kidney simple cysts larger on the right.  The adrenal glands appear normal.  No ascites.  Benign-appearing bone lesion likely hemangioma, S1 and L4 vertebrae.  Impression: No evidence of metastatic disease.    Bilateral kidney cysts.    Prior partial pancreatectomy.  Splenectomy.    There are no measurable lesions per RECIST criteria.      NM PET 68GA DOTATATE WHOLE BODY-5/17/19    CLINICAL HISTORY:  Malignant neoplasm of tail of pancreas    TECHNIQUE:  4.63 mCi of GA68 Dotatate was administered intravenously in the left antecubital fossa.  After an approximately 60 min distribution time, PET/CT images were acquired from the skull vertex to the mid thigh. Transmission images were acquired to correct for attenuation using a whole body low-dose CT scan without contrast with the arms positioned above the head.    COMPARISON:  CT abdomen/pelvis from 11/19/2018.  MRI abdomen from 11/19/2018.  Gallium PET-CT from 06/04/2018.    FINDINGS:  Quality of the study: Adequate.    Focal radiotracer uptake in a 0.8 cm soft tissue nodule inferior to the left lobe of the thyroid with SUV max of 3.36.  No additional abnormal radiotracer uptake.    Physiologic uptake of the tracer is seen within the pituitary, liver, spleen, kidneys, adrenal glands and bowel.    Incidental CT findings: Coronary atherosclerosis. Postoperative changes noted status post distal pancreatectomy and splenectomy. Stable bilateral renal cysts.      Impression       No abnormal tracer uptake to suggest local recurrence or metastatic disease.    Focal radiotracer uptake in a subcentimeter soft tissue nodule inferior to the left lobe of thyroid.  Findings are nonspecific, could possibly represent parathyroid hyperplasia or adenoma.  Recommend further evaluation with dedicated ultrasound.           Assessment/Impression:         Problem List Items Addressed This Visit     None           Plan:         I had a long conversation with the patient about the features of her case that support the treatment and surveillance recommendations outlined below:  1.  She had a grade 2, well-differentiated, pancreatic neuroendocrine tumor resected with the distal pancreatectomy and splenectomy in 2018.  The final pathology showed 6 of 28 nodes involved.  The margins were negative the features of her case that contribute to a cancer specific event include the grade 2 histopathology as well as the node positivity.  She had a CT scan of the abdomen pelvis as well as the MRI of the abdomen that showed no evidence of recurrence disease in the pancreatic bed and no new findings to suggest the development of distant metastatic disease.    2.  Pancreatic neuroendocrine tumors have an ongoing risk of the development of distant metastatic disease even years after resection of the primary.  Cross-sectional imaging is the standard surveillance method for abdominal neuroendocrine tumors.  CT scans are affective in patients with intestinal primaries to evaluate the remnant small bowel, small bowel mesentery, and retroperitoneal lymph node basins.  It is commonly used to survey patients with pancreatic primaries in the non metastatic setting.  An MRI is most effective in patients with hepatic biliary primaries that include the pancreas and for evaluating subtle changes within the liver that would suggest recurrence or progression of disease in the site. In patients in her age group we need to consider the risks of exposure to iodinated contrast going through kidneys that have decreased function.  I believe is far safer to use an MRI in surveillance and hold on CT cross-sectional imaging.  An MRI is superior to the CT for evaluating the liver and pancreas.  I explained that in patients with chronic kidney disease and renal function below  a certain threshold, we will use somatostatin receptor imaging in surveillance as the safest method for evaluating neuroendocrine tumors.  She had a gallium 68 PET in 2018 that showed no evidence of somatostatin receptor avid disease within her abdomen. She did have some uptake in her thyroid that has been evaluated and ruled out as anything of significance.  There are handful of reasons I would get follow-up somatostatin receptor imagin.  New or worsening symptoms suggestive of a neuroendocrine syndrome, 2.  Changes in blood work that would suggest a serologic progression of disease or recurrence of disease, or 3.  Findings on conventional cross-sectional imaging like MRI that would suggest recurrence of disease for which an extent of disease evaluation would be required to make treatment recommendations.  3.  Her neuroendocrine blood work is overall within normal limits.  She has an elevated gastrin on her last 2 blood draws that is of unclear significance.  Outside of pancreatic gastrinoma is, we do not commonly use gastrin in surveillance of PNET.  I will have to ask Dr. Orona the rationale for using it in this case.  Her remaining neuroendocrine markers are all within normal limits.  I explained that gastrin is a physiologic marker and only a tumor marker in the setting of gastrinoma is.  Her gastrin level was negative previously and therefore I have no convincing evidence that she had a gastrinoma.  Once Dr Orona has weighed in on the relevance of gastrin in this case, I will either check her neuroendocrine blood work in 3 months to follow the gastrin level or move to 6 months along with an MRI of the abdomen.    MRI of the abdomen in 6 months  Hold on CT scan  Hold on gallium 68 PET  Neuroendocrine blood work in 3 months.  We will mood to 6 months if we do not need to follow the gastrin level    Zuleika Doherty MD, MPH, FACS  Professor of Surgery, [facility]  Neuroendocrine Surgery, Hepatic/Pancreatic  & General Surgical Oncology  [address]  [place]  [address]  ph. [phone]; [phone]  fax. [phone]

## 2020-01-30 ENCOUNTER — OFFICE VISIT (OUTPATIENT)
Dept: NEUROLOGY | Facility: HOSPITAL | Age: 80
End: 2020-01-30
Attending: SURGERY
Payer: MEDICARE

## 2020-01-30 VITALS
HEART RATE: 73 BPM | BODY MASS INDEX: 21.09 KG/M2 | SYSTOLIC BLOOD PRESSURE: 156 MMHG | HEIGHT: 65 IN | DIASTOLIC BLOOD PRESSURE: 74 MMHG | WEIGHT: 126.56 LBS

## 2020-01-30 DIAGNOSIS — D49.89 NEOPLASM OF ABDOMEN: ICD-10-CM

## 2020-01-30 DIAGNOSIS — C77.2 SECONDARY AND UNSPECIFIED MALIGNANT NEOPLASM OF INTRA-ABDOMINAL LYMPH NODES: Primary | ICD-10-CM

## 2020-01-30 DIAGNOSIS — C25.2 MALIGNANT NEOPLASM OF TAIL OF PANCREAS: ICD-10-CM

## 2020-01-30 PROCEDURE — 99214 OFFICE O/P EST MOD 30 MIN: CPT | Performed by: SURGERY

## 2020-01-30 NOTE — PATIENT INSTRUCTIONS
Blood work / Lab work / Tumor markers: due every 3 mos.  --- February 2020 and May 2020  Get lab work drawn at least 1 month prior to appointment.    Scans: MRI Abdomen in 6 mos.  ---  Due late June 2020 / early July 2020  Call Scheduling Department at [phone] to schedule scans prior to next appointment:      Return Clinic Appointment: in 6 months with Dr. Doherty - appointment made

## 2020-02-04 ENCOUNTER — PATIENT MESSAGE (OUTPATIENT)
Dept: NEUROLOGY | Facility: HOSPITAL | Age: 80
End: 2020-02-04

## 2020-07-08 ENCOUNTER — TELEPHONE (OUTPATIENT)
Dept: NEUROLOGY | Facility: HOSPITAL | Age: 80
End: 2020-07-08

## 2020-07-08 NOTE — TELEPHONE ENCOUNTER
Spoke to patient, advised her that she is due for blood work at this time. Schedule patient to have it done at [facility] on Friday coming with her MRI. Educated patient on dates and times of all upcoming appointments. Patient as no further questions at this time.

## 2020-07-10 ENCOUNTER — HOSPITAL ENCOUNTER (OUTPATIENT)
Dept: RADIOLOGY | Facility: HOSPITAL | Age: 80
Discharge: HOME | End: 2020-07-10
Attending: SURGERY
Payer: MEDICARE

## 2020-07-10 DIAGNOSIS — D49.89 NEOPLASM OF ABDOMEN: ICD-10-CM

## 2020-07-10 PROCEDURE — 74183 MRI ABDOMEN W WO CONTRAST: ICD-10-PCS | Mod: 26,,, | Performed by: RADIOLOGY

## 2020-07-10 PROCEDURE — 74183 MRI ABD W/O CNTR FLWD CNTR: CPT | Mod: TC

## 2020-07-10 PROCEDURE — A9585 GADOBUTROL INJECTION: HCPCS | Performed by: SURGERY

## 2020-07-10 PROCEDURE — 25500020 PHARM REV CODE 255: Performed by: SURGERY

## 2020-07-10 PROCEDURE — 74183 MRI ABD W/O CNTR FLWD CNTR: CPT | Mod: 26,,, | Performed by: RADIOLOGY

## 2020-07-10 RX ORDER — GADOBUTROL 604.72 MG/ML
10 INJECTION INTRAVENOUS
Status: COMPLETED | OUTPATIENT
Start: 2020-07-10 | End: 2020-07-10

## 2020-07-10 RX ADMIN — GADOBUTROL 10 ML: 604.72 INJECTION INTRAVENOUS at 10:07

## 2020-07-27 NOTE — PROGRESS NOTES
" NOLANETS:  [facility]  A collaboration between [facility] and [facility]      PATIENT: Rosamaria Aiken  MRN: [id number]  DATE: 7/30/2020    Subjective:      Chief Complaint: pancreatic NET (PNET), in active surveillance, asymptomatic     Overview / HPI: This nice lady has a pancreatic neuroendocrine tumor diagnosed 1/2018.    Interval History:     NET BW (7/2020)  MRI Abdomen (7/2020)    Vitals: Blood pressure (!) 155/65, pulse 62, height 5' 5" (1.651 m), weight 57.4 kg (126 lb 8.7 oz). --stable    ECOG Score: 0 - Asymptomatic    Oncologic History:   Oncologic History Pancreatic net- dx 1/2018   Oncologic Treatment 4/2018- distal panc & spleen (Bart)   Pathology 1/2018- panc FNA- differential includes a neuroendocrine tumor  4/2018- panc- well diff net, G2, unifocal, Ki 67 3-20%, pT2 N1 (6/28 nodes)     Past Medical History:  Past Medical History:   Diagnosis Date    Diabetes     HTN (hypertension)     Pancreatic cancer     Primary pancreatic neuroendocrine tumor     Ki 67- 3-20%       Past Surgical History:  Past Surgical History:   Procedure Laterality Date    BLADDER SUSPENSION      DISTAL PANCREATECTOMY  04/06/2018    [facility] - Dr. Bolton    ENDOSCOPIC ULTRASOUND OF UPPER GASTROINTESTINAL TRACT N/A 6/19/2019    Procedure: ULTRASOUND-ENDOSCOPIC-UPPER;  Surgeon: Savana Luna MD;  Location[facility];  Service: Endoscopy;  Laterality: N/A;  Carcinoid Diagnosis    HYSTERECTOMY         Family History:  No family history on file.    Allergies:  Epinephrine    Medications:  Current Outpatient Medications   Medication Sig    ACCU-CHEK SOFTCLIX LANCETS Misc     amLODIPine (NORVASC) 10 MG tablet Take 10 mg by mouth once daily.    aspirin (ECOTRIN) 81 MG EC tablet Take 81 mg by mouth once daily.    co-enzyme Q-10 30 mg capsule Take 200 mg by mouth 3 (three) times daily.    CREON 24,000-76,000 -120,000 unit " capsule Take 2 capsules by mouth 3 (three) times daily with meals.     cyanocobalamin (VITAMIN B-12) 1000 MCG tablet Take 100 mcg by mouth once daily.    metFORMIN (GLUCOPHAGE) 500 MG tablet Take 500 mg by mouth 2 (two) times daily with meals.    mirtazapine (REMERON) 15 MG tablet Take 15 mg by mouth every evening.    om-3-dha-epa-fish oil-vit D3 [id number] xs-gc-ya-unit Cap Take 2,000 mg by mouth.    omeprazole (PRILOSEC) 20 MG capsule Take 20 mg by mouth once daily.    ondansetron (ZOFRAN-ODT) 8 MG TbDL 8 mg every 12 (twelve) hours as needed.     oxybutynin (DITROPAN-XL) 10 MG 24 hr tablet Take 10 mg by mouth once daily.    POLYETHYLENE GLYCOL 3350 ORAL     VIT E ACET-MIN OIL-DIMETHICONE TOP     vitamin E 400 UNIT capsule Take by mouth.     No current facility-administered medications for this visit.         Review of Systems   Constitutional: Negative for activity change and appetite change.   HENT: Negative.    Eyes: Negative for redness.   Respiratory:        History of pneumonia   Cardiovascular:        High blood pressure   Gastrointestinal: Positive for abdominal pain.   Endocrine:        Type 2 diabetes   Genitourinary:        History of urinary tract infections   Musculoskeletal: Negative for gait problem.   Skin: Negative for pallor and rash.   Allergic/Immunologic: Negative.    Neurological: Negative for tremors and syncope.   Hematological: Negative.    Psychiatric/Behavioral: Negative.           Objective:      Physical Exam  Constitutional:       General: She is not in acute distress.     Appearance: Normal appearance. She is well-developed and normal weight. She is not ill-appearing or diaphoretic.      Comments: petite stature   HENT:      Head: Normocephalic.   Eyes:      Pupils: Pupils are equal, round, and reactive to light.   Cardiovascular:      Rate and Rhythm: Normal rate.   Pulmonary:      Effort: No respiratory distress.      Breath sounds: No stridor.   Skin:     General:  Skin is warm.   Neurological:      Mental Status: She is alert and oriented to person, place, and time.   Psychiatric:         Mood and Affect: Mood normal.         Behavior: Behavior normal.         Thought Content: Thought content normal.         Judgment: Judgment normal.          Laboratory Data:    Neuroendocrine Labs Latest Ref Rng & Units 7/10/2020   GASTRIN <100 pg/mL 143 (H)   EXT GASTRIN <ut=602 pg/ml    SEROTONIN <=230 ng/mL 39       Neuroendocrine Labs Latest Ref Rng & Units 11/6/2019   GASTRIN < OR = 100 pg/mL 269 (H)   EXT GASTRIN <av=701 pg/ml 269   EXT SEROTONIN 56 - 244 ng/ml 116   PANCREASTATIN 10 - 135 pg/mL    EXT PANCREASTATIN KEN 10 - 135 pg/ml 107   EXT GLUCAGON 8 - 57 pg/ml 33   EXT PANCREATIC POLYPEPTIDE pg/ml    EXT VIP 0 - 75 pg/ml    C PEPTIDE 0.80 - 3.85 ng/mL 1.49   EXT C PEPTIDE 0.80 - 3.85 ng/ml 1.49   EXT INSULIN 2.0 - 19.6 uiu/ml 4.0   SOMATOSTATIN pg/mL    EXT SOMATOSTATIN <or=30 pg/ml <21   GHRELIN pg/mL    EXT GHRELIN pg/ml 663   WBC 3.8 - 10.8 Thousand/uL 7.6   EXT WBC 3.8 - 10.8 1000/ul    HGB 11.7 - 15.5 g/dL 13.6   EXT HGB 11.7 - 15.5 g/dl    HCT 35.0 - 45.0 % 42.8   EXT HCT 35.0 - 45.0 %    PLATLETS 140 - 400 Thousand/uL 220   EXT PLATLETS 140 - 400 1000/ul    PTT 24.9 - 35.2 sec    GLUCOSE 65 - 99 mg/dL 113 (H)   EXT GLUCOSE 65 - 99 mg/dl    BUN 7 - 25 mg/dL 24   EXT BUN 7 - 25 mg/dl    CREATININE 0.60 - 0.93 mg/dL 1.02 (H)   EXT CREATININE 0.60 - 0.93 mg/dl     - 146 mmol/L 145   EXT  - 145 mmol/l    K 3.5 - 5.3 mmol/L 4.7   EXT K 3.5 - 5.3 mmol/l    CHLORIDE 98 - 110 mmol/L 107   EXT CHLORIDE 98 - 110 mmol/l    CO2 20 - 32 mmol/L 26   EXT CO2 20 - 32 mmol/l    CALCIUM 8.6 - 10.4 mg/dL 9.9   EXT CALCIUM 8.6 - 10.4 mg/dl    PROTEIN, TOTAL 6.1 - 8.1 g/dL 6.4   EXT PROTEIN, TOTAL 6.1 - 8.1 g/dl    PHOSPHORUS 2.7 - 4.5 mg/dL    ALBUMIN 3.6 - 5.1 g/dL 4.0   EXT ALBUMIN 3.6 - 5.1 g/dl    URIC ACID 2.50 - 6.20 mg/dL    TOTAL BILIRUBIN 0.2 - 1.2 mg/dL 0.6   EXT TOTAL  BILIRUBIN 0.2 - 1.2 mg/dl    ALK PHOSPHATASE 33 - 130 U/L 73   EXT ALK PHOSPHATASE 33 - 130 u/l    SGOT (AST) 10 - 35 U/L 14   EXT SGOT (AST) 10 - 35 u/l    SGPT (ALT) 6 - 29 U/L 9     Scans:   MRI Abdomen W WO Contrast  Narrative: EXAMINATION:  MRI ABDOMEN W WO CONTRAST    CLINICAL HISTORY:  Neoplasm: abdomen, metastatic, recurrence, suspected/known;  Neoplasm of unspecified behavior of other specified sites    TECHNIQUE:  Multiplanar, multi-sequence MR imaging of the abdomen was performed before and after administration of 10 cc of Gadavist gadolinium-based intravenous contrast per the liver protocol.    COMPARISON:  MRI abdomen with and without contrast dated 12/17/2019    FINDINGS:  Pulmonary Bases: No pleural effusion    Liver: No abnormal focus of diffusion restriction or suspicious solid enhancing lesion.  Hepatic vasculature is patent.    Bile Ducts: No biliary dilatation    Gallbladder: Unremarkable    Pancreas: Partial pancreatectomy similar in appearance.    Spleen: Surgically absent    Proximal Gastrointestinal tract: Normal caliber    Mesentery: No discrete mesenteric mass    Adrenal Glands: Unremarkable    Kidneys: Similar appearing T2 hyperintense renal cysts.  Left mid-lower pole cyst with mural nodular focus overall measuring 2.3 cm, unchanged (series 5, image 6).  No hydronephrosis.    Aorta and Abdominal Vasculature: The aorta, IVC, and SMV appear patent.    Lymph nodes: no pathologically enlarged lymph node.    Body wall: Prior midline laparotomy.    Other: No upper abdominal ascites.  No suspicious marrow enhancing lesion.  Similar probable hemangioma at L4.  Impression: No significant interval detrimental change.  No evidence for metastatic disease.    Partial pancreatectomy with splenectomy.    There are no measurable lesions per RECIST criteria.    Electronically signed by: Jimy Lynn  Date:    07/10/2020  Time:    11:16      CT Abdomen Pelvis With Contrast-12/17/19  Narrative:  EXAMINATION:  CT ABDOMEN PELVIS WITH CONTRAST    CLINICAL HISTORY:  Neoplasm: abdomen, metastatic, recurrence, suspected/known; Neoplasm of unspecified behavior of other specified sites    TECHNIQUE:  Low dose axial images, sagittal and coronal reformations were obtained from the lung bases to the pubic symphysis following the IV administration of 75 mL of Omnipaque 350 and the oral administration of 30 mL of Omnipaque 350.    COMPARISON:  05/17/2019    FINDINGS:  The lung bases are clear.  No liver lesions.  The gallbladder is present.  The stomach, adrenal glands and bilateral kidneys appear normal.  Bilateral kidney cysts.  Prior partial pancreatectomy.  Splenectomy.  There is atherosclerotic plaque of the aorta, it tapers normally, no para-aortic lymphadenopathy.  The mesentery appears normal.  Moderate stool retention, no inflammatory changes of the bowel seen, no bowel dilation.  The bladder appears normal.  The uterus has been removed.  The ovaries are not seen.  No ascites.  The inguinal regions appear normal.  The osseous structures demonstrate no destructive osseous lesions.  Impression: No interval detrimental change from the prior study.    No evidence of metastatic disease.    Bilateral kidney cysts.    Prior partial pancreatectomy.    Splenectomy.    There are no measurable lesions per RECIST criteria.      MRI Abdomen W WO Contrast-12/17/19  Narrative: EXAMINATION:  MRI ABDOMEN W WO CONTRAST    CLINICAL HISTORY:  Neoplasm: abdomen, metastatic, recurrence, suspected/known;  Neoplasm of unspecified behavior of other specified sites    TECHNIQUE:  Multiplanar multi sequences images of the abdomen obtained, the patient received 10 cc of Gadavist IV contrast.    COMPARISON:  05/17/2019    FINDINGS:  The lung bases appear normal.  No liver lesion seen.  The gallbladder is present.  The stomach appears within normal limits.  Partial pancreatectomy.  Splenectomy.  The aorta tapers normally, no para-aortic  lymphadenopathy.  Bilateral kidney simple cysts larger on the right.  The adrenal glands appear normal.  No ascites.  Benign-appearing bone lesion likely hemangioma, S1 and L4 vertebrae.  Impression: No evidence of metastatic disease.    Bilateral kidney cysts.    Prior partial pancreatectomy.  Splenectomy.    There are no measurable lesions per RECIST criteria.      NM PET 68GA DOTATATE WHOLE BODY-5/17/19    CLINICAL HISTORY:  Malignant neoplasm of tail of pancreas    TECHNIQUE:  4.63 mCi of GA68 Dotatate was administered intravenously in the left antecubital fossa.  After an approximately 60 min distribution time, PET/CT images were acquired from the skull vertex to the mid thigh. Transmission images were acquired to correct for attenuation using a whole body low-dose CT scan without contrast with the arms positioned above the head.    COMPARISON:  CT abdomen/pelvis from 11/19/2018.  MRI abdomen from 11/19/2018.  Gallium PET-CT from 06/04/2018.    FINDINGS:  Quality of the study: Adequate.    Focal radiotracer uptake in a 0.8 cm soft tissue nodule inferior to the left lobe of the thyroid with SUV max of 3.36.  No additional abnormal radiotracer uptake.    Physiologic uptake of the tracer is seen within the pituitary, liver, spleen, kidneys, adrenal glands and bowel.    Incidental CT findings: Coronary atherosclerosis. Postoperative changes noted status post distal pancreatectomy and splenectomy. Stable bilateral renal cysts.      Impression       No abnormal tracer uptake to suggest local recurrence or metastatic disease.    Focal radiotracer uptake in a subcentimeter soft tissue nodule inferior to the left lobe of thyroid.  Findings are nonspecific, could possibly represent parathyroid hyperplasia or adenoma.  Recommend further evaluation with dedicated ultrasound.          Assessment/Impression:         Problem List Items Addressed This Visit     None      Visit Diagnoses     Neuroendocrine carcinoma of pancreas     -  Primary    Relevant Orders    Serotonin serum    Pancreastatin    Gastrin    Malignant neoplasm of ill-defined sites within the digestive system        Relevant Orders    MRI Abdomen With Contrast    Secondary neuroendocrine tumor of distant lymph nodes        Relevant Orders    Serotonin serum    Pancreastatin    Gastrin           Plan:     I had a long conversation with the patient about the features of her case that support the treatment and surveillance recommendations outlined below:  1.  When she and I met in January of this year, we reviewed the natural history of a grade 2, well-differentiated, pancreatic neuroendocrine tumor.  She was node positive but not metastatic at presentation.  The favorable aspects of the disease include a slow in indolent biologic behavior and patients can live a long time even when all measurable disease cannot be removed.  The less favorable aspects of the disease include an ongoing risk of local regional recurrence and or the development of distant metastatic disease over the patient's remaining lifetime.  These recurrences can occur even years after complete resection of the primary tumor.  It is the grade 2 histopathology and the node positivity that weigh into her recurrence risk.  2.  She has neuroendocrine blood work that is relatively normal.  She has a slight elevation in her gastrin level that was also elevated in late 2019.  This can be related to neuroendocrine indirectly since disorders of the upper digestive track can lead to abnormal stimulation of the neuroendocrine system.  In addition medications used to treat GI disorders of the stomach and duodenum can lead to elevations in gastrin that are unrelated to any neuroendocrine tumor.  3.  She had an MRI of the abdomen that shows no evidence of local regional recurrence or changes within her liver to suggest metastatic disease.  Unless she develops other comorbid conditions that are clear 's of her  mortality, our next surveillance MRI would be in 6 months.  4.  She has previously had a gallium 68 PET that showed no evidence of somatostatin receptor avid disease within her abdomen.  I reiterated to her at this visit the handful of reasons I would consider getting follow-up somatostatin receptor PET imaging; 1 new or worsening symptoms suggestive of an evolution of a neuroendocrine syndrome, 2.  Changes in blood work that would suggest a serologic progression of disease, or 3.  New findings on conventional cross-sectional imaging that would suggest recurrence and for which an extent of disease evaluation would be necessary to make treatment recommendations.      NET BW in 6 months  MRI in 6 months  Continue to hold on gallium 68 PET  Continue to hold on CT scan  Hold on echocardiogram for this indication    Zuleika Doherty MD, MPH, FACS  Professor of Surgery, [facility]  Neuroendocrine Surgery, Hepatic/Pancreatic & General Surgical Oncology  [address]  [place]  [address]  ph. [phone]; [phone]  fax. [phone]

## 2020-07-30 ENCOUNTER — OFFICE VISIT (OUTPATIENT)
Dept: NEUROLOGY | Facility: HOSPITAL | Age: 80
End: 2020-07-30
Attending: SURGERY
Payer: MEDICARE

## 2020-07-30 VITALS
BODY MASS INDEX: 21.09 KG/M2 | HEIGHT: 65 IN | HEART RATE: 62 BPM | WEIGHT: 126.56 LBS | SYSTOLIC BLOOD PRESSURE: 155 MMHG | DIASTOLIC BLOOD PRESSURE: 65 MMHG

## 2020-07-30 DIAGNOSIS — D3A.8 NEUROENDOCRINE TUMOR OF PANCREAS: ICD-10-CM

## 2020-07-30 DIAGNOSIS — C7B.8 SECONDARY NEUROENDOCRINE TUMOR OF DISTANT LYMPH NODES: ICD-10-CM

## 2020-07-30 DIAGNOSIS — R63.4 WEIGHT LOSS, UNINTENTIONAL: ICD-10-CM

## 2020-07-30 DIAGNOSIS — C26.9 MALIGNANT NEOPLASM OF ILL-DEFINED SITES WITHIN THE DIGESTIVE SYSTEM: ICD-10-CM

## 2020-07-30 DIAGNOSIS — C7A.8 NEUROENDOCRINE CARCINOMA OF PANCREAS: Primary | ICD-10-CM

## 2020-07-30 PROBLEM — C25.4 MALIGNANT NEOPLASM OF ISLETS OF LANGERHANS: Status: RESOLVED | Noted: 2018-06-06 | Resolved: 2020-07-30

## 2020-07-30 PROBLEM — C77.2 SECONDARY AND UNSPECIFIED MALIGNANT NEOPLASM OF INTRA-ABDOMINAL LYMPH NODES: Status: RESOLVED | Noted: 2018-06-06 | Resolved: 2020-07-30

## 2020-07-30 PROBLEM — C25.2: Status: RESOLVED | Noted: 2018-06-06 | Resolved: 2020-07-30

## 2020-07-30 PROCEDURE — 99214 OFFICE O/P EST MOD 30 MIN: CPT | Performed by: SURGERY

## 2020-07-30 NOTE — PATIENT INSTRUCTIONS
Tumor markers: every 6 months  --- due December 2020  Get lab work drawn at least 1 month prior to appointment.    Scans: MRI liver in 6 months  ---  due January 2020  Continue to hold on gallium 68 PET  Continue to hold on CT scan  Call Scheduling Department at [phone] to schedule scans prior to next appointment:      Echo: Hold on echocardiogram for this indication    Return Clinic Appointment: in 6 months with Dr. Doherty - appointment made

## 2020-08-26 ENCOUNTER — OFFICE VISIT (OUTPATIENT)
Dept: URGENT CARE | Facility: CLINIC | Age: 80
End: 2020-08-26
Payer: MEDICARE

## 2020-08-26 VITALS
DIASTOLIC BLOOD PRESSURE: 78 MMHG | HEART RATE: 69 BPM | WEIGHT: 126 LBS | OXYGEN SATURATION: 97 % | HEIGHT: 65 IN | SYSTOLIC BLOOD PRESSURE: 177 MMHG | TEMPERATURE: 98 F | BODY MASS INDEX: 20.99 KG/M2 | RESPIRATION RATE: 16 BRPM

## 2020-08-26 DIAGNOSIS — S70.02XA CONTUSION, HIP AND THIGH, LEFT, INITIAL ENCOUNTER: ICD-10-CM

## 2020-08-26 DIAGNOSIS — S70.12XA CONTUSION, HIP AND THIGH, LEFT, INITIAL ENCOUNTER: ICD-10-CM

## 2020-08-26 DIAGNOSIS — M25.552 PAIN OF LEFT HIP JOINT: Primary | ICD-10-CM

## 2020-08-26 DIAGNOSIS — M53.3 TENDERNESS OF SACROILIAC JOINT: ICD-10-CM

## 2020-08-26 PROBLEM — C25.7 MALIGNANT NEOPLASM OF OTHER PARTS OF PANCREAS: Status: ACTIVE | Noted: 2018-06-06

## 2020-08-26 PROCEDURE — 99204 PR OFFICE/OUTPT VISIT, NEW, LEVL IV, 45-59 MIN: ICD-10-PCS | Mod: S$GLB,,, | Performed by: INTERNAL MEDICINE

## 2020-08-26 PROCEDURE — 73552 XR FEMUR 2 VIEW LEFT: ICD-10-PCS | Mod: FY,LT,S$GLB, | Performed by: RADIOLOGY

## 2020-08-26 PROCEDURE — 73552 X-RAY EXAM OF FEMUR 2/>: CPT | Mod: FY,LT,S$GLB, | Performed by: RADIOLOGY

## 2020-08-26 PROCEDURE — 99204 OFFICE O/P NEW MOD 45 MIN: CPT | Mod: S$GLB,,, | Performed by: INTERNAL MEDICINE

## 2020-08-26 RX ORDER — NAPROXEN 250 MG/1
250 TABLET ORAL 2 TIMES DAILY WITH MEALS
Qty: 12 TABLET | Refills: 0 | Status: SHIPPED | OUTPATIENT
Start: 2020-08-26 | End: 2020-09-01

## 2020-08-26 RX ORDER — PREDNISONE 10 MG/1
10 TABLET ORAL DAILY
Qty: 5 TABLET | Refills: 0 | Status: SHIPPED | OUTPATIENT
Start: 2020-08-26 | End: 2020-08-31

## 2020-08-26 NOTE — PROGRESS NOTES
"Subjective:       Patient ID: Rosamaria Aiken is a 79 y.o. female.    Vitals:  height is 5' 5" (1.651 m) and weight is 57.2 kg (126 lb). Her tympanic temperature is 98.4 °F (36.9 °C). Her blood pressure is 177/78 (abnormal) and her pulse is 69. Her respiration is 16 and oxygen saturation is 97%.     Chief Complaint: Leg Pain (LLeft)    Leg Pain   The incident occurred more than 1 week ago. The incident occurred at home. The injury mechanism was a fall. The pain is present in the left thigh. The quality of the pain is described as aching. The pain is at a severity of 5/10. The pain is mild. Associated symptoms include an inability to bear weight and a loss of motion. The symptoms are aggravated by movement. Treatments tried: Prescription pain relievers prescribed a little over one week ago. The treatment provided no relief.       Constitution: Negative for chills, fatigue and fever.   HENT: Negative for congestion and sore throat.    Neck: Negative for painful lymph nodes.   Cardiovascular: Negative for chest pain and leg swelling.   Eyes: Negative for double vision and blurred vision.   Respiratory: Negative for cough and shortness of breath.    Gastrointestinal: Negative for nausea, vomiting and diarrhea.   Genitourinary: Negative for dysuria, frequency, urgency and history of kidney stones.   Musculoskeletal: Negative for joint pain, joint swelling, muscle cramps and muscle ache.   Skin: Negative for color change, pale, rash and bruising.   Allergic/Immunologic: Negative for seasonal allergies.   Neurological: Negative for dizziness, history of vertigo, light-headedness, passing out and headaches.   Hematologic/Lymphatic: Negative for swollen lymph nodes.   Psychiatric/Behavioral: Negative for nervous/anxious, sleep disturbance and depression. The patient is not nervous/anxious.        Objective:      Physical Exam   Constitutional: She is oriented to person, place, and time. She appears well-developed. She is " cooperative.  Non-toxic appearance. She does not appear ill. No distress.   HENT:   Head: Normocephalic and atraumatic.   Ears:   Right Ear: Hearing, tympanic membrane, external ear and ear canal normal. Tympanic membrane is not injected.   Left Ear: Hearing, tympanic membrane, external ear and ear canal normal. Tympanic membrane is not injected.   Nose: Nose normal. No mucosal edema, rhinorrhea, purulent discharge or nasal deformity. No epistaxis. Right sinus exhibits no maxillary sinus tenderness and no frontal sinus tenderness. Left sinus exhibits no maxillary sinus tenderness and no frontal sinus tenderness.   Mouth/Throat: Uvula is midline, oropharynx is clear and moist and mucous membranes are normal. No trismus in the jaw. Normal dentition. No uvula swelling. No oropharyngeal exudate, posterior oropharyngeal edema or posterior oropharyngeal erythema.   Eyes: Conjunctivae and lids are normal. No scleral icterus.   Neck: Trachea normal, full passive range of motion without pain and phonation normal. Neck supple. No neck rigidity. No edema and no erythema present.   Cardiovascular: Normal rate, regular rhythm, S1 normal, S2 normal, normal heart sounds and normal pulses.   No murmur heard.  Pulmonary/Chest: Effort normal and breath sounds normal. No accessory muscle usage. No respiratory distress. She has no decreased breath sounds. She has no wheezes. She has no rhonchi. She has no rales.   Abdominal: Normal appearance.   Musculoskeletal:         General: No deformity.      Left hip: She exhibits decreased range of motion, tenderness (inward rotation of the hip ) and bony tenderness. She exhibits normal strength, no swelling, no crepitus and no deformity.        Legs:    Neurological: She is alert and oriented to person, place, and time. She exhibits normal muscle tone. Coordination normal.   Skin: Skin is warm, dry, intact, not diaphoretic and not pale. Psychiatric: Her speech is normal and behavior is normal.  Judgment and thought content normal.   Nursing note and vitals reviewed.        Assessment:       1. Pain of left hip joint    2. Tenderness of sacroiliac joint    3. Contusion, hip and thigh, left, initial encounter        Plan:         Pain of left hip joint  -     XR FEMUR 2 VIEW LEFT; Future; Expected date: 08/26/2020    Tenderness of sacroiliac joint    Contusion, hip and thigh, left, initial encounter

## 2020-12-09 LAB
EXT 24 HR UR METANEPHRINE: ABNORMAL
EXT 24 HR UR NORMETANEPHRINE: ABNORMAL
EXT 24 HR UR NORMETANEPHRINE: ABNORMAL
EXT 25 HYDROXY VIT D2: ABNORMAL
EXT 25 HYDROXY VIT D3: ABNORMAL
EXT 5 HIAA 24 HR URINE: ABNORMAL
EXT 5 HIAA BLOOD: ABNORMAL
EXT ACTH: ABNORMAL
EXT AFP: ABNORMAL
EXT ALBUMIN: ABNORMAL
EXT ALKALINE PHOSPHATASE: ABNORMAL
EXT ALT: ABNORMAL
EXT AMYLASE: ABNORMAL
EXT ANTI ISLET CELL AB: ABNORMAL
EXT ANTI PARIETAL CELL AB: ABNORMAL
EXT ANTI THYROID AB: ABNORMAL
EXT AST: ABNORMAL
EXT BILIRUBIN DIRECT: ABNORMAL
EXT BILIRUBIN TOTAL: ABNORMAL
EXT BK VIRUS DNA QN PCR: ABNORMAL
EXT BUN: ABNORMAL
EXT C PEPTIDE: ABNORMAL
EXT CA 125: ABNORMAL
EXT CA 19-9: ABNORMAL
EXT CA 27-29: ABNORMAL
EXT CALCITONIN: ABNORMAL
EXT CALCIUM: ABNORMAL
EXT CEA: ABNORMAL
EXT CHLORIDE: ABNORMAL
EXT CHOLESTEROL: ABNORMAL
EXT CHROMOGRANIN A: ABNORMAL
EXT CO2: ABNORMAL
EXT CREATININE UA: ABNORMAL
EXT CREATININE: ABNORMAL
EXT CYCLOSPORONE LEVEL: ABNORMAL
EXT DOPAMINE: ABNORMAL
EXT EBV DNA BY PCR: ABNORMAL
EXT EPINEPHRINE: ABNORMAL
EXT FOLATE: ABNORMAL
EXT FREE T3: ABNORMAL
EXT FREE T4: ABNORMAL
EXT FSH: ABNORMAL
EXT GASTRIN RELEASING PEPTIDE: ABNORMAL
EXT GASTRIN RELEASING PEPTIDE: ABNORMAL
EXT GASTRIN: 183 PG/ML (ref 0–115)
EXT GGT: ABNORMAL
EXT GHRELIN: ABNORMAL
EXT GLUCAGON: ABNORMAL
EXT GLUCOSE: ABNORMAL
EXT GROWTH HORMONE: ABNORMAL
EXT HCV RNA QUANT PCR: ABNORMAL
EXT HDL: ABNORMAL
EXT HEMATOCRIT: ABNORMAL
EXT HEMOGLOBIN A1C: ABNORMAL
EXT HEMOGLOBIN: ABNORMAL
EXT HISTAMINE 24 HR URINE: ABNORMAL
EXT HISTAMINE: ABNORMAL
EXT IGF-1: ABNORMAL
EXT IMMUNKNOW (NON-STIMULATED): ABNORMAL
EXT IMMUNKNOW (STIMULATED): ABNORMAL
EXT INR: ABNORMAL
EXT INSULIN: ABNORMAL
EXT LANREOTIDE LEVEL: ABNORMAL
EXT LDH, TOTAL: ABNORMAL
EXT LDL CHOLESTEROL: ABNORMAL
EXT LIPASE: ABNORMAL
EXT MAGNESIUM: ABNORMAL
EXT METANEPHRINE FREE PLASMA: ABNORMAL
EXT MOTILIN: ABNORMAL
EXT NEUROKININ A CAMB: ABNORMAL
EXT NEUROKININ A ISI: ABNORMAL
EXT NEUROTENSIN: ABNORMAL
EXT NOREPINEPHRINE: ABNORMAL
EXT NORMETANEPHRINE: ABNORMAL
EXT NSE: ABNORMAL
EXT OCTREOTIDE LEVEL: ABNORMAL
EXT PANCREASTATIN CAMB: ABNORMAL
EXT PANCREASTATIN ISI: 107 PG/ML (ref 10–135)
EXT PANCREATIC POLYPEPTIDE: ABNORMAL
EXT PHOSPHORUS: ABNORMAL
EXT PLATELETS: ABNORMAL
EXT POTASSIUM: ABNORMAL
EXT PROGRAF LEVEL: ABNORMAL
EXT PROLACTIN: ABNORMAL
EXT PROTEIN TOTAL: ABNORMAL
EXT PROTEIN UA: ABNORMAL
EXT PT: ABNORMAL
EXT PTH, INTACT: ABNORMAL
EXT PTT: ABNORMAL
EXT RAPAMUNE LEVEL: ABNORMAL
EXT SEROTONIN: 32 NG/ML (ref 0–420)
EXT SODIUM: ABNORMAL
EXT SOMATOSTATIN: ABNORMAL
EXT SUBSTANCE P: ABNORMAL
EXT TRIGLYCERIDES: ABNORMAL
EXT TRYPTASE: ABNORMAL
EXT TSH: ABNORMAL
EXT URIC ACID: ABNORMAL
EXT URINE AMYLASE U/HR: ABNORMAL
EXT URINE AMYLASE U/L: ABNORMAL
EXT VASOACTIVE INTESTINAL POLYPEPTIDE: ABNORMAL
EXT VITAMIN B12: ABNORMAL
EXT VMA 24 HR URINE: ABNORMAL
EXT WBC: ABNORMAL
NEURON SPECIFIC ENOLASE: ABNORMAL

## 2021-01-21 ENCOUNTER — HOSPITAL ENCOUNTER (OUTPATIENT)
Dept: RADIOLOGY | Facility: HOSPITAL | Age: 81
Discharge: HOME | End: 2021-01-21
Attending: SURGERY
Payer: MEDICARE

## 2021-01-21 DIAGNOSIS — C26.9 MALIGNANT NEOPLASM OF ILL-DEFINED SITES WITHIN THE DIGESTIVE SYSTEM: ICD-10-CM

## 2021-01-21 LAB
CREAT SERPL-MCNC: 1 MG/DL (ref 0.5–1.4)
SAMPLE: NORMAL

## 2021-01-21 PROCEDURE — A9585 GADOBUTROL INJECTION: HCPCS | Performed by: SURGERY

## 2021-01-21 PROCEDURE — 74183 MRI ABDOMEN W WO CONTRAST: ICD-10-PCS | Mod: 26,,, | Performed by: RADIOLOGY

## 2021-01-21 PROCEDURE — 74183 MRI ABD W/O CNTR FLWD CNTR: CPT | Mod: 26,,, | Performed by: RADIOLOGY

## 2021-01-21 PROCEDURE — 74183 MRI ABD W/O CNTR FLWD CNTR: CPT | Mod: TC

## 2021-01-21 PROCEDURE — 25500020 PHARM REV CODE 255: Performed by: SURGERY

## 2021-01-21 RX ORDER — GADOBUTROL 604.72 MG/ML
10 INJECTION INTRAVENOUS
Status: COMPLETED | OUTPATIENT
Start: 2021-01-21 | End: 2021-01-21

## 2021-01-21 RX ADMIN — GADOBUTROL 10 ML: 604.72 INJECTION INTRAVENOUS at 10:01

## 2021-02-01 ENCOUNTER — TELEPHONE (OUTPATIENT)
Dept: NEUROLOGY | Facility: HOSPITAL | Age: 81
End: 2021-02-01

## 2021-02-02 ENCOUNTER — OFFICE VISIT (OUTPATIENT)
Dept: NEUROLOGY | Facility: HOSPITAL | Age: 81
End: 2021-02-02
Attending: SURGERY
Payer: MEDICARE

## 2021-02-02 VITALS
HEART RATE: 67 BPM | HEIGHT: 65 IN | WEIGHT: 128.75 LBS | DIASTOLIC BLOOD PRESSURE: 72 MMHG | BODY MASS INDEX: 21.45 KG/M2 | TEMPERATURE: 97 F | SYSTOLIC BLOOD PRESSURE: 159 MMHG

## 2021-02-02 DIAGNOSIS — C7B.8 SECONDARY NEUROENDOCRINE TUMOR OF DISTANT LYMPH NODES: ICD-10-CM

## 2021-02-02 DIAGNOSIS — D3A.8 NEUROENDOCRINE TUMOR OF PANCREAS: ICD-10-CM

## 2021-02-02 DIAGNOSIS — Z12.89 ENCOUNTER FOR SCREENING FOR MALIGNANT NEOPLASM OF OTHER SITES: ICD-10-CM

## 2021-02-02 DIAGNOSIS — D3A.8 PRIMARY PANCREATIC NEUROENDOCRINE TUMOR: Primary | ICD-10-CM

## 2021-02-02 PROBLEM — K86.9 PANCREATIC LESION: Status: RESOLVED | Noted: 2019-06-19 | Resolved: 2021-02-02

## 2021-02-02 PROBLEM — C25.7 MALIGNANT NEOPLASM OF OTHER PARTS OF PANCREAS: Status: RESOLVED | Noted: 2018-06-06 | Resolved: 2021-02-02

## 2021-02-02 PROCEDURE — 99215 OFFICE O/P EST HI 40 MIN: CPT | Performed by: SURGERY

## 2021-02-06 ENCOUNTER — OFFICE VISIT (OUTPATIENT)
Dept: URGENT CARE | Facility: CLINIC | Age: 81
End: 2021-02-06
Payer: MEDICARE

## 2021-02-06 VITALS
SYSTOLIC BLOOD PRESSURE: 188 MMHG | RESPIRATION RATE: 16 BRPM | WEIGHT: 128 LBS | TEMPERATURE: 98 F | DIASTOLIC BLOOD PRESSURE: 79 MMHG | BODY MASS INDEX: 21.3 KG/M2 | HEART RATE: 75 BPM | OXYGEN SATURATION: 98 %

## 2021-02-06 DIAGNOSIS — M10.9 ACUTE GOUT INVOLVING TOE OF RIGHT FOOT, UNSPECIFIED CAUSE: Primary | ICD-10-CM

## 2021-02-06 PROCEDURE — 99214 OFFICE O/P EST MOD 30 MIN: CPT | Mod: 25,S$GLB,, | Performed by: NURSE PRACTITIONER

## 2021-02-06 PROCEDURE — 99214 PR OFFICE/OUTPT VISIT, EST, LEVL IV, 30-39 MIN: ICD-10-PCS | Mod: 25,S$GLB,, | Performed by: NURSE PRACTITIONER

## 2021-02-06 PROCEDURE — 96372 THER/PROPH/DIAG INJ SC/IM: CPT | Mod: S$GLB,,, | Performed by: NURSE PRACTITIONER

## 2021-02-06 PROCEDURE — 96372 PR INJECTION,THERAP/PROPH/DIAG2ST, IM OR SUBCUT: ICD-10-PCS | Mod: S$GLB,,, | Performed by: NURSE PRACTITIONER

## 2021-02-06 PROCEDURE — 1157F PR ADVANCE CARE PLAN OR EQUIV PRESENT IN MEDICAL RECORD: ICD-10-PCS | Mod: S$GLB,,, | Performed by: NURSE PRACTITIONER

## 2021-02-06 PROCEDURE — 1157F ADVNC CARE PLAN IN RCRD: CPT | Mod: S$GLB,,, | Performed by: NURSE PRACTITIONER

## 2021-02-06 RX ORDER — COLCHICINE 0.6 MG/1
0.6 TABLET ORAL DAILY
Qty: 30 TABLET | Refills: 11 | Status: SHIPPED | OUTPATIENT
Start: 2021-02-06 | End: 2021-02-06

## 2021-02-06 RX ORDER — COLCHICINE 0.6 MG/1
TABLET ORAL
Qty: 3 TABLET | Refills: 0 | Status: SHIPPED | OUTPATIENT
Start: 2021-02-06 | End: 2021-12-17

## 2021-02-06 RX ORDER — COLCHICINE 0.6 MG/1
TABLET ORAL
Qty: 3 TABLET | Refills: 0 | Status: SHIPPED | OUTPATIENT
Start: 2021-02-06 | End: 2021-02-06

## 2021-02-06 RX ORDER — KETOROLAC TROMETHAMINE 30 MG/ML
30 INJECTION, SOLUTION INTRAMUSCULAR; INTRAVENOUS
Status: COMPLETED | OUTPATIENT
Start: 2021-02-06 | End: 2021-02-06

## 2021-02-06 RX ADMIN — KETOROLAC TROMETHAMINE 30 MG: 30 INJECTION, SOLUTION INTRAMUSCULAR; INTRAVENOUS at 11:02

## 2021-07-23 ENCOUNTER — TELEPHONE (OUTPATIENT)
Dept: NEUROLOGY | Facility: HOSPITAL | Age: 81
End: 2021-07-23

## 2021-07-28 ENCOUNTER — TELEPHONE (OUTPATIENT)
Dept: NEUROLOGY | Facility: HOSPITAL | Age: 81
End: 2021-07-28

## 2021-08-03 ENCOUNTER — LAB VISIT (OUTPATIENT)
Dept: LAB | Facility: HOSPITAL | Age: 81
End: 2021-08-03
Attending: SURGERY
Payer: MEDICARE

## 2021-08-03 DIAGNOSIS — C7A.8 NEUROENDOCRINE CARCINOMA OF PANCREAS: ICD-10-CM

## 2021-08-03 DIAGNOSIS — C7B.8 SECONDARY NEUROENDOCRINE TUMOR OF DISTANT LYMPH NODES: ICD-10-CM

## 2021-08-03 DIAGNOSIS — D3A.8 PRIMARY PANCREATIC NEUROENDOCRINE TUMOR: ICD-10-CM

## 2021-08-03 LAB
ALBUMIN SERPL BCP-MCNC: 3.6 G/DL (ref 3.5–5.2)
ALP SERPL-CCNC: 68 U/L (ref 55–135)
ALT SERPL W/O P-5'-P-CCNC: 8 U/L (ref 10–44)
ANION GAP SERPL CALC-SCNC: 10 MMOL/L (ref 8–16)
AST SERPL-CCNC: 12 U/L (ref 10–40)
BILIRUB SERPL-MCNC: 0.4 MG/DL (ref 0.1–1)
BUN SERPL-MCNC: 22 MG/DL (ref 8–23)
CALCIUM SERPL-MCNC: 9.7 MG/DL (ref 8.7–10.5)
CHLORIDE SERPL-SCNC: 111 MMOL/L (ref 95–110)
CO2 SERPL-SCNC: 23 MMOL/L (ref 23–29)
CREAT SERPL-MCNC: 1 MG/DL (ref 0.5–1.4)
EST. GFR  (AFRICAN AMERICAN): >60 ML/MIN/1.73 M^2
EST. GFR  (NON AFRICAN AMERICAN): 53 ML/MIN/1.73 M^2
GLUCOSE SERPL-MCNC: 134 MG/DL (ref 70–110)
POTASSIUM SERPL-SCNC: 4.5 MMOL/L (ref 3.5–5.1)
PROT SERPL-MCNC: 6.7 G/DL (ref 6–8.4)
SODIUM SERPL-SCNC: 144 MMOL/L (ref 136–145)

## 2021-08-03 PROCEDURE — 36415 COLL VENOUS BLD VENIPUNCTURE: CPT | Performed by: SURGERY

## 2021-08-03 PROCEDURE — 82941 ASSAY OF GASTRIN: CPT | Performed by: SURGERY

## 2021-08-03 PROCEDURE — 86316 IMMUNOASSAY TUMOR OTHER: CPT | Performed by: SURGERY

## 2021-08-03 PROCEDURE — 80053 COMPREHEN METABOLIC PANEL: CPT | Performed by: SURGERY

## 2021-08-03 PROCEDURE — 84260 ASSAY OF SEROTONIN: CPT | Performed by: SURGERY

## 2021-08-05 LAB — GASTRIN SERPL-MCNC: 167 PG/ML

## 2021-08-06 LAB
SEROTONIN: 67 NG/ML
SEROTONIN: 67 NG/ML

## 2021-08-19 LAB
PANCREASTATIN SERPL-MCNC: 144 PG/ML (ref 10–135)
PANCREASTATIN SERPL-MCNC: 144 PG/ML (ref 10–135)

## 2021-10-26 ENCOUNTER — OFFICE VISIT (OUTPATIENT)
Dept: NEUROLOGY | Facility: HOSPITAL | Age: 81
End: 2021-10-26
Attending: SURGERY
Payer: MEDICARE

## 2021-10-26 ENCOUNTER — HOSPITAL ENCOUNTER (OUTPATIENT)
Dept: RADIOLOGY | Facility: HOSPITAL | Age: 81
Discharge: HOME | End: 2021-10-26
Attending: SURGERY
Payer: MEDICARE

## 2021-10-26 VITALS
HEIGHT: 65 IN | WEIGHT: 124.44 LBS | HEART RATE: 75 BPM | BODY MASS INDEX: 20.73 KG/M2 | SYSTOLIC BLOOD PRESSURE: 138 MMHG | DIASTOLIC BLOOD PRESSURE: 69 MMHG | TEMPERATURE: 99 F

## 2021-10-26 DIAGNOSIS — D3A.8 NEUROENDOCRINE TUMOR OF PANCREAS: Primary | ICD-10-CM

## 2021-10-26 DIAGNOSIS — C7B.01 SECONDARY CARCINOID TUMOR OF DISTANT LYMPH NODES: ICD-10-CM

## 2021-10-26 DIAGNOSIS — C7B.8 SECONDARY NEUROENDOCRINE TUMOR OF DISTANT LYMPH NODES: ICD-10-CM

## 2021-10-26 DIAGNOSIS — Z12.89 ENCOUNTER FOR SCREENING FOR MALIGNANT NEOPLASM OF OTHER SITES: ICD-10-CM

## 2021-10-26 LAB
CREAT SERPL-MCNC: 0.6 MG/DL (ref 0.5–1.4)
SAMPLE: NORMAL

## 2021-10-26 PROCEDURE — 99215 OFFICE O/P EST HI 40 MIN: CPT | Mod: 25 | Performed by: SURGERY

## 2021-10-26 PROCEDURE — 74183 MRI ABD W/O CNTR FLWD CNTR: CPT | Mod: 26,,, | Performed by: RADIOLOGY

## 2021-10-26 PROCEDURE — A9585 GADOBUTROL INJECTION: HCPCS | Performed by: SURGERY

## 2021-10-26 PROCEDURE — 74183 MRI ABDOMEN W WO CONTRAST: ICD-10-PCS | Mod: 26,,, | Performed by: RADIOLOGY

## 2021-10-26 PROCEDURE — 25500020 PHARM REV CODE 255: Performed by: SURGERY

## 2021-10-26 PROCEDURE — 74183 MRI ABD W/O CNTR FLWD CNTR: CPT | Mod: TC

## 2021-10-26 RX ORDER — GADOBUTROL 604.72 MG/ML
10 INJECTION INTRAVENOUS
Status: COMPLETED | OUTPATIENT
Start: 2021-10-26 | End: 2021-10-26

## 2021-10-26 RX ORDER — CALCIUM CITRATE/VITAMIN D3 200MG-6.25
TABLET ORAL
COMMUNITY
Start: 2021-08-19

## 2021-10-26 RX ADMIN — GADOBUTROL 10 ML: 604.72 INJECTION INTRAVENOUS at 11:10

## 2021-12-28 PROBLEM — I42.9 CARDIOMYOPATHY: Status: ACTIVE | Noted: 2021-12-28

## 2021-12-28 PROBLEM — I51.9 LEFT VENTRICULAR DYSFUNCTION: Status: ACTIVE | Noted: 2021-12-28

## 2021-12-28 PROBLEM — I50.21 ACUTE SYSTOLIC CHF (CONGESTIVE HEART FAILURE), NYHA CLASS 2: Status: ACTIVE | Noted: 2021-12-28

## 2022-02-02 PROBLEM — I42.8 NON-ISCHEMIC CARDIOMYOPATHY: Status: ACTIVE | Noted: 2021-12-28

## 2022-02-02 PROBLEM — I44.7 LBBB (LEFT BUNDLE BRANCH BLOCK): Status: ACTIVE | Noted: 2022-02-02

## 2022-02-02 PROBLEM — I50.22 CHRONIC SYSTOLIC CHF (CONGESTIVE HEART FAILURE), NYHA CLASS 2: Status: ACTIVE | Noted: 2022-02-02

## 2022-02-22 DIAGNOSIS — D84.9 IMMUNOSUPPRESSED STATUS: ICD-10-CM

## 2022-07-15 ENCOUNTER — TELEPHONE (OUTPATIENT)
Dept: ADMINISTRATIVE | Facility: OTHER | Age: 82
End: 2022-07-15
Payer: MEDICARE

## 2022-12-23 NOTE — PATIENT INSTRUCTIONS
Soft Tissue Contusion  You have a contusion. This is also called a bruise. There is swelling and some bleeding under the skin. This injury generally takes a few days to a few weeks to heal.  During that time, the bruise will typically change in color from reddish, to purple-blue, to greenish-yellow, then to yellow-brown.  Home care  · Elevate the injured area to reduce pain and swelling. As much as possible, sit or lie down with the injured area raised about the level of your heart. This is especially important during the first 48 hours.  · Ice the injured area to help reduce pain and swelling. Wrap a cold source (ice pack or ice cubes in a plastic bag) in a thin towel. Apply to the bruised area for 20 minutes every 1 to 2 hours the first day. Continue this 3 to 4 times a day until the pain and swelling goes away.  · Unless another medication was prescribed, you can take acetaminophen, ibuprofen, or naproxen to control pain. (If you have chronic liver or kidney disease or ever had a stomach ulcer or GI bleeding, talk with your doctor before using these medicines.)  Follow up  Follow up with your health care provider or our staff as advised. Call if you are not better in 1 to 2 weeks.  When to seek medical advice   Call your health care provider right away if you have any of the following:  · Increased pain or swelling  · Bruise is on an arm or leg and arm or leg becomes cold, blue, numb or tingly  · Signs of infection: Warmth, drainage, or increased redness or pain around the contusion  · Inability to move the injured area or body part   · Bruise is near your eye and you have problems with your eyesight or eye   · Frequent bruising for unknown reasons  Date Last Reviewed: 4/29/2015  © 9268-6918 [employer]. [address] [place] [address], [place] [address]. All rights reserved. This information is not intended as a substitute for professional medical care. Always follow your healthcare professional's  instructions.  Ice elevate and inflammatory medication           Try to limit your fluids to no more than 1.5 liters of drinking liquids a day

## 2023-05-18 PROBLEM — N17.9 AKI (ACUTE KIDNEY INJURY): Status: ACTIVE | Noted: 2023-05-18

## 2023-05-18 PROBLEM — N30.00 ACUTE CYSTITIS: Status: ACTIVE | Noted: 2023-05-18

## 2023-05-18 PROBLEM — J40 BRONCHITIS: Status: ACTIVE | Noted: 2023-05-18

## 2023-05-21 PROBLEM — M25.562 LEFT KNEE PAIN: Status: ACTIVE | Noted: 2023-05-21

## 2023-05-22 PROBLEM — M00.9 PYOGENIC ARTHRITIS OF LEFT KNEE JOINT: Status: ACTIVE | Noted: 2023-05-21

## 2023-05-24 PROBLEM — R63.6 UNDERWEIGHT: Status: ACTIVE | Noted: 2023-05-24

## 2023-06-22 ENCOUNTER — DOCUMENT SCAN (OUTPATIENT)
Dept: HOME HEALTH SERVICES | Facility: HOSPITAL | Age: 83
End: 2023-06-22
Payer: MEDICARE

## 2023-06-28 PROBLEM — B35.3 TINEA PEDIS, LEFT: Status: ACTIVE | Noted: 2023-06-28

## 2023-07-03 ENCOUNTER — DOCUMENT SCAN (OUTPATIENT)
Dept: HOME HEALTH SERVICES | Facility: HOSPITAL | Age: 83
End: 2023-07-03
Payer: MEDICARE

## 2023-08-21 PROBLEM — N17.9 AKI (ACUTE KIDNEY INJURY): Status: RESOLVED | Noted: 2023-05-18 | Resolved: 2023-08-21

## 2023-08-30 PROBLEM — E16.2 HYPOGLYCEMIA: Status: ACTIVE | Noted: 2023-08-30

## 2025-03-12 NOTE — ASSESSMENT & PLAN NOTE
77F with Carcinoma of the body of the pancreas now s/p Distal pancreatectomy and splenectomy (4/6/18)    - Continue home meds  - Nebs q8h while awake  - Continue PCA  - Diet: Sips with medications, Ice chips  - Needs to be oob and walking  - Please encourage IS  
77F with Carcinoma of the body of the pancreas now s/p Distal pancreatectomy and splenectomy (4/6/18)    - Continue home meds  - Nebs q8h while awake  - Continue PCA, will add toradol  - Diet: Sips with medications, Ice chips  - Needs to be oob and walking  - Please encourage IS  
77F with Carcinoma of the body of the pancreas now s/p Distal pancreatectomy and splenectomy (4/6/18)    - Continue home meds  - Nebs q8h while awake  - continue PO PRN pain meds  - Diet: full liquid with boost, advance to regular diet  - labs pending this morning, continue to trend  - febrile yesterday with positive Ua, cipro started today, no fevers overnight  - Needs to be oob and walking  - Please encourage IS  - voiding  - will discuss with staff  
77F with Carcinoma of the body of the pancreas now s/p Distal pancreatectomy and splenectomy (4/6/18)    - Continue home meds  - Nebs q8h while awake  - d/c PCA, start PO PRN pain meds  - Diet: full liquid with boost  - labs pending this morning, H/H was stable yesterday on recheck, continue to trend  - Needs to be oob and walking  - Please encourage IS  
77F with Carcinoma of the body of the pancreas now s/p Distal pancreatectomy and splenectomy (4/6/18)    - Continue home meds  - continue PO PRN pain meds  - Diet:regular diet  - labs pending this morning, continue to trend  - afebrile, positive Ua, continues on cipro, no fevers overnight  - Needs to be oob and walking  - Please encourage IS- on room air this morning  - voiding  - discharge home today    
Blanche ARROYO

## (undated) DEVICE — KIT IRR SUCTION HND PIECE

## (undated) DEVICE — NDL 22GA X1 1/2 REG BEVEL

## (undated) DEVICE — SUT PROLENE 5-0 36 V-5 V-5

## (undated) DEVICE — GAUZE SPONGE 4X4 12PLY

## (undated) DEVICE — APPLICATOR CHLORAPREP ORN 26ML

## (undated) DEVICE — RELOAD ECHELON FLEX GRN 60MM

## (undated) DEVICE — SUT ETHILON 2-0 PSLX 30IN

## (undated) DEVICE — SUT 1 48IN PDS II VIO MONO

## (undated) DEVICE — SUT 2-0 SILK 30IN BLK BRAID

## (undated) DEVICE — STAPLER SKIN PROXIMATE WIDE

## (undated) DEVICE — DRAPE ABDOMINAL TIBURON 14X11

## (undated) DEVICE — SUT SILK 3-0 SH 18IN BLACK

## (undated) DEVICE — SEE MEDLINE ITEM 146313

## (undated) DEVICE — ELECTRODE REM PLYHSV RETURN 9

## (undated) DEVICE — GOWN SMART IMP BREATHABLE XXLG

## (undated) DEVICE — DRESSING ADH ISLAND 3.6 X 14

## (undated) DEVICE — SUT 2-0 12-18IN SILK

## (undated) DEVICE — TRAY FOLEY 16FR INFECTION CONT

## (undated) DEVICE — SUT VICRYL BR 1 GEN 27 CT-1

## (undated) DEVICE — TUBE PENROSE DRAIN 18INX5/8IN

## (undated) DEVICE — KIT SAHARA DRAPE DRAW/LIFT

## (undated) DEVICE — Device

## (undated) DEVICE — GOWN SURGICAL X-LARGE

## (undated) DEVICE — CONTAINER SPECIMEN STRL 4OZ

## (undated) DEVICE — SEE MEDLINE ITEM 156902

## (undated) DEVICE — SUT 3-0 12-18IN SILK

## (undated) DEVICE — SUT SILK 2-0 SH 18IN BLACK

## (undated) DEVICE — SYR 30CC LUER LOCK